# Patient Record
Sex: FEMALE | Race: WHITE | NOT HISPANIC OR LATINO | Employment: PART TIME | URBAN - METROPOLITAN AREA
[De-identification: names, ages, dates, MRNs, and addresses within clinical notes are randomized per-mention and may not be internally consistent; named-entity substitution may affect disease eponyms.]

---

## 2017-03-09 ENCOUNTER — OFFICE VISIT (OUTPATIENT)
Dept: FAMILY MEDICINE | Facility: CLINIC | Age: 57
End: 2017-03-09
Payer: COMMERCIAL

## 2017-03-09 VITALS
HEIGHT: 64 IN | WEIGHT: 179 LBS | DIASTOLIC BLOOD PRESSURE: 76 MMHG | SYSTOLIC BLOOD PRESSURE: 134 MMHG | HEART RATE: 89 BPM | BODY MASS INDEX: 30.56 KG/M2 | TEMPERATURE: 99.2 F

## 2017-03-09 DIAGNOSIS — J02.9 ACUTE PHARYNGITIS, UNSPECIFIED ETIOLOGY: ICD-10-CM

## 2017-03-09 DIAGNOSIS — Z12.11 SPECIAL SCREENING FOR MALIGNANT NEOPLASMS, COLON: Primary | ICD-10-CM

## 2017-03-09 DIAGNOSIS — E11.9 TYPE 2 DIABETES MELLITUS WITHOUT COMPLICATION, WITHOUT LONG-TERM CURRENT USE OF INSULIN (H): ICD-10-CM

## 2017-03-09 DIAGNOSIS — L29.9 PRURITIC DISORDER: ICD-10-CM

## 2017-03-09 LAB
ALBUMIN SERPL-MCNC: 3.1 G/DL (ref 3.4–5)
ALP SERPL-CCNC: 1678 U/L (ref 40–150)
ALT SERPL W P-5'-P-CCNC: 138 U/L (ref 0–50)
ANION GAP SERPL CALCULATED.3IONS-SCNC: 8 MMOL/L (ref 3–14)
AST SERPL W P-5'-P-CCNC: 115 U/L (ref 0–45)
BASOPHILS # BLD AUTO: 0 10E9/L (ref 0–0.2)
BASOPHILS NFR BLD AUTO: 0.7 %
BILIRUB SERPL-MCNC: 0.8 MG/DL (ref 0.2–1.3)
BUN SERPL-MCNC: 21 MG/DL (ref 7–30)
CALCIUM SERPL-MCNC: 8.9 MG/DL (ref 8.5–10.1)
CHLORIDE SERPL-SCNC: 100 MMOL/L (ref 94–109)
CHOLEST SERPL-MCNC: 204 MG/DL
CO2 SERPL-SCNC: 28 MMOL/L (ref 20–32)
CREAT SERPL-MCNC: 0.8 MG/DL (ref 0.52–1.04)
CREAT UR-MCNC: 247 MG/DL
DIFFERENTIAL METHOD BLD: NORMAL
EOSINOPHIL # BLD AUTO: 0.2 10E9/L (ref 0–0.7)
EOSINOPHIL NFR BLD AUTO: 2.5 %
ERYTHROCYTE [DISTWIDTH] IN BLOOD BY AUTOMATED COUNT: 13.2 % (ref 10–15)
GFR SERPL CREATININE-BSD FRML MDRD: 73 ML/MIN/1.7M2
GLUCOSE SERPL-MCNC: 161 MG/DL (ref 70–99)
HBA1C MFR BLD: 8.2 % (ref 4.3–6)
HCT VFR BLD AUTO: 37.8 % (ref 35–47)
HDLC SERPL-MCNC: 72 MG/DL
HGB BLD-MCNC: 12.1 G/DL (ref 11.7–15.7)
LDLC SERPL CALC-MCNC: 121 MG/DL
LYMPHOCYTES # BLD AUTO: 1.1 10E9/L (ref 0.8–5.3)
LYMPHOCYTES NFR BLD AUTO: 18.2 %
MCH RBC QN AUTO: 29.4 PG (ref 26.5–33)
MCHC RBC AUTO-ENTMCNC: 32 G/DL (ref 31.5–36.5)
MCV RBC AUTO: 92 FL (ref 78–100)
MICROALBUMIN UR-MCNC: 20 MG/L
MICROALBUMIN/CREAT UR: 8.02 MG/G CR (ref 0–25)
MONOCYTES # BLD AUTO: 0.6 10E9/L (ref 0–1.3)
MONOCYTES NFR BLD AUTO: 9.7 %
NEUTROPHILS # BLD AUTO: 4.2 10E9/L (ref 1.6–8.3)
NEUTROPHILS NFR BLD AUTO: 68.9 %
NONHDLC SERPL-MCNC: 132 MG/DL
PLATELET # BLD AUTO: 271 10E9/L (ref 150–450)
POTASSIUM SERPL-SCNC: 3.8 MMOL/L (ref 3.4–5.3)
PROT SERPL-MCNC: 8.6 G/DL (ref 6.8–8.8)
RBC # BLD AUTO: 4.11 10E12/L (ref 3.8–5.2)
SODIUM SERPL-SCNC: 136 MMOL/L (ref 133–144)
T4 FREE SERPL-MCNC: 1.07 NG/DL (ref 0.76–1.46)
TRIGL SERPL-MCNC: 56 MG/DL
TSH SERPL DL<=0.05 MIU/L-ACNC: 1.85 MU/L (ref 0.4–4)
WBC # BLD AUTO: 6.1 10E9/L (ref 4–11)

## 2017-03-09 PROCEDURE — 85025 COMPLETE CBC W/AUTO DIFF WBC: CPT | Performed by: FAMILY MEDICINE

## 2017-03-09 PROCEDURE — 36415 COLL VENOUS BLD VENIPUNCTURE: CPT | Performed by: FAMILY MEDICINE

## 2017-03-09 PROCEDURE — 83036 HEMOGLOBIN GLYCOSYLATED A1C: CPT | Performed by: FAMILY MEDICINE

## 2017-03-09 PROCEDURE — 99214 OFFICE O/P EST MOD 30 MIN: CPT | Performed by: FAMILY MEDICINE

## 2017-03-09 PROCEDURE — 84439 ASSAY OF FREE THYROXINE: CPT | Performed by: FAMILY MEDICINE

## 2017-03-09 PROCEDURE — 80053 COMPREHEN METABOLIC PANEL: CPT | Performed by: FAMILY MEDICINE

## 2017-03-09 PROCEDURE — 82043 UR ALBUMIN QUANTITATIVE: CPT | Performed by: FAMILY MEDICINE

## 2017-03-09 PROCEDURE — 80061 LIPID PANEL: CPT | Performed by: FAMILY MEDICINE

## 2017-03-09 PROCEDURE — 84443 ASSAY THYROID STIM HORMONE: CPT | Performed by: FAMILY MEDICINE

## 2017-03-09 RX ORDER — AZITHROMYCIN 250 MG/1
250 TABLET, FILM COATED ORAL DAILY
Qty: 6 TABLET | Refills: 1 | Status: SHIPPED | OUTPATIENT
Start: 2017-03-09 | End: 2017-03-30

## 2017-03-09 RX ORDER — LANCETS
1 EACH MISCELLANEOUS DAILY
Qty: 102 BOX | Refills: 12 | Status: SHIPPED | OUTPATIENT
Start: 2017-03-09 | End: 2018-10-16

## 2017-03-09 NOTE — PROGRESS NOTES
a  SUBJECTIVE:                                                    Saniya Couch is 57 year old female   Chief Complaint   Patient presents with     Cough     dry cough for 3 days     Derm Problem     pruritis throughout her entire body - couple weeks     Concern - dry cough     Onset: couple days     Description:   Dry, hacking cough the past 3 days. Denies any other URI symptoms.    Intensity: mild    Progression of Symptoms:  same    Accompanying Signs & Symptoms:  Headache due to coughing       Previous history of similar problem:   n/a    Precipitating factors:   Worsened by: n/a    Alleviating factors:  Improved by: Mucinex DM       Therapies Tried and outcome: Mucinex DM    Concern - Pruritis     Onset: couple weeks    Description:   Entire body itching started about 2-3 weeks ago. Denies any visual rash or bumps.    Intensity: moderate    Progression of Symptoms:  same    Accompanying Signs & Symptoms:  N/a       Previous history of similar problem:   N/a    Precipitating factors:   Worsened by: N/a    Alleviating factors:  Improved by: OTC anti-itch relief       Therapies Tried and outcome:OTC topical anit-itch relief.      Problem list and histories reviewed & adjusted, as indicated.  Additional history: as documented    Patient Active Problem List   Diagnosis     GERD (gastroesophageal reflux disease)     Hyperlipidemia LDL goal <130     Family history of diabetes mellitus     Essential hypertension, benign     Non morbid obesity due to excess calories     Type 2 diabetes mellitus without complication (H)     Past Surgical History   Procedure Laterality Date     Gallbladder surgery        section          Social History   Substance Use Topics     Smoking status: Never Smoker     Smokeless tobacco: Never Used     Alcohol use No     Family History   Problem Relation Age of Onset     Hypertension Mother      Dementia Mother      HEART DISEASE Father      heart disease     Thyroid Disease  Daughter      thyroid disease     DIABETES Brother      DIABETES Brother      DIABETES Brother      DIABETES Brother          Current Outpatient Prescriptions   Medication Sig Dispense Refill     Emollient (CERAVE) CREA Externally apply topically 2 times daily 1 Bottle 1     azithromycin (ZITHROMAX Z-MELITA) 250 MG tablet Take 1 tablet (250 mg) by mouth daily Two tablets first day, then one tablet daily for four days 6 tablet 1     metFORMIN (GLUCOPHAGE) 500 MG tablet Take 1 tablet (500 mg) by mouth daily (with dinner) 90 tablet 3     blood glucose monitoring (ACCU-CHEK FASTCLIX) lancets 1 each daily Use to test blood sugar 1 times daily or as directed. 102 Box 12     blood glucose monitoring (ACCU-CHEK SMARTVIEW) test strip Use to test blood sugar 2 times daily or as directed. 100 each 12     lisinopril (PRINIVIL,ZESTRIL) 5 MG tablet Take 1 tablet (5 mg) by mouth daily 90 tablet 3     atorvastatin (LIPITOR) 40 MG tablet Take 1 tablet (40 mg) by mouth daily 90 tablet 3     Multiple Vitamins-Minerals (WOMENS 50+ MULTI VITAMIN/MIN) TABS Take  by mouth.       [DISCONTINUED] metFORMIN (GLUCOPHAGE) 500 MG tablet Take 1 tablet (500 mg) by mouth daily (with dinner) 30 tablet 11     No Known Allergies  Recent Labs   Lab Test  07/07/16   1554  07/07/16   0839  02/18/16   0754  01/16/15   0835   11/09/10   0920   A1C   --   6.5*  7.4*  6.3*   < >   --    LDL   --   93  95  96   < >  123   HDL   --   50  42*  46*   < >  46*   TRIG   --   111  282*  166*   < >  166*   ALT   --    --    --    --    --   21   CR  0.67   --   0.71   --    < >   --    GFRESTIMATED  >90  Non  GFR Calc     --   85   --    < >   --    GFRESTBLACK  >90   GFR Calc     --   >90   GFR Calc     --    < >   --    POTASSIUM  4.0   --   4.3   --    < >   --    TSH   --   3.12   --    --    --    --     < > = values in this interval not displayed.      BP Readings from Last 3 Encounters:   03/09/17 134/76   10/10/16  "139/79   07/07/16 138/82    Wt Readings from Last 3 Encounters:   03/09/17 179 lb (81.2 kg)   10/10/16 190 lb (86.2 kg)   07/12/16 186 lb (84.4 kg)         ROS:  Constitutional, HEENT, cardiovascular, pulmonary, gi and gu systems are negative, except as otherwise noted.    OBJECTIVE:                                                    /76  Pulse 89  Temp 99.2  F (37.3  C) (Tympanic)  Ht 5' 4\" (1.626 m)  Wt 179 lb (81.2 kg)  BMI 30.73 kg/m2  GENERAL APPEARANCE ADULT: Alert, no acute distress, appears ill  HENT:  cobblestoning and thick mucous posterior pharynx., right TM abnormal, dull, left TM abnormal, dull, throat/mouth:moderate erythema, mucous membranes moist  RESP: lungs clear to auscultation   CV: normal rate, regular rhythm, no murmur or gallop  Diagnostic Test Results:  pending     ASSESSMENT/PLAN:                                                    1. Special screening for malignant neoplasms, colon  - Fecal colorectal cancer screen (FIT); Future    2. Pruritic disorder  Xeroderma, allergic, getting better  - Comprehensive metabolic panel  - TSH  - T4 FREE  - CBC with platelets differential  - Emollient (CERAVE) CREA; Externally apply topically 2 times daily  Dispense: 1 Bottle; Refill: 1    3. Acute pharyngitis, unspecified etiology  Viral vs bacterial.  Trial of antibiotics but if not effective viral infection and self limiting.  If effective and recurs will repeat, see patient instructions.  - azithromycin (ZITHROMAX Z-MELITA) 250 MG tablet; Take 1 tablet (250 mg) by mouth daily Two tablets first day, then one tablet daily for four days  Dispense: 6 tablet; Refill: 1    4. Type 2 diabetes mellitus without complication, without long-term current use of insulin (H)  due for labs and refill, taking medication without difficulty  - metFORMIN (GLUCOPHAGE) 500 MG tablet; Take 1 tablet (500 mg) by mouth daily (with dinner)  Dispense: 90 tablet; Refill: 3  - blood glucose monitoring (ACCU-CHEK FASTCLIX) " lancets; 1 each daily Use to test blood sugar 1 times daily or as directed.  Dispense: 102 Box; Refill: 12  - blood glucose monitoring (ACCU-CHEK SMARTVIEW) test strip; Use to test blood sugar 2 times daily or as directed.  Dispense: 100 each; Refill: 12  - Hemoglobin A1c  - Lipid panel reflex to direct LDL  - Albumin Random Urine Quantitative    Marbella Landaverde MD  [facility]

## 2017-03-09 NOTE — MR AVS SNAPSHOT
After Visit Summary   3/9/2017    Saniya Couch    MRN: [id number]           Patient Information     Date Of Birth          1960        Visit Information        Provider Department      3/9/2017 7:20 AM Marbella Landaverde MD [facility]        Today's Diagnoses     Special screening for malignant neoplasms, colon    -  1    Pruritic disorder        Acute pharyngitis, unspecified etiology        Type 2 diabetes mellitus without complication, without long-term current use of insulin (H)          Care Instructions          Thank you for choosing [facility].  You may be receiving a survey in the mail [facility] your [facility] today.  Please take a few minutes to complete and return the survey to let us know how we are doing.      If you have questions or concerns, please contact us via EndPlay or you can contact your care team at [phone].    Our Clinic hours are:  Monday 6:40 am  to 7:00 pm  Tuesday -Friday 6:40 am to 5:00 pm    The [place] outpatient lab hours are:  Monday - Friday 6:10 am to 4:45 pm  Saturdays 7:00 am to 11:00 am  Appointments are required, call [phone]    If you have clinical questions after hours or would like to schedule an appointment,  call the clinic at [phone].          Follow-ups after your visit        Future tests that were ordered for you today     Open Future Orders        Priority Expected Expires Ordered    Fecal colorectal cancer screen (FIT) Routine 3/30/2017 6/1/2017 3/9/2017            Who to contact     If you have questions or need follow up information about today's clinic visit or your schedule please contact [facility] directly at [phone].  Normal or non-critical lab and imaging results will be communicated to you by MyChart, letter or phone within 4 business days after the clinic has received the results. If you do not hear from us within 7 days, please contact the clinic through EndPlay or  "phone. If you have a critical or abnormal lab result, we will notify you by phone as soon as possible.  Submit refill requests through Noblivity or call your pharmacy and they will forward the refill request to us. Please allow 3 business days for your refill to be completed.          Additional Information About Your Visit        coRankhart Information     Noblivity lets you send messages to your doctor, view your test results, renew your prescriptions, schedule appointments and more. To sign up, go to [email or web address] . Click on \"Log in\" on the left side of the screen, which will take you to the Welcome page. Then click on \"Sign up Now\" on the right side of the page.     You will be asked to enter the access code listed below, as well as some personal information. Please follow the directions to create your username and password.     Your access code is: [email or web address]  Expires: 2017  7:50 AM     Your access code will  in 90 days. If you need help or a new code, please call your [place] clinic or [phone].        Care EveryWhere ID     This is your Care EveryWhere ID. This could be used by other organizations to access your [place] medical records  [id number]        Your Vitals Were     Pulse Temperature Height BMI (Body Mass Index)          89 99.2  F (37.3  C) (Tympanic) 5' 4\" (1.626 m) 30.73 kg/m2         Blood Pressure from Last 3 Encounters:   17 134/76   10/10/16 139/79   16 138/82    Weight from Last 3 Encounters:   17 179 lb (81.2 kg)   10/10/16 190 lb (86.2 kg)   16 186 lb (84.4 kg)              We Performed the Following     Albumin Random Urine Quantitative     CBC with platelets differential     Comprehensive metabolic panel     Hemoglobin A1c     Lipid panel reflex to direct LDL     T4 FREE     TSH          Today's Medication Changes          These changes are accurate as of: 3/9/17  7:50 AM.  If you have any questions, ask your nurse or doctor.             "   Start taking these medicines.        Dose/Directions    azithromycin 250 MG tablet   Commonly known as:  ZITHROMAX Z-MELITA   Used for:  Acute pharyngitis, unspecified etiology   Started by:  Marbella Landaverde MD        Dose:  250 mg   Take 1 tablet (250 mg) by mouth daily Two tablets first day, then one tablet daily for four days   Quantity:  6 tablet   Refills:  1       CERAVE Crea   Used for:  Pruritic disorder   Started by:  Marbella Landaverde MD        Externally apply topically 2 times daily   Quantity:  1 Bottle   Refills:  1         Stop taking these medicines if you haven't already. Please contact your care team if you have questions.     simvastatin 40 MG tablet   Commonly known as:  ZOCOR   Stopped by:  Marbella Landaverde MD                Where to get your medicines      These medications [facility] to [facility] [address] [place] [address] AT [address]  [address] [facility] [address]     Phone:  [phone]     azithromycin 250 MG tablet    blood glucose monitoring lancets    blood glucose monitoring test strip    CERAVE Crea    metFORMIN 500 MG tablet                Primary Care Provider Office Phone # Fax #    Marbella Landaverde [phone]       [facility] [address] [facility] [address]        Thank you!     Thank you for choosing [facility]  for your care. Our goal is always to provide you with excellent care. Hearing back from our patients is one way we can continue to improve our services. Please take a few minutes to complete the written survey that you may receive in the mail after your visit with us. Thank you!             Your Updated Medication List - Protect others around you: Learn how to safely use, store and throw away your medicines at www.disposemymeds.org.          This list is accurate as of: 3/9/17  7:50 AM.  Always use your most recent med list.                   Brand  Name Dispense Instructions for use    atorvastatin 40 MG tablet    LIPITOR    90 tablet    Take 1 tablet (40 mg) by mouth daily       azithromycin 250 MG tablet    ZITHROMAX Z-MELITA    6 tablet    Take 1 tablet (250 mg) by mouth daily Two tablets first day, then one tablet daily for four days       blood glucose monitoring lancets     102 Box    1 each daily Use to test blood sugar 1 times daily or as directed.       blood glucose monitoring test strip    ACCU-CHEK SMARTVIEW    100 each    Use to test blood sugar 2 times daily or as directed.       CERAVE Crea     1 Bottle    Externally apply topically 2 times daily       lisinopril 5 MG tablet    PRINIVIL/ZESTRIL    90 tablet    Take 1 tablet (5 mg) by mouth daily       metFORMIN 500 MG tablet    GLUCOPHAGE    90 tablet    Take 1 tablet (500 mg) by mouth daily (with dinner)       WOMENS 50+ MULTI VITAMIN/MIN Tabs      Take  by mouth.

## 2017-03-09 NOTE — LETTER
[facility]  [address] [facility] [place] [address]  Phone: [phone]    March 13, 2017    Saniya Couch  [address]  [place] [address]          Dear Ms. Couch,    The results of your recent lab tests were:  Liver function tests are high, rest of tests essentially normal.  A1C bit over goal, Plan to see provider for review and treatment. To schedule that appointment please call [phone].  Component      Latest Ref Rng & Units 3/9/2017   WBC      4.0 - 11.0 10e9/L 6.1   RBC Count      3.8 - 5.2 10e12/L 4.11   Hemoglobin      11.7 - 15.7 g/dL 12.1   Hematocrit      35.0 - 47.0 % 37.8   MCV      78 - 100 fl 92   MCH      26.5 - 33.0 pg 29.4   MCHC      31.5 - 36.5 g/dL 32.0   RDW      10.0 - 15.0 % 13.2   Platelet Count      150 - 450 10e9/L 271   Diff Method       Automated Method   % Neutrophils      % 68.9   % Lymphocytes      % 18.2   % Monocytes      % 9.7   % Eosinophils      % 2.5   % Basophils      % 0.7   Absolute Neutrophil      1.6 - 8.3 10e9/L 4.2   Absolute Lymphocytes      0.8 - 5.3 10e9/L 1.1   Absolute Monocytes      0.0 - 1.3 10e9/L 0.6   Absolute Eosinophils      0.0 - 0.7 10e9/L 0.2   Absolute Basophils      0.0 - 0.2 10e9/L 0.0   Sodium      133 - 144 mmol/L 136   Potassium      3.4 - 5.3 mmol/L 3.8   Chloride      94 - 109 mmol/L 100   Carbon Dioxide      20 - 32 mmol/L 28   Anion Gap      3 - 14 mmol/L 8   Glucose      70 - 99 mg/dL 161 (H)   Urea Nitrogen      7 - 30 mg/dL 21   Creatinine      0.52 - 1.04 mg/dL 0.80   GFR Estimate      >60 mL/min/1.7m2 73   GFR Estimate If Black      >60 mL/min/1.7m2 89   Calcium      8.5 - 10.1 mg/dL 8.9   Bilirubin Total      0.2 - 1.3 mg/dL 0.8   Albumin      3.4 - 5.0 g/dL 3.1 (L)   Protein Total      6.8 - 8.8 g/dL 8.6   Alkaline Phosphatase      40 - 150 U/L 1678 (H)   ALT      0 - 50 U/L 138 (H)   AST      0 - 45 U/L 115 (H)   Cholesterol      <200 mg/dL 204 (H)   Triglycerides      <150 mg/dL 56   HDL  Cholesterol      >49 mg/dL 72   LDL Cholesterol Calculated      <100 mg/dL 121 (H)   Non HDL Cholesterol      <130 mg/dL 132 (H)   Creatinine Urine      mg/dL 247   Albumin Urine mg/L      mg/L 20   Albumin Urine mg/g Cr      0 - 25 mg/g Cr 8.02   TSH      0.40 - 4.00 mU/L 1.85   T4 Free      0.76 - 1.46 ng/dL 1.07   Hemoglobin A1C      4.3 - 6.0 % 8.2 (H)   If you have any further questions or problems, please contact our office.    Sincerely,      Marbella Landaverde MD / mona

## 2017-03-09 NOTE — NURSING NOTE
"Chief Complaint   Patient presents with     Cough     dry cough for 3 days     Derm Problem     pruritis throughout her entire body - couple weeks       Initial /76  Pulse 89  Temp 99.2  F (37.3  C) (Tympanic)  Ht 5' 4\" (1.626 m)  Wt 179 lb (81.2 kg)  BMI 30.73 kg/m2 Estimated body mass index is 30.73 kg/(m^2) as calculated from the following:    Height as of this encounter: 5' 4\" (1.626 m).    Weight as of this encounter: 179 lb (81.2 kg).  Medication Reconciliation: complete  "

## 2017-03-09 NOTE — PATIENT INSTRUCTIONS
Thank you for choosing [facility].  You may be receiving a survey in the mail from [employer] regarding your visit today.  Please take a few minutes to complete and return the survey to let us know how we are doing.      If you have questions or concerns, please contact us via Soko or you can contact your care team at [phone].    Our Clinic hours are:  Monday 6:40 am  to 7:00 pm  Tuesday -Friday 6:40 am to 5:00 pm    The [place] outpatient lab hours are:  Monday - Friday 6:10 am to 4:45 pm  Saturdays 7:00 am to 11:00 am  Appointments are required, call [phone]    If you have clinical questions after hours or would like to schedule an appointment,  call the clinic at [phone].

## 2017-03-10 NOTE — PROGRESS NOTES
Liver function tests are high, rest of tests essentially normal.  A1C bit over goal, Plan see provider for review and treatment.  Please notify.        Thank you. PHILIPPE TRAN MD

## 2017-03-19 PROCEDURE — 82274 ASSAY TEST FOR BLOOD FECAL: CPT | Performed by: FAMILY MEDICINE

## 2017-03-21 DIAGNOSIS — Z12.11 SPECIAL SCREENING FOR MALIGNANT NEOPLASMS, COLON: ICD-10-CM

## 2017-03-21 LAB — HEMOCCULT STL QL IA: NEGATIVE

## 2017-03-21 NOTE — LETTER
[facility]  [address] [facility] [address]  Phone: [phone]  Fax: [phone]    March 22, 2017    Saniya Couch  [address]  [facility] [address]          Dear Ms. Couch,    The results of your recent lab tests were FIT test neg for blood. Repeat yearly or get colonoscopy every 10 years to screen for colon cancer.  Component      Latest Ref Rng & Units 3/19/2017   Occult Blood Scn FIT      NEG Negative    . If you have any further questions or problems, please contact our office.    Sincerely,      Marbella Landaverde MD / cb

## 2017-03-22 NOTE — PROGRESS NOTES
FIT test neg for blood. Repeat yearly or get colonoscopy every 10 years to screen for colon cancer. If further questions or problems notify me. PHILIPPE TRAN MD

## 2017-03-30 ENCOUNTER — APPOINTMENT (OUTPATIENT)
Dept: GENERAL RADIOLOGY | Facility: CLINIC | Age: 57
End: 2017-03-30
Attending: EMERGENCY MEDICINE
Payer: COMMERCIAL

## 2017-03-30 ENCOUNTER — HOSPITAL ENCOUNTER (EMERGENCY)
Facility: CLINIC | Age: 57
Discharge: HOME | End: 2017-03-30
Attending: EMERGENCY MEDICINE | Admitting: EMERGENCY MEDICINE
Payer: COMMERCIAL

## 2017-03-30 VITALS
RESPIRATION RATE: 18 BRPM | OXYGEN SATURATION: 99 % | HEART RATE: 89 BPM | DIASTOLIC BLOOD PRESSURE: 79 MMHG | TEMPERATURE: 98.1 F | SYSTOLIC BLOOD PRESSURE: 135 MMHG

## 2017-03-30 DIAGNOSIS — S86.911A STRAIN OF RIGHT KNEE, INITIAL ENCOUNTER: ICD-10-CM

## 2017-03-30 PROCEDURE — 99213 OFFICE O/P EST LOW 20 MIN: CPT

## 2017-03-30 PROCEDURE — 73562 X-RAY EXAM OF KNEE 3: CPT | Mod: RT

## 2017-03-30 PROCEDURE — 99213 OFFICE O/P EST LOW 20 MIN: CPT | Performed by: EMERGENCY MEDICINE

## 2017-03-30 NOTE — ED AVS SNAPSHOT
[facility]    [address] [facility] [address]    Phone:  [phone]    Fax:  [phone]                                       Saniya Couch   MRN: [id number]    Department:  [facility]   Date of Visit:  3/30/2017           After Visit Summary Signature Page     I have received my discharge instructions, and my questions have been answered. I have discussed any challenges I see with this plan with the nurse or doctor.    ..........................................................................................................................................  Patient/Patient Representative Signature      ..........................................................................................................................................  Patient Representative Print Name and Relationship to Patient    ..................................................               ................................................  Date                                            Time    ..........................................................................................................................................  Reviewed by Signature/Title    ...................................................              ..............................................  Date                                                            Time

## 2017-03-30 NOTE — ED AVS SNAPSHOT
[facility] Emergency Department    [address] [facility] [address]    Phone:  [phone]    Fax:  [phone]                                       Saniya Couch   MRN: [id number]    Department:  [facility] Emergency Department   Date of Visit:  3/30/2017           Patient Information     Date Of Birth          1960        Your diagnoses for this visit were:     Strain of right knee, initial encounter        You were seen by Tato Valentin MD.      Follow-up Information     Follow up with Marbella Landaverde MD.    Specialty:  Family Practice    Why:  As needed    Contact information:    [facility]  [address] [place] [address]  [phone]          Follow up with [facility].    Specialty:  EMERGENCY MEDICINE    Why:  If symptoms worsen    Contact information:    [address] [place] [id number] [phone]    Additional information:    The medical center is located at   [address] [place] [address] (between [address] and   [facility] [address] [place] [address], four miles north   of [place]).        Follow up with [facility].    Specialty:  Orthopedics and Sports Medicine    Why:  As needed    Contact information:    [address] [place] [address]  [phone]    Additional information:    The medical center is located at   [address] [place] [address] (between [address] and   [address] [facility], four miles north   of [place]).        Discharge Instructions        return if symptoms worsen or new symptoms develop.  Follow-up with primary care physician next available.  Take ibuprofen or Tylenol for pain.  Use knee immobilizer as needed for stabilization of the knee.  If symptoms do not improve over the next week follow up with orthopedics.  Ice your knee and elevate this evening.  Muscle Strain in the Extremities  A muscle strain is a stretching and tearing of muscle fibers. This causes  pain, especially when you move that muscle. There may also be some swelling and bruising.  Home care    Keep the hurt area raised to reduce pain and swelling. This is especially important during the first 48 hours.    Apply an ice pack over the injured area for 15 to 20 minutes every 3 to 6 hours. You should do this for the first 24 to 48 hours. You can make an ice pack by filling a plastic bag that seals at the top with ice cubes and then wrapping it with a thin towel. Be careful not to injure your skin with the ice treatments. Ice should never be applied directly to skin. Continue the use of ice packs for relief of pain and swelling as needed. After 48 hours, apply heat (warm shower or warm bath) for 15 to 20 minutes several times a day, or alternate ice and heat.    You may use over-the-counter pain medicine to control pain, unless another medicine was prescribed. If you have chronic liver or kidney disease or ever had a stomach ulcer or GI bleeding, talk with your healthcare provider before using these medicines.    For leg strains: If crutches have been recommended, don t put full weight on the hurt leg until you can do so without pain. You can return to sports when you are able to hop and run on the injured leg without pain.  Follow-up care  Follow up with your healthcare provider, or as advised.  When to seek medical advice  Call your healthcare provider right away if any of these occur:    The toes of the injured leg become swollen, cold, blue, numb, or tingly    Pain or swelling increases    9752-1407 [employer]. [address] [place] [address], [place] [address]. All rights reserved. This information is not intended as a substitute for professional medical care. Always follow your healthcare professional's instructions.          Treating Strains and Sprains  Strains and sprains happen when muscles or other soft tissues near your bones stretch or tear. These injuries can cause bruising, swelling, and  pain. To ease your discomfort and speed the healing of your strain or sprain, follow the tips below. Remember, a strain or sprain can take 6 to 8 weeks to heal.     Important Note: Do not give aspirin to children or teens without discussing it with your healthcare provider first.        Ice first, heat later    Use ice for the first 24 to 48 hours after injury. Ice helps prevent swelling and reduce pain. Ice the injury for no more than 20 minutes at a time and allow at least  20 minutes between icing sessions.    Apply heat after the first 72 hours, once the swelling has gone down. Heat relaxes muscles and increases blood flow. Soak the injured area in warm water or use a heating pad set on low for no more than 15 minutes at a time.  Wrap and elevate    Wrap an injured limb firmly with an elastic bandage. This provides support and helps prevent swelling. Don t wear an elastic bandage overnight. Watch for tingling, numbness, or increased pain, and remove the bandage immediately if any of these occurs.    Elevate the injured area to help reduce swelling and throbbing. It s best to raise an injured limb above the level of your heart.     Medicines    Over-the-counter medicines such as acetaminophen or ibuprofen can help reduce pain. Some also help reduce swelling.    Take medicine only as directed.    Rest the area even if medicines are controlling the pain.  Rest    Rest the injured area by not using it for 24 hours.    When you re ready, return slowly to your normal activities. Rest the injured area often.    Don t use or walk on an injured limb if it hurts.    4735-9703 [employer]. [address] [place] [address], [place] [address]. All rights reserved. This information is not intended as a substitute for professional medical care. Always follow your healthcare professional's instructions.          24 Hour Appointment Hotline       To make an appointment at any [facility], call [phone]  ([phone]). If you don't have a family doctor or clinic, we will help you find one. [facility] are conveniently located to serve the needs of you and your family.          ED Discharge Orders     Knee Immobilizer                    Review of your medicines      Our records show that you are taking the medicines listed below. If these are incorrect, please call your family doctor or clinic.        Dose / Directions Last dose taken    atorvastatin 40 MG tablet   Commonly known as:  LIPITOR   Dose:  40 mg   Quantity:  90 tablet        Take 1 tablet (40 mg) by mouth daily   Refills:  3        blood glucose monitoring lancets   Dose:  1 each   Quantity:  102 Box        1 each daily Use to test blood sugar 1 times daily or as directed.   Refills:  12        blood glucose monitoring test strip   Commonly known as:  ACCU-CHEK SMARTVIEW   Quantity:  100 each        Use to test blood sugar 2 times daily or as directed.   Refills:  12        CERAVE Crea   Quantity:  1 Bottle        Externally apply topically 2 times daily   Refills:  1        lisinopril 5 MG tablet   Commonly known as:  PRINIVIL/ZESTRIL   Dose:  5 mg   Quantity:  90 tablet        Take 1 tablet (5 mg) by mouth daily   Refills:  3        metFORMIN 500 MG tablet   Commonly known as:  GLUCOPHAGE   Dose:  500 mg   Quantity:  90 tablet        Take 1 tablet (500 mg) by mouth daily (with dinner)   Refills:  3        WOMENS 50+ MULTI VITAMIN/MIN Tabs        Take  by mouth.   Refills:  0                Procedures and tests performed during your visit     Knee XR, 3 views, right      Orders Needing Specimen Collection     None      Pending Results     Date and Time Order Name Status Description    3/30/2017 1712 Knee XR, 3 views, right Preliminary             Pending Culture Results     No orders found from 3/28/2017 to 3/31/2017.             Test Results from your hospital stay     3/30/2017  5:30 PM - Interface, Radiant Ib      Narrative     RIGHT KNEE  "THREE VIEWS   3/30/2017 5:26 PM     HISTORY: Pain.    COMPARISON: None.    FINDINGS: Negative right knee. No fracture. No significant  degenerative change.        Impression     IMPRESSION: Negative.                Thank you for choosing [facility]       Thank you for choosing [facility] for your care. Our goal is always to provide you with excellent care. Hearing back from our patients is one way we can continue to improve our services. Please take a few minutes to complete the written survey that you may receive in the mail after you visit with us. Thank you!        [employer] Information     [employer] lets you send messages to your doctor, view your test results, renew your prescriptions, schedule appointments and more. To sign up, go to [email or web address] . Click on \"Log in\" on the left side of the screen, which will take you to the Welcome page. Then click on \"Sign up Now\" on the right side of the page.     You will be asked to enter the access code listed below, as well as some personal information. Please follow the directions to create your username and password.     Your access code is: [email or web address]  Expires: 2017  8:50 AM     Your access code will  in 90 days. If you need help or a new code, please call your [facility] clinic or [phone].        Care EveryWhere ID     This is your Care EveryWhere ID. This could be used by other organizations to access your [facility] medical records  [id number]        After Visit Summary       This is your record. Keep this with you and show to your community pharmacist(s) and doctor(s) at your next visit.                  "

## 2017-03-30 NOTE — ED PROVIDER NOTES
History     Chief Complaint   Patient presents with     Knee Pain     twisted knee wrong     HPI  Saniya Couch is a 57 year old female who presents to the emergency department complaining of right knee pain status post trauma.  Patient states she injured her right knee several weeks ago by twisting it.  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  Patient states she has had some swelling and worsening pain today.  She states the pain is mostly in the lateral aspects of the knee both medial and laterally she states it feels like something is been strained.  She did not fall she did not hit her head.  She denies any neck pain back pain chest pain abdominal pain or focal numbness weakness in the extremity.  She currently rates her pain a 3 out of 10 worse and with any movement.  Past Medical History:   Diagnosis Date     Acid reflux      ASCUS favor benign 9/2007    negative HPV     Helicobacter pylori      Hyperglycemia      Hyperlipidemia      Obesity, unspecified      Ulcer (H) 2005    h-pylori       No current facility-administered medications on file prior to encounter.   Current Outpatient Prescriptions on File Prior to Encounter:  Emollient (CERAVE) CREA Externally apply topically 2 times daily   metFORMIN (GLUCOPHAGE) 500 MG tablet Take 1 tablet (500 mg) by mouth daily (with dinner)   blood glucose monitoring (ACCU-CHEK FASTCLIX) lancets 1 each daily Use to test blood sugar 1 times daily or as directed.   blood glucose monitoring (ACCU-CHEK SMARTVIEW) test strip Use to test blood sugar 2 times daily or as directed.   lisinopril (PRINIVIL,ZESTRIL) 5 MG tablet Take 1 tablet (5 mg) by mouth daily   atorvastatin (LIPITOR) 40 MG tablet Take 1 tablet (40 mg) by mouth daily   Multiple Vitamins-Minerals (WOMENS 50+ MULTI VITAMIN/MIN) TABS Take  by mouth.     Social History     Social History     Marital status:       Spouse name: N/A     Number of children: N/A     Years of education: N/A     Occupational History     Not on file.     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    Review of Systems  As per HPI  Physical Exam   BP: 135/79  Pulse: 89  Temp: 98.1  F (36.7  C)  Resp: 18  SpO2: 99 %  Physical Exam   Constitutional: She appears well-developed and well-nourished. No distress.   HENT:   Head: Normocephalic.   Pulmonary/Chest: Effort normal.   Musculoskeletal:   Right knee with mild swelling in the superior and inferior aspects of the knee.  Tender to palpation along the joint line.  No instability.  Patient has pain with lateral and medial pressure no drawer sign is noted.  No calf tenderness.  Pulses sensation symmetrical she is able to flex and extend the knee.   Neurological: She is alert. She exhibits normal muscle tone.   Skin: Skin is warm and dry. No rash noted.   Psychiatric: She has a normal mood and affect.   Nursing note and vitals reviewed.      ED Course     ED Course     Procedures             Critical Care time:  none               Results for orders placed or performed during the hospital encounter of 03/30/17   Knee XR, 3 views, right    Narrative    RIGHT KNEE THREE VIEWS   3/30/2017 5:26 PM      HISTORY: Pain.    COMPARISON: None.    FINDINGS: Negative right knee. No fracture. No significant  degenerative change.      Impression    IMPRESSION: Negative.    ANDRESSA DEWEY MD         Assessments & Plan (with Medical Decision Making) x-ray was obtained and was unremarkable.  Findings were discussed with patient.  Plan  toPlace her in a knee immobilizer and follow-up orthopedics.  She should elevate his knee this evening and take ibuprofen as needed.  She does not have any calf pain and I do not think she has a DVT.  She'll be given a work excuse for tomorrow.  She should weight-bear as tolerated and if pain worsens stay off the knee as much as possible.  If increased pain swelling or other symptoms she should return for further evaluation and care.       I have reviewed the nursing notes.    I have reviewed the findings, diagnosis, plan and need for follow up with the patient.    Discharge Medication List as of 3/30/2017  5:43 PM          Final diagnoses:   Strain of right knee, initial encounter       3/30/2017   [facility] EMERGENCY DEPARTMENT     Tato Valentin MD  03/30/17 1934

## 2017-03-30 NOTE — LETTER
[facility] EMERGENCY DEPARTMENT  [address] [facility] [address]  [phone]    2017    Saniya Couch  [address]  [facility] [place] [address]  [phone] (home)     : 1960      To Whom it may concern:    Saniya Couch was seen in our Emergency Department today, 2017.  She should be able to return to work on 2017.    Sincerely,        Tato Valentin

## 2017-03-30 NOTE — DISCHARGE INSTRUCTIONS
return if symptoms worsen or new symptoms develop.  Follow-up with primary care physician next available.  Take ibuprofen or Tylenol for pain.  Use knee immobilizer as needed for stabilization of the knee.  If symptoms do not improve over the next week follow up with orthopedics.  Ice your knee and elevate this evening.  Muscle Strain in the Extremities  A muscle strain is a stretching and tearing of muscle fibers. This causes pain, especially when you move that muscle. There may also be some swelling and bruising.  Home care    Keep the hurt area raised to reduce pain and swelling. This is especially important during the first 48 hours.    Apply an ice pack over the injured area for 15 to 20 minutes every 3 to 6 hours. You should do this for the first 24 to 48 hours. You can make an ice pack by filling a plastic bag that seals at the top with ice cubes and then wrapping it with a thin towel. Be careful not to injure your skin with the ice treatments. Ice should never be applied directly to skin. Continue the use of ice packs for relief of pain and swelling as needed. After 48 hours, apply heat (warm shower or warm bath) for 15 to 20 minutes several times a day, or alternate ice and heat.    You may use over-the-counter pain medicine to control pain, unless another medicine was prescribed. If you have chronic liver or kidney disease or ever had a stomach ulcer or GI bleeding, talk with your healthcare provider before using these medicines.    For leg strains: If crutches have been recommended, don t put full weight on the hurt leg until you can do so without pain. You can return to sports when you are able to hop and run on the injured leg without pain.  Follow-up care  Follow up with your healthcare provider, or as advised.  When to seek medical advice  Call your healthcare provider right away if any of these occur:    The toes of the injured leg become swollen, cold, blue, numb, or tingly    Pain or swelling  increases    7332-0931 [employer]. [address] [place] [address], [place] [address]. All rights reserved. This information is not intended as a substitute for professional medical care. Always follow your healthcare professional's instructions.          Treating Strains and Sprains  Strains and sprains happen when muscles or other soft tissues near your bones stretch or tear. These injuries can cause bruising, swelling, and pain. To ease your discomfort and speed the healing of your strain or sprain, follow the tips below. Remember, a strain or sprain can take 6 to 8 weeks to heal.     Important Note: Do not give aspirin to children or teens without discussing it with your healthcare provider first.        Ice first, heat later    Use ice for the first 24 to 48 hours after injury. Ice helps prevent swelling and reduce pain. Ice the injury for no more than 20 minutes at a time and allow at least  20 minutes between icing sessions.    Apply heat after the first 72 hours, once the swelling has gone down. Heat relaxes muscles and increases blood flow. Soak the injured area in warm water or use a heating pad set on low for no more than 15 minutes at a time.  Wrap and elevate    Wrap an injured limb firmly with an elastic bandage. This provides support and helps prevent swelling. Don t wear an elastic bandage overnight. Watch for tingling, numbness, or increased pain, and remove the bandage immediately if any of these occurs.    Elevate the injured area to help reduce swelling and throbbing. It s best to raise an injured limb above the level of your heart.     Medicines    Over-the-counter medicines such as acetaminophen or ibuprofen can help reduce pain. Some also help reduce swelling.    Take medicine only as directed.    Rest the area even if medicines are controlling the pain.  Rest    Rest the injured area by not using it for 24 hours.    When you re ready, return slowly to your normal activities. Rest the  injured area often.    Don t use or walk on an injured limb if it hurts.    4565-5536 [employer]. [address] [place] [address], [place] [address]. All rights reserved. This information is not intended as a substitute for professional medical care. Always follow your healthcare professional's instructions.

## 2017-05-09 ENCOUNTER — TELEPHONE (OUTPATIENT)
Dept: FAMILY MEDICINE | Facility: CLINIC | Age: 57
End: 2017-05-09

## 2017-05-09 NOTE — LETTER
[facility]  [address]  [facility] [address]  Phone: [phone]    May 23, 2017    Saniya Couch  [address]  [facility] [place] [address]              Dear Ms. Couch,  We have been unable to reach you by phone.  In order to ensure we are providing the best quality care, we have reviewed your chart and see that you are due for:  An A1c lab test in June.    Please call the clinic at your earliest convenience to schedule an appointment.  [phone]    Thank you for trusting us with your health care.  Sincerely,    Your [facility] Team

## 2017-05-16 ENCOUNTER — TELEPHONE (OUTPATIENT)
Dept: NURSING | Facility: CLINIC | Age: 57
End: 2017-05-16

## 2017-05-16 NOTE — TELEPHONE ENCOUNTER
"Call Type: Triage Call    Presenting Problem: \"I had a message to call the clinic back.\" Note  found in Epic from Dr Landaverde asking staff to remind Saniya to  schedule an A1C in June.\"  Triage Note:  Guideline Title: Information Only Call; No Symptom Triage (Adult)  Recommended Disposition: Provide Information or Advice Only  Original Inclination: Did not know what to do  Override Disposition:  Intended Action: Call PCP/HCP  Physician Contacted: No  Follow-up call to recent contact; no triage required. Information provided from  past call documentation, approved references or experience. ?  YES  Requesting regular office appointment ? NO  Sign(s) or symptom(s) associated with a diagnosed condition or with a new illness  ? NO  Requesting information about provider, services or community resources ? NO  Call back to complete assessment/clarification of information from prior caller to  complete triage ? NO  Requesting information and provider is best resource; no triage required. ? NO  Caller not with patient and is unable to provide clinical information about  patient to facilitate triage. ? NO  Requesting provider information for recently scheduled test, procedure; no triage  required. Needed information not available per approved resources or clinical  experience. ? NO  Requesting information not available per approved reference or clinical  experience; no triage required. ? NO  Requesting information regarding scheduled exam, test or procedure; no triage  required. Information provided from approved resources or clinical experience. ?  NO  General information question; no triage required. Information provided from  approved references or knowledge of organization. ? NO  Health information question; person denies any symptoms, no triage required.  Information provided from approved references or clinical experience. ? NO  Physician Instructions:  Care Advice:  "

## 2017-05-23 NOTE — TELEPHONE ENCOUNTER
Panel Management Review      Patient has the following on her problem list: diabetes      Composite cancer screening  Chart review shows that this patient is due/due soon for the following None  Summary:    Patient is due/failing the following:   A1C    Action needed:   Patient needs non-fasting lab only appointment    Type of outreach:    No response to phone calls, letter mailed with recommendations.    Questions for provider review:    None                                                                                                                                    MITCH Strickland, CMA

## 2017-06-01 DIAGNOSIS — E11.9 TYPE 2 DIABETES MELLITUS WITHOUT COMPLICATION, WITHOUT LONG-TERM CURRENT USE OF INSULIN (H): ICD-10-CM

## 2017-06-01 DIAGNOSIS — Z11.59 NEED FOR HEPATITIS C SCREENING TEST: ICD-10-CM

## 2017-06-01 LAB
HBA1C MFR BLD: 6.7 % (ref 4.3–6)
HCV AB SERPL QL IA: NORMAL

## 2017-06-01 PROCEDURE — 36415 COLL VENOUS BLD VENIPUNCTURE: CPT | Performed by: FAMILY MEDICINE

## 2017-06-01 PROCEDURE — 83036 HEMOGLOBIN GLYCOSYLATED A1C: CPT | Performed by: FAMILY MEDICINE

## 2017-06-01 PROCEDURE — 86803 HEPATITIS C AB TEST: CPT | Performed by: FAMILY MEDICINE

## 2017-06-01 NOTE — LETTER
[facility]  [address] [facility] [place] [address]  Phone: [phone]  Fax: [phone]    June 7, 2017    Saniya Couch  [address]  [place] [address]          Dear Ms. Couch,    The results of your recent lab tests were within normal limits. Enclosed is a copy of these results.  If you have any further questions or problems, please contact our office.    Hep C normal. A1C at goal.  Please notify.         Thank you. MARBELLA LANDAVERDE MD   Component      Latest Ref Rng & Units 6/1/2017   Hemoglobin A1C      4.3 - 6.0 % 6.7 (H)   Hepatitis C Antibody      NR Nonreactive . . .         Sincerely,      Marbella Landaverde MD / BS

## 2017-07-10 ENCOUNTER — TELEPHONE (OUTPATIENT)
Dept: FAMILY MEDICINE | Facility: CLINIC | Age: 57
End: 2017-07-10

## 2017-07-10 DIAGNOSIS — E11.9 TYPE 2 DIABETES MELLITUS WITHOUT COMPLICATION, WITHOUT LONG-TERM CURRENT USE OF INSULIN (H): Primary | ICD-10-CM

## 2017-07-10 NOTE — LETTER
[facility]  [address] [address]  [phone]        November 6, 2017    Saniya Couch  [address]  [facility] [address]              Dear Saniya Couch    This is to remind you that your fasting Lipids, Basic Metabolic Panel, Hemoglobin A1C, Urine Micro Albumin, and Thyroid test lab is due.    You may call our office at [phone] to schedule an appointment.    Please disregard this notice if you have already had your labs drawn or made an appointment.        Sincerely,        Marbella Landaverde MD

## 2017-07-10 NOTE — LETTER
[facility]  [address]  [facility] [address] [phone]        September 20, 2017  Saniya Couch  [address]  [facility] [address]    Dear Saniya,    I care about your health and have reviewed your health plan. I have reviewed your medical conditions, medication list, and lab results and am making recommendations based on this review, to better manage your health.    You are in particular need of attention regarding:  -Diabetes    I am recommending that you:  -schedule a FOLLOWUP OFFICE APPOINTMENT with me.  I will recheck your: A1c, Lipids (fasting cholesterol - nothing to eat except water and/or meds for 8+ hours), BMP (basic metabolic panel), Microablumin and TSH (thyroid test) tests.    Here is a list of Health Maintenance topics that are due now or due soon:  Health Maintenance Due   Topic Date Due     INFLUENZA VACCINE (SYSTEM ASSIGNED)  09/01/2017     FOOT EXAM Q1 YEAR  10/10/2017     EYE EXAM Q1 YEAR  10/19/2017       Please call us at [phone] (or use [employer]) to address the above recommendations.     Thank you for trusting [facility] and we appreciate the opportunity to serve you.  We look forward to supporting your healthcare needs in the future.    Healthy Regards,        Marbella Landaverde MD/bmc

## 2017-07-11 ENCOUNTER — OFFICE VISIT (OUTPATIENT)
Dept: FAMILY MEDICINE | Facility: CLINIC | Age: 57
End: 2017-07-11
Payer: COMMERCIAL

## 2017-07-11 VITALS
SYSTOLIC BLOOD PRESSURE: 148 MMHG | HEART RATE: 73 BPM | TEMPERATURE: 97.7 F | WEIGHT: 173.2 LBS | BODY MASS INDEX: 29.73 KG/M2 | DIASTOLIC BLOOD PRESSURE: 79 MMHG

## 2017-07-11 DIAGNOSIS — R35.0 URINARY FREQUENCY: Primary | ICD-10-CM

## 2017-07-11 LAB
ALBUMIN UR-MCNC: NEGATIVE MG/DL
APPEARANCE UR: CLEAR
BILIRUB UR QL STRIP: NEGATIVE
COLOR UR AUTO: YELLOW
GLUCOSE UR STRIP-MCNC: NEGATIVE MG/DL
HGB UR QL STRIP: NEGATIVE
KETONES UR STRIP-MCNC: NEGATIVE MG/DL
LEUKOCYTE ESTERASE UR QL STRIP: ABNORMAL
NITRATE UR QL: NEGATIVE
PH UR STRIP: 5 PH (ref 5–7)
RBC #/AREA URNS AUTO: ABNORMAL /HPF (ref 0–2)
SP GR UR STRIP: 1.02 (ref 1–1.03)
URATE CRY #/AREA URNS HPF: ABNORMAL /HPF
URN SPEC COLLECT METH UR: ABNORMAL
UROBILINOGEN UR STRIP-ACNC: 0.2 EU/DL (ref 0.2–1)
WBC #/AREA URNS AUTO: ABNORMAL /HPF (ref 0–2)

## 2017-07-11 PROCEDURE — 99213 OFFICE O/P EST LOW 20 MIN: CPT | Performed by: FAMILY MEDICINE

## 2017-07-11 PROCEDURE — 81001 URINALYSIS AUTO W/SCOPE: CPT | Performed by: FAMILY MEDICINE

## 2017-07-11 RX ORDER — SULFAMETHOXAZOLE/TRIMETHOPRIM 800-160 MG
1 TABLET ORAL 2 TIMES DAILY
Qty: 10 TABLET | Refills: 0 | Status: SHIPPED | OUTPATIENT
Start: 2017-07-11 | End: 2017-07-16

## 2017-07-11 NOTE — NURSING NOTE
"Initial /74  Pulse 69  Temp 97.7  F (36.5  C) (Tympanic)  Wt 173 lb 3.2 oz (78.6 kg)  BMI 29.73 kg/m2 Estimated body mass index is 29.73 kg/(m^2) as calculated from the following:    Height as of 3/9/17: 5' 4\" (1.626 m).    Weight as of this encounter: 173 lb 3.2 oz (78.6 kg). .      "

## 2017-07-11 NOTE — MR AVS SNAPSHOT
"              After Visit Summary   7/11/2017    Saniya Couch    MRN: [id number]           Patient Information     Date Of Birth          1960        Visit Information        Provider Department      7/11/2017 7:40 AM Marbella Landaverde MD [facility]        Today's Diagnoses     Urinary frequency    -  1       Follow-ups after your visit        Future tests that were ordered for you today     Open Future Orders        Priority Expected Expires Ordered    Hemoglobin A1c Routine 9/10/2017 10/10/2017 7/10/2017    Basic metabolic panel Routine 9/10/2017 10/10/2017 7/10/2017    Lipid panel reflex to direct LDL Routine 9/10/2017 10/10/2017 7/10/2017    TSH Routine 9/10/2017 10/10/2017 7/10/2017    T4 FREE Routine 9/10/2017 10/10/2017 7/10/2017    Albumin Random Urine Quantitative Routine 9/10/2017 10/10/2017 7/10/2017            Who to contact     If you have questions or need follow up information about today's clinic visit or your schedule please contact [facility] directly at [phone].  Normal or non-critical lab and imaging results will be communicated to you by eSolarhart, letter or phone within 4 business days after the clinic has received the results. If you do not hear from us within 7 days, please contact the clinic through eSolarhart or phone. If you have a critical or abnormal lab result, we will notify you by phone as soon as possible.  Submit refill requests through Virt[employer]ize or call your pharmacy and they will forward the refill request to us. Please allow 3 business days for your refill to be completed.          Additional Information About Your Visit        eSolarhart Information     Virtusize lets you send messages to your doctor, view your test results, renew your prescriptions, schedule appointments and more. To sign up, go to [email or web address] . Click on \"Log in\" on the left side of the screen, which will take you to the Welcome page. Then click on \"Sign up Now\" on " the right side of the page.     You will be asked to enter the access code listed below, as well as some personal information. Please follow the directions to create your username and password.     Your access code is: [email or web address]  Expires: 10/9/2017  8:15 AM     Your access code will  in 90 days. If you need help or a new code, please call your [place] clinic or [phone].        Care EveryWhere ID     This is your Care EveryWhere ID. This could be used by other organizations to access your [place] medical records  [id number]        Your Vitals Were     Pulse Temperature BMI (Body Mass Index)             69 97.7  F (36.5  C) (Tympanic) 29.73 kg/m2          Blood Pressure from Last 3 Encounters:   17 148/74   17 135/79   17 134/76    Weight from Last 3 Encounters:   17 173 lb 3.2 oz (78.6 kg)   17 179 lb (81.2 kg)   10/10/16 190 lb (86.2 kg)              We Performed the Following     UA reflex to Microscopic and Culture     Urine Microscopic          Today's Medication Changes          These changes are accurate as of: 17  8:15 AM.  If you have any questions, ask your nurse or doctor.               Start taking these medicines.        Dose/Directions    sulfamethoxazole-trimethoprim 800-160 MG per tablet   Commonly known as:  BACTRIM DS   Used for:  Urinary frequency   Started by:  Marbella Landaverde MD        Dose:  1 tablet   Take 1 tablet by mouth 2 times daily for 5 days   Quantity:  10 tablet   Refills:  0            Where to get your medicines      These medications were sent to [id number] - [facility] [address] [facility] OF [address] [place] [address]  [address] [facility] [address]     Phone:  [phone]     sulfamethoxazole-trimethoprim 800-160 MG per tablet                Primary Care Provider Office Phone # Fax #    Marbella Landaverde [phone]       [address]  [place] [address]        Equal Access to Services     [facility] BE : Hadii joshua yeboah regina Soqi, waaxda luqadaha, qaybta kaalmada janinajavierrehan, waxdamien nova gantjonathanjose bradley. So [facility] [phone].    ATENCIÓN: Si habla español, tiene a juarez disposición servicios gratuitos de asistencia lingüística. Llame al [phone].    We comply with applicable federal civil rights laws and [place] laws. We do not discriminate on the basis of race, color, national origin, age, disability sex, sexual orientation or gender identity.            Thank you!     Thank you for choosing [facility]  for your care. Our goal is always to provide you with excellent care. Hearing back from our patients is one way we can continue to improve our services. Please take a few minutes to complete the written survey that you may receive in the mail after your visit with us. Thank you!             Your Updated Medication List - Protect others around you: Learn how to safely use, store and throw away your medicines at www.disposemymeds.org.          This list is accurate as of: 7/11/17  8:15 AM.  Always use your most recent med list.                   Brand Name Dispense Instructions for use Diagnosis    atorvastatin 40 MG tablet    LIPITOR    90 tablet    Take 1 tablet (40 mg) by mouth daily    Hyperlipidemia LDL goal <130       blood glucose monitoring lancets     102 Box    1 each daily Use to test blood sugar 1 times daily or as directed.    Type 2 diabetes mellitus without complication, without long-term current use of insulin (H)       blood glucose monitoring test strip    ACCU-CHEK SMARTVIEW    100 each    Use to test blood sugar 2 times daily or as directed.    Type 2 diabetes mellitus without complication, without long-term current use of insulin (H)       CERAVE Crea     1 Bottle    Externally apply topically 2 times daily    Pruritic disorder       lisinopril 5 MG tablet    PRINIVIL/ZESTRIL    90 tablet    Take 1 tablet  (5 mg) by mouth daily    Essential hypertension with goal blood pressure less than 130/80       metFORMIN 500 MG tablet    GLUCOPHAGE    90 tablet    Take 1 tablet (500 mg) by mouth daily (with dinner)    Type 2 diabetes mellitus without complication, without long-term current use of insulin (H)       sulfamethoxazole-trimethoprim 800-160 MG per tablet    BACTRIM DS    10 tablet    Take 1 tablet by mouth 2 times daily for 5 days    Urinary frequency       WOMENS 50+ MULTI VITAMIN/MIN Tabs      Take  by mouth.

## 2017-07-11 NOTE — PROGRESS NOTES
SUBJECTIVE:                                                    Saniya Couch is 57 year old female   Chief Complaint   Patient presents with     UTI     URINARY TRACT SYMPTOMS      Duration: 1 day    Description  frequency, urgency, hesitancy, retention and back pain with sitting    Intensity:  moderate    Accompanying signs and symptoms:  Fever/chills: no   Flank pain YES  Nausea and vomiting: no   Vaginal symptoms: none  Abdominal/Pelvic Pain: no     History  History of frequent UTI's: YES  History of kidney stones: no   Sexually Active: YES  Possibility of pregnancy: No    Precipitating or alleviating factors: None    Therapies tried and outcome: increase fluid intake         Problem list and histories reviewed & adjusted, as indicated.  Additional history: blood sugar bit high today    Patient Active Problem List   Diagnosis     GERD (gastroesophageal reflux disease)     Hyperlipidemia LDL goal <130     Family history of diabetes mellitus     Essential hypertension, benign     Non morbid obesity due to excess calories     Type 2 diabetes mellitus without complication (H)     Past Surgical History:   Procedure Laterality Date      SECTION        GALLBLADDER SURGERY         Social History   Substance Use Topics     Smoking status: Never Smoker     Smokeless tobacco: Never Used     Alcohol use No     Family History   Problem Relation Age of Onset     Hypertension Mother      Dementia Mother      HEART DISEASE Father      heart disease     Thyroid Disease Daughter      thyroid disease     DIABETES Brother      DIABETES Brother      DIABETES Brother      DIABETES Brother          Current Outpatient Prescriptions   Medication Sig Dispense Refill     sulfamethoxazole-trimethoprim (BACTRIM DS) 800-160 MG per tablet Take 1 tablet by mouth 2 times daily for 5 days 10 tablet 0     Emollient (CERAVE) CREA Externally apply topically 2 times daily 1 Bottle 1     metFORMIN (GLUCOPHAGE) 500 MG tablet Take 1  tablet (500 mg) by mouth daily (with dinner) 90 tablet 3     blood glucose monitoring (ACCU-CHEK FASTCLIX) lancets 1 each daily Use to test blood sugar 1 times daily or as directed. 102 Box 12     blood glucose monitoring (ACCU-CHEK SMARTVIEW) test strip Use to test blood sugar 2 times daily or as directed. 100 each 12     lisinopril (PRINIVIL,ZESTRIL) 5 MG tablet Take 1 tablet (5 mg) by mouth daily 90 tablet 3     atorvastatin (LIPITOR) 40 MG tablet Take 1 tablet (40 mg) by mouth daily 90 tablet 3     Multiple Vitamins-Minerals (WOMENS 50+ MULTI VITAMIN/MIN) TABS Take  by mouth.       No Known Allergies  Recent Labs   Lab Test  06/01/17   0809  03/09/17   0758  07/07/16   1554  07/07/16   0839  02/18/16   0754   11/09/10   0920   A1C  6.7*  8.2*   --   6.5*  7.4*   < >   --    LDL   --   121*   --   93  95   < >  123   HDL   --   72   --   50  42*   < >  46*   TRIG   --   56   --   111  282*   < >  166*   ALT   --   138*   --    --    --    --   21   CR   --   0.80  0.67   --   0.71   < >   --    GFRESTIMATED   --   73  >90  Non  GFR Calc     --   85   < >   --    GFRESTBLACK   --   89  >90   GFR Calc     --   >90   GFR Calc     < >   --    POTASSIUM   --   3.8  4.0   --   4.3   < >   --    TSH   --   1.85   --   3.12   --    --    --     < > = values in this interval not displayed.      BP Readings from Last 3 Encounters:   07/11/17 148/79   03/30/17 135/79   03/09/17 134/76    Wt Readings from Last 3 Encounters:   07/11/17 173 lb 3.2 oz (78.6 kg)   03/09/17 179 lb (81.2 kg)   10/10/16 190 lb (86.2 kg)         ROS:  Constitutional, HEENT, cardiovascular, pulmonary, gi and gu systems are negative, except as otherwise noted.    OBJECTIVE:                                                    /79  Pulse 73  Temp 97.7  F (36.5  C) (Tympanic)  Wt 173 lb 3.2 oz (78.6 kg)  BMI 29.73 kg/m2  GENERAL APPEARANCE ADULT: Alert, no acute distress  PSYCH: mentation appears  normal., affect and mood normal  Diagnostic Test Results:  Results for orders placed or performed in visit on 07/11/17   UA reflex to Microscopic and Culture   Result Value Ref Range    Color Urine Yellow     Appearance Urine Clear     Glucose Urine Negative NEG mg/dL    Bilirubin Urine Negative NEG    Ketones Urine Negative NEG mg/dL    Specific Gravity Urine 1.020 1.003 - 1.035    Blood Urine Negative NEG    pH Urine 5.0 5.0 - 7.0 pH    Protein Albumin Urine Negative NEG mg/dL    Urobilinogen Urine 0.2 0.2 - 1.0 EU/dL    Nitrite Urine Negative NEG    Leukocyte Esterase Urine Trace (A) NEG    Source Midstream Urine    Urine Microscopic   Result Value Ref Range    WBC Urine O - 2 0 - 2 /HPF    RBC Urine O - 2 0 - 2 /HPF    Uric Acid Crystals Few (A) NEG /HPF          ASSESSMENT/PLAN:                                                    1. Urinary frequency  Probable UTI, treat and culture, change treatment if proves resiant to bactrim.  - UA reflex to Microscopic and Culture  - Urine Microscopic  - sulfamethoxazole-trimethoprim (BACTRIM DS) 800-160 MG per tablet; Take 1 tablet by mouth 2 times daily for 5 days  Dispense: 10 tablet; Refill: 0    Marbella Landaverde MD  [facility]

## 2017-08-15 ENCOUNTER — TELEPHONE (OUTPATIENT)
Dept: FAMILY MEDICINE | Facility: CLINIC | Age: 57
End: 2017-08-15

## 2017-08-15 NOTE — LETTER
[facility]  [address]  [facility] [address]  Phone: [phone]    08/15/17    Saniya Couch  [address]  [facility] [address]      Saniya      During a recent visit to our clinic, your blood pressure was elevated above the target range for your health.   BP Readings from Last 3 Encounters:   07/11/17 148/79   03/30/17 135/79   03/09/17 134/76       Please schedule a free appointment for a blood pressure check with nurse in the near future.  You can call [phone] to do this.      Why is high blood pressure a big deal?    If blood pressure is elevated above target levels you have an increased risk of experiencing a heart attack or stroke, developing heart failure, kidney disease or other chronic diseases.    With appropriate early recognition and treatment, these health problems can be avoided in most cases.  Your physician can help you determine the need for treatment and discuss the non-medication and medication routes to treating high blood pressure as well as evaluate you for possible causes and effects of high blood pressure.    The target range for ideal blood pressure control is less than 140/90 for most individuals.  For those who have been diagnosed with heart disease, diabetes, stroke or peripheral vascular disease this target range is less than 130/80.      Sincerely,  Marbella Landaverde MD

## 2017-08-15 NOTE — TELEPHONE ENCOUNTER
Panel Management Review      Patient has the following on her problem list:     Hypertension   Last three blood pressure readings:  BP Readings from Last 3 Encounters:   07/11/17 148/79   03/30/17 135/79   03/09/17 134/76     Blood pressure: FAILED    HTN Guidelines:  Age 18-59 BP range:  Less than 140/90  Age 60-85 with Diabetes:  Less than 140/90  Age 60-85 without Diabetes:  less than 150/90        Composite cancer screening  Chart review shows that this patient is due/due soon for the following None  Summary:    Patient is due/failing the following:   BP CHECK    Action needed:   Patient needs nurse only appointment.    Type of outreach:    Sent letter.    Questions for provider review:    None                                                                                                                                    Estrella CLAYTON CMA       Chart routed to Estrella CLAYTON CMA   .

## 2017-09-07 ENCOUNTER — TELEPHONE (OUTPATIENT)
Dept: FAMILY MEDICINE | Facility: CLINIC | Age: 57
End: 2017-09-07

## 2017-09-07 ENCOUNTER — OFFICE VISIT (OUTPATIENT)
Dept: FAMILY MEDICINE | Facility: CLINIC | Age: 57
End: 2017-09-07
Payer: COMMERCIAL

## 2017-09-07 VITALS — DIASTOLIC BLOOD PRESSURE: 80 MMHG | SYSTOLIC BLOOD PRESSURE: 163 MMHG | HEART RATE: 73 BPM

## 2017-09-07 DIAGNOSIS — Z01.30 BLOOD PRESSURE CHECK: Primary | ICD-10-CM

## 2017-09-07 DIAGNOSIS — I10 ESSENTIAL HYPERTENSION WITH GOAL BLOOD PRESSURE LESS THAN 130/80: ICD-10-CM

## 2017-09-07 PROCEDURE — 99207 ZZC NO CHARGE NURSE ONLY: CPT

## 2017-09-07 RX ORDER — LISINOPRIL 5 MG/1
10 TABLET ORAL DAILY
Qty: 90 TABLET | Refills: 3 | Status: SHIPPED | OUTPATIENT
Start: 2017-09-07 | End: 2017-12-14

## 2017-09-07 NOTE — MR AVS SNAPSHOT
"              After Visit Summary   2017    Saniya Couch    MRN: [id number]           Patient Information     Date Of Birth          1960        Visit Information        Provider Department      2017 8:30 AM TREVIN DOYLE/SADE ZUNIGA [facility]        Today's Diagnoses     Blood pressure check    -  1       Follow-ups after your visit        Who to contact     If you have questions or need follow up information about today's clinic visit or your schedule please contact [facility] directly at [phone].  Normal or non-critical lab and imaging results will be communicated to you by MyChart, letter or phone within 4 business days after the clinic has received the results. If you do not hear from us within 7 days, please contact the clinic through Advanced Chip Expresshart or phone. If you have a critical or abnormal lab result, we will notify you by phone as soon as possible.  Submit refill requests through Think Passenger or call your pharmacy and they will forward the refill request to us. Please allow 3 business days for your refill to be completed.          Additional Information About Your Visit        MyChart Information     Think Passenger lets you send messages to your doctor, view your test results, renew your prescriptions, schedule appointments and more. To sign up, go to [email or web address] . Click on \"Log in\" on the left side of the screen, which will take you to the Welcome page. Then click on \"Sign up Now\" on the right side of the page.     You will be asked to enter the access code listed below, as well as some personal information. Please follow the directions to create your username and password.     Your access code is: [email or web address]  Expires: 10/9/2017  8:15 AM     Your access code will  in 90 days. If you need help or a new code, please call your [facility] or [phone].        Care EveryWhere ID     This is your Care EveryWhere ID. This could be used by other organizations to " access your [place] medical records  [id number]        Your Vitals Were     Pulse                   73            Blood Pressure from Last 3 Encounters:   09/07/17 163/80   07/11/17 148/79   03/30/17 135/79    Weight from Last 3 Encounters:   07/11/17 173 lb 3.2 oz (78.6 kg)   03/09/17 179 lb (81.2 kg)   10/10/16 190 lb (86.2 kg)              Today, you had the following     No orders found for display       Primary Care Provider Office Phone # Fax #    Marbella Marely Landaverde [phone] [address] [address]        Equal Access to Services     [facility] : Hadii joshua tapia Soqi, waaxda domingo, qaybta kaalmada adesheryl, prudence bradley. So [facility] [phone].    ATENCIÓN: Si habla español, tiene a juarez disposición servicios gratuitos de asistencia lingüística. Llame al [phone].    We comply with applicable federal civil rights laws and [place] laws. We do not discriminate on the basis of race, color, national origin, age, disability sex, sexual orientation or gender identity.            Thank you!     Thank you for choosing [facility]  for your care. Our goal is always to provide you with excellent care. Hearing back from our patients is one way we can continue to improve our services. Please take a few minutes to complete the written survey that you may receive in the mail after your visit with us. Thank you!             Your Updated Medication List - Protect others around you: Learn how to safely use, store and throw away your medicines at www.disposemymeds.org.          This list is accurate as of: 9/7/17  8:51 AM.  Always use your most recent med list.                   Brand Name Dispense Instructions for use Diagnosis    atorvastatin 40 MG tablet    LIPITOR    90 tablet    Take 1 tablet (40 mg) by mouth daily    Hyperlipidemia LDL goal <130       blood glucose monitoring lancets     102 Box    1 each daily Use to  test blood sugar 1 times daily or as directed.    Type 2 diabetes mellitus without complication, without long-term current use of insulin (H)       blood glucose monitoring test strip    ACCU-CHEK SMARTVIEW    100 each    Use to test blood sugar 2 times daily or as directed.    Type 2 diabetes mellitus without complication, without long-term current use of insulin (H)       CERAVE Crea     1 Bottle    Externally apply topically 2 times daily    Pruritic disorder       lisinopril 5 MG tablet    PRINIVIL/ZESTRIL    90 tablet    Take 1 tablet (5 mg) by mouth daily    Essential hypertension with goal blood pressure less than 130/80       metFORMIN 500 MG tablet    GLUCOPHAGE    90 tablet    Take 1 tablet (500 mg) by mouth daily (with dinner)    Type 2 diabetes mellitus without complication, without long-term current use of insulin (H)       WOMENS 50+ MULTI VITAMIN/MIN Tabs      Take  by mouth.

## 2017-09-07 NOTE — TELEPHONE ENCOUNTER
S-(situation): Patient was here today for a blood pressure check after being called for panel management.    B-(background): Patient had been off of her lisinopril x2 weeks due to insurance reasons and now has restarted and been on the lisinopril for 6 days.  Denies symptoms of high or low blood pressure.  Last blood pressures in clinic  BP Readings from Last 5 Encounters:   09/07/17 163/80   07/11/17 148/79   03/30/17 135/79   03/09/17 134/76   10/10/16 139/79     First blood pressure reading today was 166/79.    A-(assessment): Blood pressure taken x2 today and both readings are elevated.    R-(recommendations): Increase lisinopril? Return to clinic for another RN BP check due to break in medication?  Please advise.         Malorie Juarez RN

## 2017-09-07 NOTE — NURSING NOTE
Patient was here today for a blood pressure check after being called for panel management.  Patient had been off of her lisinopril x2 weeks due to insurance reasons and now has restarted and been on the lisinopril for 6 days.  Denies symptoms of high or low blood pressure.  Blood pressure taken x2 today and both readings are elevated.    Malorie Juarez RN

## 2017-09-20 NOTE — TELEPHONE ENCOUNTER
Sent letter to patient reminding her of her diabetes recheck and labs that are due.  Gave scheduling information for patient to call and schedule labs and office visit.      Panel Management Review      Patient has the following on her problem list:     Diabetes    ASA: Failed    Last A1C  Lab Results   Component Value Date    A1C 6.7 06/01/2017    A1C 8.2 03/09/2017    A1C 6.5 07/07/2016    A1C 7.4 02/18/2016    A1C 6.3 01/16/2015     A1C tested: Passed    Last LDL:    Lab Results   Component Value Date    CHOL 204 03/09/2017     Lab Results   Component Value Date    HDL 72 03/09/2017     Lab Results   Component Value Date     03/09/2017     Lab Results   Component Value Date    TRIG 56 03/09/2017     Lab Results   Component Value Date    CHOLHDLRATIO 3.8 01/16/2015     Lab Results   Component Value Date    NHDL 132 03/09/2017       Is the patient on a Statin? YES             Is the patient on Aspirin? NO    Medications     HMG CoA Reductase Inhibitors    atorvastatin (LIPITOR) 40 MG tablet          Last three blood pressure readings:  BP Readings from Last 3 Encounters:   09/07/17 163/80   07/11/17 148/79   03/30/17 135/79       Date of last diabetes office visit: 10/10/2016     Tobacco History:     History   Smoking Status     Never Smoker   Smokeless Tobacco     Never Used             Composite cancer screening  Chart review shows that this patient is due/due soon for the following None  Summary:    Patient is due/failing the following:   A1C and FOLLOW UP    Action needed:   Patient needs office visit for diabetes and Patient needs fasting lab only appointment.    Type of outreach:    Phone, left message for patient to call back.  and Sent letter.    Questions for provider review:    None                                                                                                                                    Adrienne Sinha CMA (AAMA) 10:44 AM 9/20/2017         Chart routed to [facility] .

## 2017-10-16 ENCOUNTER — TELEPHONE (OUTPATIENT)
Dept: FAMILY MEDICINE | Facility: CLINIC | Age: 57
End: 2017-10-16

## 2017-10-16 NOTE — TELEPHONE ENCOUNTER
Panel Management Review      Summary:    Patient is due/failing the following:   BP CHECK    Action needed:   Patient needs nurse only appointment.    Type of outreach:    Sent letter.    Questions for provider review:    None                                                                                                                                    Estrella CLAYTON CMA       Chart routed to None .

## 2017-10-16 NOTE — LETTER
October 16, 2017      Saniya Couch  [address]  [facility] [place] [address]        Dear Saniya,       During a recent visit to our clinic, your blood pressure was elevated above the target range for your health.   BP Readings from Last 3 Encounters:   09/07/17 163/80   07/11/17 148/79   03/30/17 135/79       Dr. Landaverde had requested that you follow up for a blood pressure check three weeks after changing your medication. Please schedule a free appointment for a blood pressure check with nurse in the near future.  You can call [phone] to do this.      Why is high blood pressure a big deal?    If blood pressure is elevated above target levels you have an increased risk of experiencing a heart attack or stroke, developing heart failure, kidney disease or other chronic diseases.    With appropriate early recognition and treatment, these health problems can be avoided in most cases.  Your physician can help you determine the need for treatment and discuss the non-medication and medication routes to treating high blood pressure as well as evaluate you for possible causes and effects of high blood pressure.    The target range for ideal blood pressure control is less than 140/90 for most individuals.  For those who have been diagnosed with heart disease, diabetes, stroke or peripheral vascular disease this target range is less than 130/80.        Thank you,    Estrella CLAYTON,  SAMMY

## 2017-10-20 ENCOUNTER — TELEPHONE (OUTPATIENT)
Dept: FAMILY MEDICINE | Facility: CLINIC | Age: 57
End: 2017-10-20

## 2017-10-20 ENCOUNTER — OFFICE VISIT (OUTPATIENT)
Dept: FAMILY MEDICINE | Facility: CLINIC | Age: 57
End: 2017-10-20
Payer: COMMERCIAL

## 2017-10-20 VITALS
DIASTOLIC BLOOD PRESSURE: 72 MMHG | BODY MASS INDEX: 29.94 KG/M2 | HEIGHT: 64 IN | WEIGHT: 175.4 LBS | SYSTOLIC BLOOD PRESSURE: 139 MMHG | TEMPERATURE: 97.8 F | HEART RATE: 74 BPM

## 2017-10-20 DIAGNOSIS — E78.5 HYPERLIPIDEMIA LDL GOAL <130: ICD-10-CM

## 2017-10-20 DIAGNOSIS — Z12.11 SPECIAL SCREENING FOR MALIGNANT NEOPLASMS, COLON: ICD-10-CM

## 2017-10-20 DIAGNOSIS — E11.9 TYPE 2 DIABETES MELLITUS WITHOUT COMPLICATION, WITHOUT LONG-TERM CURRENT USE OF INSULIN (H): Primary | ICD-10-CM

## 2017-10-20 DIAGNOSIS — Z23 NEED FOR PROPHYLACTIC VACCINATION AND INOCULATION AGAINST INFLUENZA: ICD-10-CM

## 2017-10-20 DIAGNOSIS — I10 ESSENTIAL HYPERTENSION WITH GOAL BLOOD PRESSURE LESS THAN 130/80: ICD-10-CM

## 2017-10-20 LAB — HBA1C MFR BLD: 6.3 % (ref 4.3–6)

## 2017-10-20 PROCEDURE — 90732 PPSV23 VACC 2 YRS+ SUBQ/IM: CPT | Performed by: FAMILY MEDICINE

## 2017-10-20 PROCEDURE — 90471 IMMUNIZATION ADMIN: CPT | Performed by: FAMILY MEDICINE

## 2017-10-20 PROCEDURE — 36415 COLL VENOUS BLD VENIPUNCTURE: CPT | Performed by: FAMILY MEDICINE

## 2017-10-20 PROCEDURE — 99214 OFFICE O/P EST MOD 30 MIN: CPT | Mod: 25 | Performed by: FAMILY MEDICINE

## 2017-10-20 PROCEDURE — 90686 IIV4 VACC NO PRSV 0.5 ML IM: CPT | Performed by: FAMILY MEDICINE

## 2017-10-20 PROCEDURE — 83036 HEMOGLOBIN GLYCOSYLATED A1C: CPT | Performed by: FAMILY MEDICINE

## 2017-10-20 PROCEDURE — 90472 IMMUNIZATION ADMIN EACH ADD: CPT | Performed by: FAMILY MEDICINE

## 2017-10-20 RX ORDER — LISINOPRIL 10 MG/1
10 TABLET ORAL DAILY
Qty: 90 TABLET | Refills: 3 | Status: SHIPPED | OUTPATIENT
Start: 2017-10-20 | End: 2018-10-16

## 2017-10-20 RX ORDER — ATORVASTATIN CALCIUM 40 MG/1
40 TABLET, FILM COATED ORAL DAILY
Qty: 90 TABLET | Refills: 3 | Status: SHIPPED | OUTPATIENT
Start: 2017-10-20 | End: 2018-10-16

## 2017-10-20 RX ORDER — LISINOPRIL 5 MG/1
10 TABLET ORAL DAILY
Qty: 90 TABLET | Refills: 3 | Status: CANCELLED | OUTPATIENT
Start: 2017-10-20

## 2017-10-20 NOTE — NURSING NOTE
Screening Questionnaire for Adult Immunization    Are you sick today?   No   Do you have allergies to medications, food, a vaccine component or latex?   No   Have you ever had a serious reaction after receiving a vaccination?   No   Do you have a long-term health problem with heart disease, lung disease, asthma, kidney disease, metabolic disease (e.g. diabetes), anemia, or other blood disorder?   No   Do you have cancer, leukemia, HIV/AIDS, or any other immune system problem?   No   In the past 3 months, have you taken medications that affect  your immune system, such as prednisone, other steroids, or anticancer drugs; drugs for the treatment of rheumatoid arthritis, Crohn s disease, or psoriasis; or have you had radiation treatments?   No   Have you had a seizure, or a brain or other nervous system problem?   No   During the past year, have you received a transfusion of blood or blood     products, or been given immune (gamma) globulin or antiviral drug?   No   For women: Are you pregnant or is there a chance you could become        pregnant during the next month?   No   Have you received any vaccinations in the past 4 weeks?   No     Immunization questionnaire answers were all negative.        Per orders of Dr. Landaverde, injection of Pneumovax 23 and flu shot given by Mee Ybarra. Patient instructed to remain in clinic for 15 minutes afterwards, and to report any adverse reaction to me immediately.       Screening performed by Mee Ybarra on 10/20/2017 at 8:15 AM.

## 2017-10-20 NOTE — NURSING NOTE
"Initial /72  Pulse 74  Temp 97.8  F (36.6  C) (Tympanic)  Ht 5' 3.75\" (1.619 m)  Wt 175 lb 6.4 oz (79.6 kg)  BMI 30.34 kg/m2 Estimated body mass index is 30.34 kg/(m^2) as calculated from the following:    Height as of this encounter: 5' 3.75\" (1.619 m).    Weight as of this encounter: 175 lb 6.4 oz (79.6 kg). .      "

## 2017-10-20 NOTE — TELEPHONE ENCOUNTER
HCC score is saying Saniya has colon cancer but she does not.  Please remove this from EPIC.  I do not see it on her problem list, suspect was done with a FIT screen.  Thanks.

## 2017-10-20 NOTE — PATIENT INSTRUCTIONS
Want another great reason to eat healthy? The food choices you make daily might lower your odds of getting Alzheimer s disease, some scientists say.  Researchers have found that people who stuck to a diet that included foods like berries, leafy greens, and fish had a major drop in their risk for the memory-sapping disorder, which affects more than 5 million Americans over age 65.  The eating plan is called the MIND diet. Here s how it works.  Brain-Friendly Foods  MIND stands for Mediterranean-DASH Intervention for Neurodegenerative Delay. It s similar to two other healthy meal plans: the DASH diet and the Mediterranean diet.   But the MIND approach  specifically includes foods and nutrients that medical literature and data show to be good for the brain, such as berries,  says Xuan Muñiz, ScD, director of nutrition and nutritional epidemiology at [facility].  You eat things from these 10 food groups:  Continue Reading Below   you might like  Green leafy vegetables (like spinach and salad greens): At least six servings a week   Other vegetables: At least one a day   Nuts: Five servings a week   Berries: Two or more servings a week   Beans: At least three servings a week   Whole grains: Three or more servings a day   Fish: Once a week   Poultry (like chicken or turkey): Two times a week   Olive oil: Use it as your main cooking oil.     You avoid:  Red meat: Less than four servings a week   Butter and margarine: Less than a tablespoon daily   Cheese: Less than one serving a week   Pastries and sweets: Less than five servings a week   Fried or fast food: Less than one serving a week  The Benefits  One study showed that people who stuck to the MIND diet lowered their risk of Alzheimer s disease by 54%. That s big. But maybe even more importantly, researchers found that adults who followed the diet only part of the time still cut their risk of the disease by about 35%.   On the other hand,  people who followed the DASH and Mediterranean diets  moderately  had almost no drop in their Alzheimer s risk, Antonino says.  Scientists need to do more research on the MIND approach,  but it s a very promising start. It shows that what you eat can make an impact on whether you develop late-onset Alzheimer s,  which is the most common form of the disease, says Paula Quiroz, a registered dietitian at [facility].      Brown rice    Oats    Whole-wheat flour    Rye flour    Barley    Buckwheat    Bulgur (steamed and dried cracked wheat)    Millet    Quinoa

## 2017-10-20 NOTE — MR AVS SNAPSHOT
After Visit Summary   10/20/2017    Saniya Couch    MRN: [id number]           Patient Information     Date Of Birth          1960        Visit Information        Provider Department      10/20/2017 7:20 AM Marbella Landaverde MD [facility]        Today's Diagnoses     Essential hypertension with goal blood pressure less than 130/80        Type 2 diabetes mellitus without complication, without long-term current use of insulin (H)        Hyperlipidemia LDL goal <130          Care Instructions    Want another great reason to eat healthy? The food choices you make daily might lower your odds of getting Alzheimer s disease, some scientists say.  Researchers have found that people who stuck to a diet that included foods like berries, leafy greens, and fish had a major drop in their risk for the memory-sapping disorder, which affects more than 5 million Americans over age 65.  The eating plan is called the MIND diet. Here s how it works.  Brain-Friendly Foods  MIND stands for Mediterranean-DASH Intervention for Neurodegenerative Delay. It s similar to two other healthy meal plans: the DASH diet and the Mediterranean diet.   But the MIND approach  specifically includes foods and nutrients that medical literature and data show to be good for the brain, such as berries,  says Xuan Muñiz, ScD, director of nutrition and nutritional epidemiology at [facility].  You eat things from these 10 food groups:  Continue Reading Below   you might like  Green leafy vegetables (like spinach and salad greens): At least six servings a week   Other vegetables: At least one a day   Nuts: Five servings a week   Berries: Two or more servings a week   Beans: At least three servings a week   Whole grains: Three or more servings a day   Fish: Once a week   Poultry (like chicken or turkey): Two times a week   Olive oil: Use it as your main cooking oil.     You avoid:  Red meat: Less  than four servings a week   Butter and margarine: Less than a tablespoon daily   Cheese: Less than one serving a week   Pastries and sweets: Less than five servings a week   Fried or fast food: Less than one serving a week  The Benefits  One study showed that people who stuck to the MIND diet lowered their risk of Alzheimer s disease by 54%. That s big. But maybe even more importantly, researchers found that adults who followed the diet only part of the time still cut their risk of the disease by about 35%.   On the other hand, people who followed the DASH and Mediterranean diets  moderately  had almost no drop in their Alzheimer s risk, Antonino says.  Scientists need to do more research on the MIND approach,  but it s a very promising start. It shows that what you eat can make an impact on whether you develop late-onset Alzheimer s,  which is the most common form of the disease, says Paula Quiroz, a registered dietitian at [facility].      Brown rice    Oats    Whole-wheat flour    Rye flour    Barley    Buckwheat    Bulgur (steamed and dried cracked wheat)    Millet    Quinoa            Follow-ups after your visit        Who to contact     If you have questions or need follow up information about today's clinic visit or your schedule please contact [facility] directly at [phone].  Normal or non-critical lab and imaging results will be communicated to you by MyChart, letter or phone within 4 business days after the clinic has received the results. If you do not hear from us within 7 days, please contact the clinic through MyChart or phone. If you have a critical or abnormal lab result, we will notify you by phone as soon as possible.  Submit refill requests through [employer] or call your pharmacy and they will forward the refill request to us. Please allow 3 business days for your refill to be completed.          Additional Information About Your Visit        [employer] Information  "    [employer] lets you send messages to your doctor, view your test results, renew your prescriptions, schedule appointments and more. To sign up, go to [email or web address] . Click on \"Log in\" on the left side of the screen, which will take you to the Welcome page. Then click on \"Sign up Now\" on the right side of the page.     You will be asked to enter the access code listed below, as well as some personal information. Please follow the directions to create your username and password.     Your access code is: [email or web address]  Expires: 2018  7:55 AM     Your access code will  in 90 days. If you need help or a new code, please call your [facility] clinic or [phone].        Care EveryWhere ID     This is your Care EveryWhere ID. This could be used by other organizations to access your [facility] medical records  [id number]        Your Vitals Were     Pulse Temperature Height BMI (Body Mass Index)          74 97.8  F (36.6  C) (Tympanic) 5' 3.75\" (1.619 m) 30.34 kg/m2         Blood Pressure from Last 3 Encounters:   10/20/17 139/72   17 163/80   17 148/79    Weight from Last 3 Encounters:   10/20/17 175 lb 6.4 oz (79.6 kg)   17 173 lb 3.2 oz (78.6 kg)   17 179 lb (81.2 kg)              We Performed the Following     ADMIN MEDICARE: Pneumococcal Vaccine ()     Hemoglobin A1c     Pneumococcal vaccine 23 valent PPSV23  (Pneumovax) [21107]          Today's Medication Changes          These changes are accurate as of: 10/20/17  7:59 AM.  If you have any questions, ask your nurse or doctor.               These medicines have changed or have updated prescriptions.        Dose/Directions    * lisinopril 5 MG tablet   Commonly known as:  PRINIVIL/ZESTRIL   This may have changed:  Another medication with the same name was added. Make sure you understand how and when to take each.   Used for:  Essential hypertension with goal blood pressure less than 130/80   Changed by:  Marbella Landaverde " MD Marely        Dose:  10 mg   Take 2 tablets (10 mg) by mouth daily   Quantity:  90 tablet   Refills:  3       * lisinopril 10 MG tablet   Commonly known as:  PRINIVIL/ZESTRIL   This may have changed:  You were already taking a medication with the same name, and this prescription was added. Make sure you understand how and when to take each.   Used for:  Hyperlipidemia LDL goal <130   Changed by:  Marbella Landaverde MD        Dose:  10 mg   Take 1 tablet (10 mg) by mouth daily   Quantity:  90 tablet   Refills:  3       * Notice:  This list has 2 medication(s) that are the same as other medications prescribed for you. Read the directions carefully, and ask your doctor or other care provider to review them with you.         Where to get your medicines      These medications were sent to [facility] [address] [place] [address] AT [address]  [address] [facility] [address]     Phone:  [phone]     atorvastatin 40 MG tablet    lisinopril 10 MG tablet    metFORMIN 500 MG tablet                Primary Care Provider Office Phone # Fax #    Marbella Landaverde [phone] [address] [facility] [address]        Equal Access to Services     JAYDEN LR AH: Hadii joshua yeboah hadasho Soomaali, waaxda luqadaha, qaybta kaalmada adeegyada, prudence bhatt haykarlyn janina bradley. So [facility] [phone].    ATENCIÓN: Si habla español, tiene a juarez disposición servicios gratuitos de asistencia lingüística. Llame al [phone].    We comply with applicable federal civil rights laws and [place] laws. We do not discriminate on the basis of race, color, national origin, age, disability, sex, sexual orientation, or gender identity.            Thank you!     Thank you for choosing [facility]  for your care. Our goal is always to provide you with excellent care. Hearing back from our patients is one way we can continue to improve our services.  Please take a few minutes to complete the written survey that you may receive in the mail after your visit with us. Thank you!             Your Updated Medication List - Protect others around you: Learn how to safely use, store and throw away your medicines at www.disposemymeds.org.          This list is accurate as of: 10/20/17  7:59 AM.  Always use your most recent med list.                   Brand Name Dispense Instructions for use Diagnosis    atorvastatin 40 MG tablet    LIPITOR    90 tablet    Take 1 tablet (40 mg) by mouth daily    Hyperlipidemia LDL goal <130       blood glucose monitoring lancets     102 Box    1 each daily Use to test blood sugar 1 times daily or as directed.    Type 2 diabetes mellitus without complication, without long-term current use of insulin (H)       blood glucose monitoring test strip    ACCU-CHEK SMARTVIEW    100 each    Use to test blood sugar 2 times daily or as directed.    Type 2 diabetes mellitus without complication, without long-term current use of insulin (H)       CERAVE Crea     1 Bottle    Externally apply topically 2 times daily    Pruritic disorder       * lisinopril 5 MG tablet    PRINIVIL/ZESTRIL    90 tablet    Take 2 tablets (10 mg) by mouth daily    Essential hypertension with goal blood pressure less than 130/80       * lisinopril 10 MG tablet    PRINIVIL/ZESTRIL    90 tablet    Take 1 tablet (10 mg) by mouth daily    Hyperlipidemia LDL goal <130       metFORMIN 500 MG tablet    GLUCOPHAGE    90 tablet    Take 1 tablet (500 mg) by mouth daily (with dinner)    Type 2 diabetes mellitus without complication, without long-term current use of insulin (H)       WOMENS 50+ MULTI VITAMIN/MIN Tabs      Take  by mouth.        * Notice:  This list has 2 medication(s) that are the same as other medications prescribed for you. Read the directions carefully, and ask your doctor or other care provider to review them with you.

## 2017-10-20 NOTE — PROGRESS NOTES
SUBJECTIVE:                                                    Saniya Couch is 57 year old female   Chief Complaint   Patient presents with     Diabetes     Diabetes Follow-up      Patient is checking blood sugars: rarely.  Results range from 120 to 130    Diabetic concerns: None     Symptoms of hypoglycemia (low blood sugar): none     Paresthesias (numbness or burning in feet) or sores: No     Date of last diabetic eye exam: She is due for one this year    Hyperlipidemia Follow-Up      Rate your low fat/cholesterol diet?: fair    Taking statin?  Yes, no muscle aches from statin    Other lipid medications/supplements?:  none    Hypertension Follow-up      Outpatient blood pressures are not being checked.    Low Salt Diet: low salt        Amount of exercise or physical activity: None, but active at work    Problems taking medications regularly: No    Medication side effects: none    Diet: low salt and low fat/cholesterol        Problem list and histories reviewed & adjusted, as indicated.  Additional history: as documented  Patient Active Problem List   Diagnosis     GERD (gastroesophageal reflux disease)     Hyperlipidemia LDL goal <130     Family history of diabetes mellitus     Essential hypertension, benign     Non morbid obesity due to excess calories     Type 2 diabetes mellitus without complication (H)     Special screening for malignant neoplasms, colon     Past Surgical History:   Procedure Laterality Date      SECTION        GALLBLADDER SURGERY         Social History   Substance Use Topics     Smoking status: Never Smoker     Smokeless tobacco: Never Used     Alcohol use No     Family History   Problem Relation Age of Onset     Hypertension Mother      Dementia Mother      HEART DISEASE Father      heart disease     Thyroid Disease Daughter      thyroid disease     DIABETES Brother      DIABETES Brother      DIABETES Brother      DIABETES Brother          Current Outpatient  Prescriptions   Medication Sig Dispense Refill     metFORMIN (GLUCOPHAGE) 500 MG tablet Take 1 tablet (500 mg) by mouth daily (with dinner) 90 tablet 3     atorvastatin (LIPITOR) 40 MG tablet Take 1 tablet (40 mg) by mouth daily 90 tablet 3     lisinopril (PRINIVIL/ZESTRIL) 10 MG tablet Take 1 tablet (10 mg) by mouth daily 90 tablet 3     lisinopril (PRINIVIL/ZESTRIL) 5 MG tablet Take 2 tablets (10 mg) by mouth daily 90 tablet 3     Multiple Vitamins-Minerals (WOMENS 50+ MULTI VITAMIN/MIN) TABS Take  by mouth.       Emollient (CERAVE) CREA Externally apply topically 2 times daily 1 Bottle 1     blood glucose monitoring (ACCU-CHEK FASTCLIX) lancets 1 each daily Use to test blood sugar 1 times daily or as directed. 102 [address] 12     blood glucose monitoring (ACCU-CHEK SMARTVIEW) test strip Use to test blood sugar 2 times daily or as directed. 100 each 12     [DISCONTINUED] metFORMIN (GLUCOPHAGE) 500 MG tablet Take 1 tablet (500 mg) by mouth daily (with dinner) 90 tablet 3     [DISCONTINUED] atorvastatin (LIPITOR) 40 MG tablet Take 1 tablet (40 mg) by mouth daily 90 tablet 3     No Known Allergies  Recent Labs   Lab Test  10/20/17   0806  06/01/17   0809  03/09/17   0758  07/07/16   1554  07/07/16   0839  02/18/16   0754   11/09/10   0920   A1C  6.3*  6.7*  8.2*   --   6.5*  7.4*   < >   --    LDL   --    --   121*   --   93  95   < >  123   HDL   --    --   72   --   50  42*   < >  46*   TRIG   --    --   56   --   111  282*   < >  166*   ALT   --    --   138*   --    --    --    --   21   CR   --    --   0.80  0.67   --   0.71   < >   --    GFRESTIMATED   --    --   73  >90  Non  GFR Calc     --   85   < >   --    GFRESTBLACK   --    --   89  >90   GFR Calc     --   >90   GFR Calc     < >   --    POTASSIUM   --    --   3.8  4.0   --   4.3   < >   --    TSH   --    --   1.85   --   3.12   --    --    --     < > = values in this interval not displayed.      BP Readings from  "Last 3 Encounters:   10/20/17 139/72   09/07/17 163/80   07/11/17 148/79    Wt Readings from Last 3 Encounters:   10/20/17 175 lb 6.4 oz (79.6 kg)   07/11/17 173 lb 3.2 oz (78.6 kg)   03/09/17 179 lb (81.2 kg)         ROS:  Constitutional, HEENT, cardiovascular, pulmonary, gi and gu systems are negative, except as otherwise noted.    OBJECTIVE:                                                    /72  Pulse 74  Temp 97.8  F (36.6  C) (Tympanic)  Ht 5' 3.75\" (1.619 m)  Wt 175 lb 6.4 oz (79.6 kg)  BMI 30.34 kg/m2  GENERAL APPEARANCE ADULT: Alert, no acute distress  HENT: Ears and TMs normal, oral mucosa and posterior oropharynx normal  RESP: lungs clear to auscultation   CV: normal rate, regular rhythm, no murmur or gallop  MS: extremities normal, no peripheral edema  foot exam normal DP and PT pulses, no trophic changes or ulcerative lesions and normal monofilament exam  Diagnostic Test Results:  Results for orders placed or performed in visit on 10/20/17   Hemoglobin A1c   Result Value Ref Range    Hemoglobin A1C 6.3 (H) 4.3 - 6.0 %        ASSESSMENT/PLAN:                                                    1. Type 2 diabetes mellitus without complication, without long-term current use of insulin (H)  At goal, due for immunizations and A1C  - metFORMIN (GLUCOPHAGE) 500 MG tablet; Take 1 tablet (500 mg) by mouth daily (with dinner)  Dispense: 90 tablet; Refill: 3  - Pneumococcal vaccine 23 valent PPSV23  (Pneumovax) [22344]  - ADMIN MEDICARE: Pneumococcal Vaccine ()  - Hemoglobin A1c    2. Essential hypertension with goal blood pressure less than 130/80  At goal, due for review and refill, taking medication without difficulty    3. Hyperlipidemia LDL goal <130  due for labs and refill, taking medication without difficulty  - atorvastatin (LIPITOR) 40 MG tablet; Take 1 tablet (40 mg) by mouth daily  Dispense: 90 tablet; Refill: 3  - lisinopril (PRINIVIL/ZESTRIL) 10 MG tablet; Take 1 tablet (10 mg) by " mouth daily  Dispense: 90 tablet; Refill: 3    4. Need for prophylactic vaccination and inoculation against influenza  - FLU VAC, SPLIT VIRUS IM > 3 YO (QUADRIVALENT) [25377]  - Vaccine Administration, Initial [81074]    5. Special screening for malignant neoplasms, colon  Has HCC score due to colon cancer but does not have colon cancer, not sure were the score came from this is not on problem list.  Unable to find way to delete, will take up with IT.      Marbella Landaverde MD  [facility]

## 2017-10-30 DIAGNOSIS — E78.5 HYPERLIPIDEMIA LDL GOAL <130: ICD-10-CM

## 2017-10-31 RX ORDER — ATORVASTATIN CALCIUM 40 MG/1
TABLET, FILM COATED ORAL
Qty: 90 TABLET | Refills: 0 | OUTPATIENT
Start: 2017-10-31

## 2017-12-04 NOTE — TELEPHONE ENCOUNTER
Writer has tried to jose m [place]el[place] to resolve requests from provider below  Will rout to figure out how to resolve    Routing to provider.    Mckenna BALTAZAR Rn

## 2017-12-06 NOTE — TELEPHONE ENCOUNTER
Dr. Landaverde,    [facility] has been contacted regarding the note below  This is what they have emailed me in regard to the information you are requesting to be removed:  I think she may have gotten the HCC warning because there a diagnosis in her chart for  Special screening for malignant neoplasms, colon.  I ve seen it on a couple encounters. I thought from your last message you wanted a telephone visit removed, is that it? Or is it the diagnosis? If it s the diagnosis, I think she could create an addendum and change the diagnosis.   Thanks, sorry, but I don t want to send the wrong information to IT.   Aisha  Routing to provider.    Mckenna BALTAZAR Rn

## 2017-12-07 NOTE — TELEPHONE ENCOUNTER
The diagnosis is off her problem list, I think we just needed to take it off the Health Maintenance reminder list and it is off there too.  Is there anything else that needs to be done?.

## 2017-12-11 ENCOUNTER — TELEPHONE (OUTPATIENT)
Dept: FAMILY MEDICINE | Facility: CLINIC | Age: 57
End: 2017-12-11

## 2017-12-11 NOTE — TELEPHONE ENCOUNTER
We sent this patient an overdue lab letter on 11/6/17. If they need these results please contact the patient.  Thank you  [facility]

## 2017-12-11 NOTE — TELEPHONE ENCOUNTER
I do not believe so according to [employer].    Routing to provider.    Mckenna BALTAZAR Rn

## 2017-12-14 ENCOUNTER — OFFICE VISIT (OUTPATIENT)
Dept: FAMILY MEDICINE | Facility: CLINIC | Age: 57
End: 2017-12-14
Payer: COMMERCIAL

## 2017-12-14 ENCOUNTER — RADIANT APPOINTMENT (OUTPATIENT)
Dept: GENERAL RADIOLOGY | Facility: CLINIC | Age: 57
End: 2017-12-14
Attending: FAMILY MEDICINE
Payer: COMMERCIAL

## 2017-12-14 VITALS
BODY MASS INDEX: 31.28 KG/M2 | WEIGHT: 180.8 LBS | HEART RATE: 83 BPM | TEMPERATURE: 99.6 F | SYSTOLIC BLOOD PRESSURE: 130 MMHG | DIASTOLIC BLOOD PRESSURE: 76 MMHG

## 2017-12-14 DIAGNOSIS — Z12.11 SPECIAL SCREENING FOR MALIGNANT NEOPLASMS, COLON: ICD-10-CM

## 2017-12-14 DIAGNOSIS — S93.401A SPRAIN OF RIGHT ANKLE, UNSPECIFIED LIGAMENT, INITIAL ENCOUNTER: Primary | ICD-10-CM

## 2017-12-14 DIAGNOSIS — M25.571 PAIN IN JOINT, ANKLE AND FOOT, RIGHT: ICD-10-CM

## 2017-12-14 DIAGNOSIS — E11.9 TYPE 2 DIABETES MELLITUS WITHOUT COMPLICATION, WITHOUT LONG-TERM CURRENT USE OF INSULIN (H): ICD-10-CM

## 2017-12-14 PROCEDURE — 73610 X-RAY EXAM OF ANKLE: CPT | Mod: RT

## 2017-12-14 PROCEDURE — 99214 OFFICE O/P EST MOD 30 MIN: CPT | Performed by: FAMILY MEDICINE

## 2017-12-14 ASSESSMENT — PAIN SCALES - GENERAL: PAINLEVEL: SEVERE PAIN (7)

## 2017-12-14 NOTE — NURSING NOTE
"Initial /76  Pulse 83  Temp 99.6  F (37.6  C) (Tympanic)  Wt 180 lb 12.8 oz (82 kg)  BMI 31.28 kg/m2 Estimated body mass index is 31.28 kg/(m^2) as calculated from the following:    Height as of 10/20/17: 5' 3.75\" (1.619 m).    Weight as of this encounter: 180 lb 12.8 oz (82 kg). .      "

## 2017-12-14 NOTE — MR AVS SNAPSHOT
"              After Visit Summary   12/14/2017    Saniya oCuch    MRN: [id number]           Patient Information     Date Of Birth          1960        Visit Information        Provider Department      12/14/2017 8:20 AM Marbella Landaverde MD [facility]        Today's Diagnoses     Sprain of right ankle, unspecified ligament, initial encounter    -  1    Pain in joint, ankle and foot, right        Type 2 diabetes mellitus without complication, without long-term current use of insulin (H)        Special screening for malignant neoplasms, colon           Follow-ups after your visit        Who to contact     If you have questions or need follow up information about today's clinic visit or your schedule please contact [facility] directly at [phone].  Normal or non-critical lab and imaging results will be communicated to you by S[employer], letter or phone within 4 business days after the clinic has received the results. If you do not hear from us within 7 days, please contact the clinic through Sensinode[employer] or phone. If you have a critical or abnormal lab result, we will notify you by phone as soon as possible.  Submit refill requests through Get[employer]ar or call your pharmacy and they will forward the refill request to us. Please allow 3 business days for your refill to be completed.          Additional Information About Your Visit        [employer] Information     GetJar lets you send messages to your doctor, view your test results, renew your prescriptions, schedule appointments and more. To sign up, go to [email or web address] . Click on \"Log in\" on the left side of the screen, which will take you to the Welcome page. Then click on \"Sign up Now\" on the right side of the page.     You will be asked to enter the access code listed below, as well as some personal information. Please follow the directions to create your username and password.     Your access code is: [email or web address]  Expires: " 2018  6:55 AM     Your access code will  in 90 days. If you need help or a new code, please call your [place] clinic or [phone].        Care EveryWhere ID     This is your Care EveryWhere ID. This could be used by other organizations to access your [place] medical records  [id number]        Your Vitals Were     Pulse Temperature BMI (Body Mass Index)             83 99.6  F (37.6  C) (Tympanic) 31.28 kg/m2          Blood Pressure from Last 3 Encounters:   17 130/76   10/20/17 139/72   17 163/80    Weight from Last 3 Encounters:   17 180 lb 12.8 oz (82 kg)   10/20/17 175 lb 6.4 oz (79.6 kg)   17 173 lb 3.2 oz (78.6 kg)                 Today's Medication Changes          These changes are accurate as of: 17  9:12 AM.  If you have any questions, ask your nurse or doctor.               These medicines have changed or have updated prescriptions.        Dose/Directions    lisinopril 10 MG tablet   Commonly known as:  PRINIVIL/ZESTRIL   This may have changed:  Another medication with the same name was removed. Continue taking this medication, and follow the directions you see here.   Used for:  Hyperlipidemia LDL goal <130   Changed by:  Marbella Landaverde MD        Dose:  10 mg   Take 1 tablet (10 mg) by mouth daily   Quantity:  90 tablet   Refills:  3                Primary Care Provider Office Phone # Fax #    Marbella Landaverde [phone] [address] [facility] [address]        Equal Access to Services     [facility] AH: Hadii joshua tapia Soqi, waaxda luqadaha, qaybta kaalprudence lay . So [facility] [phone].    ATENCIÓN: Si habla español, tiene a juarez disposición servicios gratuitos de asistencia lingüística. Llame al [phone].    We comply with applicable federal civil rights laws and [place] laws. We do not discriminate on the basis of race, color, national origin, age, disability,  sex, sexual orientation, or gender identity.            Thank you!     Thank you for choosing [facility]  for your care. Our goal is always to provide you with excellent care. Hearing back from our patients is one way we can continue to improve our services. Please take a few minutes to complete the written survey that you may receive in the mail after your visit with us. Thank you!             Your Updated Medication List - Protect others around you: Learn how to safely use, store and throw away your medicines at www.disposemymeds.org.          This list is accurate as of: 12/14/17  9:12 AM.  Always use your most recent med list.                   Brand Name Dispense Instructions for use Diagnosis    atorvastatin 40 MG tablet    LIPITOR    90 tablet    Take 1 tablet (40 mg) by mouth daily    Hyperlipidemia LDL goal <130       blood glucose monitoring lancets     102 Box    1 each daily Use to test blood sugar 1 times daily or as directed.    Type 2 diabetes mellitus without complication, without long-term current use of insulin (H)       blood glucose monitoring test strip    ACCU-CHEK SMARTVIEW    100 each    Use to test blood sugar 2 times daily or as directed.    Type 2 diabetes mellitus without complication, without long-term current use of insulin (H)       CERAVE Crea     1 Bottle    Externally apply topically 2 times daily    Pruritic disorder       lisinopril 10 MG tablet    PRINIVIL/ZESTRIL    90 tablet    Take 1 tablet (10 mg) by mouth daily    Hyperlipidemia LDL goal <130       metFORMIN 500 MG tablet    GLUCOPHAGE    90 tablet    Take 1 tablet (500 mg) by mouth daily (with dinner)    Type 2 diabetes mellitus without complication, without long-term current use of insulin (H)       WOMENS 50+ MULTI VITAMIN/MIN Tabs      Take  by mouth.

## 2017-12-14 NOTE — PROGRESS NOTES
SUBJECTIVE:                                                    Saniya Couch is 57 year old female   Chief Complaint   Patient presents with     Ankle Pain     Slipped on wet floor at work 17. Right ankle twisted medially as she fell. Pain from knee to ankle, swelling in ankle and foot. Ice and copmression used to date.         Problem list and histories reviewed & adjusted, as indicated.  Additional history: continues to get computer error of having colon cancer, she has not.  Diabetes at goal.    Patient Active Problem List   Diagnosis     GERD (gastroesophageal reflux disease)     Hyperlipidemia LDL goal <130     Family history of diabetes mellitus     Essential hypertension, benign     Non morbid obesity due to excess calories     Type 2 diabetes mellitus without complication (H)     Special screening for malignant neoplasms, colon     Past Surgical History:   Procedure Laterality Date      SECTION        GALLBLADDER SURGERY         Social History   Substance Use Topics     Smoking status: Never Smoker     Smokeless tobacco: Never Used     Alcohol use No     Family History   Problem Relation Age of Onset     Hypertension Mother      Dementia Mother      HEART DISEASE Father      heart disease     Thyroid Disease Daughter      thyroid disease     DIABETES Brother      DIABETES Brother      DIABETES Brother      DIABETES Brother          Current Outpatient Prescriptions   Medication Sig Dispense Refill     order for DME Equipment being ordered: Trilock ankle brace 1 Units 0     metFORMIN (GLUCOPHAGE) 500 MG tablet Take 1 tablet (500 mg) by mouth daily (with dinner) 90 tablet 3     atorvastatin (LIPITOR) 40 MG tablet Take 1 tablet (40 mg) by mouth daily 90 tablet 3     Multiple Vitamins-Minerals (WOMENS 50+ MULTI VITAMIN/MIN) TABS Take  by mouth.       lisinopril (PRINIVIL/ZESTRIL) 5 MG tablet   3     lisinopril (PRINIVIL/ZESTRIL) 10 MG tablet Take 1 tablet (10 mg) by mouth daily (Patient  not taking: Reported on 12/14/2017) 90 tablet 3     Emollient (CERAVE) CREA Externally apply topically 2 times daily 1 Bottle 1     blood glucose monitoring (ACCU-CHEK FASTCLIX) lancets 1 each daily Use to test blood sugar 1 times daily or as directed. 102 Box 12     blood glucose monitoring (ACCU-CHEK SMARTVIEW) test strip Use to test blood sugar 2 times daily or as directed. 100 each 12     No Known Allergies  Recent Labs   Lab Test  10/20/17   0806  06/01/17   0809  03/09/17   0758  07/07/16   1554  07/07/16   0839  02/18/16   0754   11/09/10   0920   A1C  6.3*  6.7*  8.2*   --   6.5*  7.4*   < >   --    LDL   --    --   121*   --   93  95   < >  123   HDL   --    --   72   --   50  42*   < >  46*   TRIG   --    --   56   --   111  282*   < >  166*   ALT   --    --   138*   --    --    --    --   21   CR   --    --   0.80  0.67   --   0.71   < >   --    GFRESTIMATED   --    --   73  >90  Non  GFR Calc     --   85   < >   --    GFRESTBLACK   --    --   89  >90   GFR Calc     --   >90   GFR Calc     < >   --    POTASSIUM   --    --   3.8  4.0   --   4.3   < >   --    TSH   --    --   1.85   --   3.12   --    --    --     < > = values in this interval not displayed.      BP Readings from Last 3 Encounters:   12/14/17 130/76   10/20/17 139/72   09/07/17 163/80    Wt Readings from Last 3 Encounters:   12/14/17 180 lb 12.8 oz (82 kg)   10/20/17 175 lb 6.4 oz (79.6 kg)   07/11/17 173 lb 3.2 oz (78.6 kg)         ROS:  Constitutional, HEENT, cardiovascular, pulmonary, gi and gu systems are negative, except as otherwise noted.    OBJECTIVE:                                                    /76  Pulse 83  Temp 99.6  F (37.6  C) (Tympanic)  Wt 180 lb 12.8 oz (82 kg)  BMI 31.28 kg/m2  GENERAL APPEARANCE ADULT: Alert, no acute distress  MS: extremities normal, no peripheral edema  Right ankle exam: normal appearance, swelling around the lateral malleolus, bruising  around the dorsal foot, tenderness at the lateral malleolus and dorsal foot, range of motion: guarded but intact, no instability with drawer test or rocking the mortice  SKIN: no suspicious lesions or rashes  NEURO: Alert, oriented, speech and mentation normal  PSYCH: mentation appears normal., affect and mood normal  Diagnostic Test Results:  Xray ankle:   IMPRESSION: There is mild soft tissue swelling overlying the lateral  malleolus. No evidence for acute fracture or dislocation in the right  ankle. The mortise appears intact. Small plantar and Achilles  calcaneal spurs.  Reviewed with Saniya and her .     ASSESSMENT/PLAN:                                                    1. Sprain of right ankle, unspecified ligament, initial encounter  Slow to heal, will set up with splint, is active at work, cleans houses  - order for DME; Equipment being ordered: Trilock ankle brace  Dispense: 1 Units; Refill: 0    2. Pain in joint, ankle and foot, right  - XR Ankle Right G/E 3 Views; Future    3. Type 2 diabetes mellitus without complication, without long-term current use of insulin (H)    4. Special screening for malignant neoplasms, colon  Has not had colon cancer, this is in the system as an error.  Not sure how to get it out, is not on problem list or history.      Marbella Landaverde MD  [facility]

## 2017-12-18 RX ORDER — LISINOPRIL 5 MG/1
TABLET ORAL
Refills: 3 | COMMUNITY
Start: 2017-10-16 | End: 2018-03-28 | Stop reason: DRUGHIGH

## 2018-03-28 ENCOUNTER — HOSPITAL ENCOUNTER (OUTPATIENT)
Dept: ULTRASOUND IMAGING | Facility: CLINIC | Age: 58
Discharge: HOME | End: 2018-03-28
Attending: NURSE PRACTITIONER | Admitting: NURSE PRACTITIONER
Payer: COMMERCIAL

## 2018-03-28 ENCOUNTER — OFFICE VISIT (OUTPATIENT)
Dept: FAMILY MEDICINE | Facility: CLINIC | Age: 58
End: 2018-03-28
Payer: COMMERCIAL

## 2018-03-28 ENCOUNTER — RADIANT APPOINTMENT (OUTPATIENT)
Dept: GENERAL RADIOLOGY | Facility: CLINIC | Age: 58
End: 2018-03-28
Attending: NURSE PRACTITIONER
Payer: COMMERCIAL

## 2018-03-28 VITALS
WEIGHT: 181.6 LBS | BODY MASS INDEX: 31.42 KG/M2 | HEART RATE: 80 BPM | SYSTOLIC BLOOD PRESSURE: 137 MMHG | DIASTOLIC BLOOD PRESSURE: 79 MMHG | OXYGEN SATURATION: 98 % | TEMPERATURE: 97 F

## 2018-03-28 DIAGNOSIS — M25.562 ACUTE PAIN OF LEFT KNEE: ICD-10-CM

## 2018-03-28 DIAGNOSIS — M25.562 ACUTE PAIN OF LEFT KNEE: Primary | ICD-10-CM

## 2018-03-28 PROCEDURE — 76882 US LMTD JT/FCL EVL NVASC XTR: CPT | Mod: LT

## 2018-03-28 PROCEDURE — 99213 OFFICE O/P EST LOW 20 MIN: CPT | Performed by: NURSE PRACTITIONER

## 2018-03-28 PROCEDURE — 73560 X-RAY EXAM OF KNEE 1 OR 2: CPT | Mod: LT

## 2018-03-28 NOTE — LETTER
[facility]  [address] [address]  Phone: [phone]    March 28, 2018        Saniya Couch  [address]  [place] [address]          To whom it may concern:    RE: Saniya Couch    Patient was seen and treated today at our clinic and missed work. Please excuse Saniya from work due to injury.     Please contact me for questions or concerns.      Sincerely,        KATE Marin CNP

## 2018-03-28 NOTE — NURSING NOTE
"Initial /79 (BP Location: Left arm, Patient Position: Chair, Cuff Size: Adult Large)  Pulse 80  Temp 97  F (36.1  C) (Tympanic)  Wt 181 lb 9.6 oz (82.4 kg)  SpO2 98%  BMI 31.42 kg/m2 Estimated body mass index is 31.42 kg/(m^2) as calculated from the following:    Height as of 10/20/17: 5' 3.75\" (1.619 m).    Weight as of this encounter: 181 lb 9.6 oz (82.4 kg). .    Tracee Ennis    "

## 2018-03-28 NOTE — PATIENT INSTRUCTIONS
1. Schedule appointment with Sports Medicine clinic if needed  2. X-ray today of your knee  3. Use Ice for 15-20 minutes at at time several times during the day-  4. Take Tylenol 1000 mg every 6 hrs if needed  5. Wear knee brace for support  6. Schedule ultrasound of your knee

## 2018-03-28 NOTE — MR AVS SNAPSHOT
After Visit Summary   3/28/2018    Saniya Couch    MRN: [id number]           Patient Information     Date Of Birth          1960        Visit Information        Provider Department      3/28/2018 9:20 AM Luna Tirado APRN [facility]        Today's Diagnoses     Acute pain of left knee    -  1      Care Instructions    1. Schedule appointment with Sports Medicine clinic if needed  2. X-ray today of your knee  3. Use Ice for 15-20 minutes at at time several times during the day-  4. Take Tylenol 1000 mg every 6 hrs if needed  5. Wear knee brace for support  6. Schedule ultrasound of your knee              Follow-ups after your visit        Additional Services     ORTHO  REFERRAL       [facility] is referring you to the Orthopedic  Services at [facility] [place].       The  Representative will assist you in the coordination of your Orthopedic and Musculoskeletal Care as prescribed by your physician.    The  Representative will call you within 1 business day to help schedule your appointment, or you may contact the  Representative at:    All areas ~ [phone]     Type of Referral : Non Surgical       Timeframe requested: Routine    Coverage of these services is subject to the terms and limitations of your health insurance plan.  Please call member services at your health plan with any benefit or coverage questions.      If X-rays, CT or MRI's have been performed, please contact the facility where they were done to arrange for , prior to your scheduled appointment.  Please bring this referral request to your appointment and present it to your specialist.                  Future tests that were ordered for you today     Open Future Orders        Priority Expected Expires Ordered    XR Knee Left 1/2 Views Routine 3/28/2018 3/28/2019 3/28/2018    US Lower Extremity Venous Duplex Left Routine   "3/28/2019 3/28/2018            Who to contact     If you have questions or need follow up information about today's clinic visit or your schedule please contact [facility] directly at [phone].  Normal or non-critical lab and imaging results will be communicated to you by MyChart, letter or phone within 4 business days after the clinic has received the results. If you do not hear from us within 7 days, please contact the clinic through MyChart or phone. If you have a critical or abnormal lab result, we will notify you by phone as soon as possible.  Submit refill requests through Victor or call your pharmacy and they will forward the refill request to us. Please allow 3 business days for your refill to be completed.          Additional Information About Your Visit        ison furnitureharHipLogic Information     Victor lets you send messages to your doctor, view your test results, renew your prescriptions, schedule appointments and more. To sign up, go to [email or web address] . Click on \"Log in\" on the left side of the screen, which will take you to the Welcome page. Then click on \"Sign up Now\" on the right side of the page.     You will be asked to enter the access code listed below, as well as some personal information. Please follow the directions to create your username and password.     Your access code is: [email or web address]  Expires: 2018  9:50 AM     Your access code will  in 90 days. If you need help or a new code, please call your [facility] or [phone].        Care EveryWhere ID     This is your Care EveryWhere ID. This could be used by other organizations to access your [place] medical records  [id number]        Your Vitals Were     Pulse Temperature Pulse Oximetry BMI (Body Mass Index)          80 97  F (36.1  C) (Tympanic) 98% 31.42 kg/m2         Blood Pressure from Last 3 Encounters:   18 137/79   17 130/76   10/20/17 139/72    Weight from Last 3 Encounters:   18 " 181 lb 9.6 oz (82.4 kg)   12/14/17 180 lb 12.8 oz (82 kg)   10/20/17 175 lb 6.4 oz (79.6 kg)              We Performed the Following     ORTHO  REFERRAL          Today's Medication Changes          These changes are accurate as of 3/28/18  9:50 AM.  If you have any questions, ask your nurse or doctor.               These medicines have changed or have updated prescriptions.        Dose/Directions    lisinopril 10 MG tablet   Commonly known as:  PRINIVIL/ZESTRIL   This may have changed:  Another medication with the same name was removed. Continue taking this medication, and follow the directions you see here.   Used for:  Hyperlipidemia LDL goal <130        Dose:  10 mg   Take 1 tablet (10 mg) by mouth daily   Quantity:  90 tablet   Refills:  3                Primary Care Provider Office Phone # Fax #    Marbella Marely Landaverde [phone] [address] [facility] [address]        Equal Access to Services     JAYDEN LR AH: Hadii joshua yeboah hadasho Soomaali, waaxda luqadaha, qaybta kaalmada adeegyada, prudence mccoy . So [facility] [phone].    ATENCIÓN: Si habla español, tiene a juarez disposición servicios gratuitos de asistencia lingüística. Llame al [phone].    We comply with applicable federal civil rights laws and [place] laws. We do not discriminate on the basis of race, color, national origin, age, disability, sex, sexual orientation, or gender identity.            Thank you!     Thank you for choosing [facility]  for your care. Our goal is always to provide you with excellent care. Hearing back from our patients is one way we can continue to improve our services. Please take a few minutes to complete the written survey that you may receive in the mail after your visit with us. Thank you!             Your Updated Medication List - Protect others around you: Learn how to safely use, store and throw away your medicines at www.disposemymeds.org.           This list is accurate as of 3/28/18  9:50 AM.  Always use your most recent med list.                   Brand Name Dispense Instructions for use Diagnosis    atorvastatin 40 MG tablet    LIPITOR    90 tablet    Take 1 tablet (40 mg) by mouth daily    Hyperlipidemia LDL goal <130       blood glucose monitoring lancets     102 Box    1 each daily Use to test blood sugar 1 times daily or as directed.    Type 2 diabetes mellitus without complication, without long-term current use of insulin (H)       blood glucose monitoring test strip    ACCU-CHEK SMARTVIEW    100 each    Use to test blood sugar 2 times daily or as directed.    Type 2 diabetes mellitus without complication, without long-term current use of insulin (H)       CERAVE Crea     1 Bottle    Externally apply topically 2 times daily    Pruritic disorder       lisinopril 10 MG tablet    PRINIVIL/ZESTRIL    90 tablet    Take 1 tablet (10 mg) by mouth daily    Hyperlipidemia LDL goal <130       metFORMIN 500 MG tablet    GLUCOPHAGE    90 tablet    Take 1 tablet (500 mg) by mouth daily (with dinner)    Type 2 diabetes mellitus without complication, without long-term current use of insulin (H)       order for DME     1 Units    Equipment being ordered: Trilock ankle brace    Sprain of right ankle, unspecified ligament, initial encounter       WOMENS 50+ MULTI VITAMIN/MIN Tabs      Take  by mouth.

## 2018-03-28 NOTE — PROGRESS NOTES
SUBJECTIVE:   Saniya Couch is a 58 year old female who presents to clinic today for the following health issues:      Joint Pain    Onset: one day    Description:   Location: left leg and knee  Character: Sharp, Dull ache and Gnawing    Intensity: moderate to severe    Progression of Symptoms: worse    Accompanying Signs & Symptoms:  Other symptoms: none    History:   Previous similar pain: YES- hx of knee injury      Precipitating factors:   Trauma or overuse: YES- felt a pull when getting in to car    Alleviating factors:  Improved by: Biofreeze, Tylenol    Therapies Tried and outcome: Tylenol and Biofreeze, helped some    Patient states that pain started behind her knee- went to get into car and felt a pull along the lateral side of her knee. Patient did similar episode 1 year ago and went into ER.       -------------------------------------    Problem list and histories reviewed & adjusted, as indicated.  Additional history: as documented    Patient Active Problem List   Diagnosis     GERD (gastroesophageal reflux disease)     Hyperlipidemia LDL goal <130     Family history of diabetes mellitus     Essential hypertension, benign     Non morbid obesity due to excess calories     Type 2 diabetes mellitus without complication (H)     Special screening for malignant neoplasms, colon     Past Surgical History:   Procedure Laterality Date      SECTION        GALLBLADDER SURGERY         Social History   Substance Use Topics     Smoking status: Never Smoker     Smokeless tobacco: Never Used     Alcohol use No     Family History   Problem Relation Age of Onset     Hypertension Mother      Dementia Mother      HEART DISEASE Father      heart disease     Thyroid Disease Daughter      thyroid disease     DIABETES Brother      DIABETES Brother      DIABETES Brother      DIABETES Brother            Reviewed and updated as needed this visit by clinical staff       Reviewed and updated as needed this visit  by Provider         ROS:  Constitutional, HEENT, cardiovascular, pulmonary, gi and gu systems are negative, except as otherwise noted.    OBJECTIVE:     /79 (BP Location: Left arm, Patient Position: Chair, Cuff Size: Adult Large)  Pulse 80  Temp 97  F (36.1  C) (Tympanic)  Wt 181 lb 9.6 oz (82.4 kg)  SpO2 98%  BMI 31.42 kg/m2  Body mass index is 31.42 kg/(m^2).  GENERAL APPEARANCE: healthy, alert and no distress  ORTHO: Knee Exam: Inspection: No effusion  Tender: LCL, lateral joint line, popliteal region  Active Range of Motion: pain with flexion, full extension, Patellofemoral crepitus with extension  Ambulates with limp    Diagnostic Test Results:  none     ASSESSMENT/PLAN:       1. Acute pain of left knee  ? Sprain vs baker cyst   - ORTHO  REFERRAL  - XR Knee Left 1/2 Views; Future  - US Lower Extremity Venous Duplex Left; Future  - tylenol or NSAIDS prn/ICE prn/ Biofreeze   - wear knee brace for support- patient has at home    Patient Instructions   1. Schedule appointment with Sports Medicine clinic if needed  2. X-ray today of your knee  3. Use Ice for 15-20 minutes at at time several times during the day-  4. Take Tylenol 1000 mg every 6 hrs if needed  5. Wear knee brace for support  6. Schedule ultrasound of your knee          KATE Petersen [facility]

## 2018-04-11 ENCOUNTER — OFFICE VISIT (OUTPATIENT)
Dept: ORTHOPEDICS | Facility: CLINIC | Age: 58
End: 2018-04-11
Payer: COMMERCIAL

## 2018-04-11 VITALS
SYSTOLIC BLOOD PRESSURE: 134 MMHG | BODY MASS INDEX: 30.9 KG/M2 | WEIGHT: 181 LBS | HEIGHT: 64 IN | DIASTOLIC BLOOD PRESSURE: 70 MMHG

## 2018-04-11 DIAGNOSIS — M71.22 SYNOVIAL CYST OF LEFT POPLITEAL SPACE: ICD-10-CM

## 2018-04-11 DIAGNOSIS — M17.12 ARTHRITIS OF LEFT KNEE: Primary | ICD-10-CM

## 2018-04-11 PROCEDURE — 99203 OFFICE O/P NEW LOW 30 MIN: CPT | Performed by: PEDIATRICS

## 2018-04-11 NOTE — LETTER
"    4/11/2018         RE: Saniya Couch  [address]  [facility] [address]        Dear Colleague,    Thank you for referring your patient, Saniya Couch, to the [facility]. Please see a copy of my visit note below.    Sports Medicine Clinic Visit    PCP: Marbella Landaverde    Saniya Couch is a 58 year old female who is seen  in consultation at the request of  Luna Tirado C.N.P. presenting with left knee pain.    Injury: Patient reports an injury ~ 2 weeks ago.  She was getting into the car and \"twisted\" her left knee and leg and felt a \"stretching/tearing\" on the lateral aspect.  No swelling.    Location of Pain: left knee  Duration of Pain: 3/27/18  Rating of Pain at worst: 8/10  Rating of Pain Currently: 6/10  Symptoms are better with: Rest and Biofreeze  Symptoms are worse with: standing, stairs and walking, getting up from kneeling  Additional Features:   Positive: constant aching pain   Negative: swelling, bruising, popping, grinding, catching, locking, instability, paresthesias, numbness, weakness, pain in other joints and systemic symptoms  Other evaluation and/or treatments so far consists of: rest, biofreeze and PCP ordered images  Prior History of related problems: Similar episode ~1 year ago that resolved with rest    Social History: House cleaning    Review of Systems  Skin: no bruising, no swelling  Musculoskeletal: as above  Neurologic: no numbness, paresthesias  Remainder of review of systems is negative including constitutional, CV, pulmonary, GI, except as noted in HPI or medical history.    Patient's current problem list, past medical and surgical history, and family history were reviewed.    Patient Active Problem List   Diagnosis     GERD (gastroesophageal reflux disease)     Hyperlipidemia LDL goal <130     Family history of diabetes mellitus     Essential hypertension, benign     Non morbid obesity due to excess calories     Type 2 " "diabetes mellitus without complication (H)     Special screening for malignant neoplasms, colon     Past Medical History:   Diagnosis Date     Acid reflux      ASCUS favor benign 2007    negative HPV     Helicobacter pylori      Hyperglycemia      Hyperlipidemia      Obesity, unspecified      Ulcer (H) 2005    h-pylori     Past Surgical History:   Procedure Laterality Date      SECTION        GALLBLADDER SURGERY  2005     Family History   Problem Relation Age of Onset     Hypertension Mother      Dementia Mother      HEART DISEASE Father      heart disease     Thyroid Disease Daughter      thyroid disease     DIABETES Brother      DIABETES Brother      DIABETES Brother      DIABETES Brother          Objective  /70 (BP Location: Right arm, Patient Position: Sitting, Cuff Size: Adult Large)  Ht 5' 3.75\" (1.619 m)  Wt 181 lb (82.1 kg)  BMI 31.31 kg/m2    GENERAL APPEARANCE: healthy, alert and no distress   GAIT: NORMAL  SKIN: no suspicious lesions or rashes  HEENT: Sclera clear, anicteric  CV: good peripheral pulses  RESP: Breathing not labored  NEURO: Normal strength and tone, mentation intact and speech normal  PSYCH:  mentation appears normal and affect normal/bright    Bilateral Knee exam    Inspection:      mild posterior swelling left    Patella:      Normal patellar tracking noted through range of motion bilateral    Tender:      medial joint line left - mild    Non Tender:      remainder of knee area bilateral    Knee ROM:      Full active and passive ROM with flexion and extension bilateral    Hip ROM:     Full active and passive ROM bilateral    Strength:      5/5 with knee extension bilateral    Special Tests:     neg (-) Raul left       neg (-) Lachmans left       neg (-) anterior drawer left       neg (-) posterior drawer left       neg (-) varus at 0 deg and 30 deg left       neg (-) valgus at 0 deg and 30 deg left    Gait:      normal    Neurovascular:      2+ peripheral pulses " bilaterally and brisk capillary refill       sensation grossly intact    Radiology  I ordered, visualized and reviewed these images with the patient    Recent Results (from the past 744 hour(s))   XR Knee Standing AP Bilat & Lateral Left    Narrative    KNEE STANDING AP BILATERAL AND LATERAL LEFT   3/28/2018 10:09 AM     HISTORY: Acute pain of left knee.    COMPARISON: 3/30/2017      Impression    IMPRESSION: Mild medial compartment joint space narrowing is seen in  the right knee. The right knee lateral compartment appears preserved.  The left knee appears normal.    SRI CID MD   US Extremity Non Vascular Left    Narrative    NONVASCULAR ULTRASOUND LEFT LOWER EXTREMITY   3/28/2018 11:22 AM    HISTORY: Left knee pain. Evaluate for Baker's cyst.    COMPARISON: None.      Impression    IMPRESSION: There is a well-defined cyst within the popliteal fossa  measuring 4.0 x 1.3 x 1.1 cm consistent with a popliteal cyst. No  abnormality is seen at the indicated site of the patient's pain which  is situated more lateral to the knee.     SHAN FARFAN MD         Assessment:  1. Arthritis of left knee    2. Synovial cyst of left popliteal space      Symptoms likely from a Baker's cyst. I reviewed images and discussed the diagnosis - A baker's cyst is a collection of fluid in the posterior knee, usually from irritation of knee joint pathology, mild arthritis in this case.  I discussed the natural course - Most cysts will resolve on their own with supportive care including rest, ice, compression.  A cyst may rupture causing calf pain.  I discussed possible treatment including supportive care and physical therapy for knee strengthening, trial of intra-articular steroid injection and lastly, US guided evaluation and possible aspiration/injection of the cyst itself.  Imaging is also an option, though not typically necessary.  I discussed the possibility of recurrence.    Plan:  - Today's Plan of Care:  Rehab: Home  Exercise Program  Rest, ice, compression, elevation    -We also discussed other future treatment options:  PT or injection    Follow Up: 4-6 weeks    Concerning signs and symptoms were reviewed.  The patient expressed understanding of this management plan and all questions were answered at this time.    Thanks for the opportunity to participate in the care of this patient, I will keep you updated on their progress.    CC: Luna Crump MD [facility]  Primary Care Sports Medicine  [facility]    Again, thank you for allowing me to participate in the care of your patient.        Sincerely,        Samreen Crump MD

## 2018-04-11 NOTE — PROGRESS NOTES
"Sports Medicine Clinic Visit    PCP: Marbella Landaverde    Saniya Couch is a 58 year old female who is seen  in consultation at the request of  Luna Tirado C.N.P. presenting with left knee pain.    Injury: Patient reports an injury ~ 2 weeks ago.  She was getting into the car and \"twisted\" her left knee and leg and felt a \"stretching/tearing\" on the lateral aspect.  No swelling.    Location of Pain: left knee  Duration of Pain: 3/27/18  Rating of Pain at worst: 8/10  Rating of Pain Currently: 6/10  Symptoms are better with: Rest and Biofreeze  Symptoms are worse with: standing, stairs and walking, getting up from kneeling  Additional Features:   Positive: constant aching pain   Negative: swelling, bruising, popping, grinding, catching, locking, instability, paresthesias, numbness, weakness, pain in other joints and systemic symptoms  Other evaluation and/or treatments so far consists of: rest, biofreeze and PCP ordered images  Prior History of related problems: Similar episode ~1 year ago that resolved with rest    Social History: House cleaning    Review of Systems  Skin: no bruising, no swelling  Musculoskeletal: as above  Neurologic: no numbness, paresthesias  Remainder of review of systems is negative including constitutional, CV, pulmonary, GI, except as noted in HPI or medical history.    Patient's current problem list, past medical and surgical history, and family history were reviewed.    Patient Active Problem List   Diagnosis     GERD (gastroesophageal reflux disease)     Hyperlipidemia LDL goal <130     Family history of diabetes mellitus     Essential hypertension, benign     Non morbid obesity due to excess calories     Type 2 diabetes mellitus without complication (H)     Special screening for malignant neoplasms, colon     Past Medical History:   Diagnosis Date     Acid reflux      ASCUS favor benign 9/2007    negative HPV     Helicobacter pylori      Hyperglycemia      Hyperlipidemia      " "Obesity, unspecified      Ulcer (H)     h-pylori     Past Surgical History:   Procedure Laterality Date      SECTION        GALLBLADDER SURGERY       Family History   Problem Relation Age of Onset     Hypertension Mother      Dementia Mother      HEART DISEASE Father      heart disease     Thyroid Disease Daughter      thyroid disease     DIABETES Brother      DIABETES Brother      DIABETES Brother      DIABETES Brother          Objective  /70 (BP Location: Right arm, Patient Position: Sitting, Cuff Size: Adult Large)  Ht 5' 3.75\" (1.619 m)  Wt 181 lb (82.1 kg)  BMI 31.31 kg/m2    GENERAL APPEARANCE: healthy, alert and no distress   GAIT: NORMAL  SKIN: no suspicious lesions or rashes  HEENT: Sclera clear, anicteric  CV: good peripheral pulses  RESP: Breathing not labored  NEURO: Normal strength and tone, mentation intact and speech normal  PSYCH:  mentation appears normal and affect normal/bright    Bilateral Knee exam    Inspection:      mild posterior swelling left    Patella:      Normal patellar tracking noted through range of motion bilateral    Tender:      medial joint line left - mild    Non Tender:      remainder of knee area bilateral    Knee ROM:      Full active and passive ROM with flexion and extension bilateral    Hip ROM:     Full active and passive ROM bilateral    Strength:      5/5 with knee extension bilateral    Special Tests:     neg (-) Raul left       neg (-) Lachmans left       neg (-) anterior drawer left       neg (-) posterior drawer left       neg (-) varus at 0 deg and 30 deg left       neg (-) valgus at 0 deg and 30 deg left    Gait:      normal    Neurovascular:      2+ peripheral pulses bilaterally and brisk capillary refill       sensation grossly intact    Radiology  I ordered, visualized and reviewed these images with the patient    Recent Results (from the past 744 hour(s))   XR Knee Standing AP Bilat & Lateral Left    Narrative    KNEE STANDING " AP BILATERAL AND LATERAL LEFT   3/28/2018 10:09 AM     HISTORY: Acute pain of left knee.    COMPARISON: 3/30/2017      Impression    IMPRESSION: Mild medial compartment joint space narrowing is seen in  the right knee. The right knee lateral compartment appears preserved.  The left knee appears normal.    SRI CID MD   US Extremity Non Vascular Left    Narrative    NONVASCULAR ULTRASOUND LEFT LOWER EXTREMITY   3/28/2018 11:22 AM    HISTORY: Left knee pain. Evaluate for Baker's cyst.    COMPARISON: None.      Impression    IMPRESSION: There is a well-defined cyst within the popliteal fossa  measuring 4.0 x 1.3 x 1.1 cm consistent with a popliteal cyst. No  abnormality is seen at the indicated site of the patient's pain which  is situated more lateral to the knee.     SHAN FARFAN MD         Assessment:  1. Arthritis of left knee    2. Synovial cyst of left popliteal space      Symptoms likely from a Baker's cyst. I reviewed images and discussed the diagnosis - A baker's cyst is a collection of fluid in the posterior knee, usually from irritation of knee joint pathology, mild arthritis in this case.  I discussed the natural course - Most cysts will resolve on their own with supportive care including rest, ice, compression.  A cyst may rupture causing calf pain.  I discussed possible treatment including supportive care and physical therapy for knee strengthening, trial of intra-articular steroid injection and lastly, US guided evaluation and possible aspiration/injection of the cyst itself.  Imaging is also an option, though not typically necessary.  I discussed the possibility of recurrence.    Plan:  - Today's Plan of Care:  Rehab: Home Exercise Program  Rest, ice, compression, elevation    -We also discussed other future treatment options:  PT or injection    Follow Up: 4-6 weeks    Concerning signs and symptoms were reviewed.  The patient expressed understanding of this management plan and all questions  were answered at this time.    Thanks for the opportunity to participate in the care of this patient, I will keep you updated on their progress.    CC: Luna Crump MD CA[facility]  Primary Care Sports Medicine  [facility]

## 2018-04-11 NOTE — PATIENT INSTRUCTIONS
Plan:  - Today's Plan of Care:  Rehab: Home Exercise Program  Rest, ice, compression, elevation    -We also discussed other future treatment options:  PT or injection    Follow Up: 4-6 weeks    If you have any further questions for your physician or physician s care team you can call [phone] and use option 3 to leave a voice message. Calls received during business hours will be returned same day.    Heel Slides    This exercise is for an injured right knee. Switch sides if the injury is to your left knee.  1. Sit on the floor with your legs straight in front of you.  2. Slide your right heel along the floor toward you, bending your knee and keeping your foot flexed.  3. Move it as close to you as you comfortably can. Hold for 5 to 10 seconds. Then slide your heel back.  4. Repeat 5 times, or as instructed.     Tip: If you re sitting on carpet, put a plastic bag under your heel to make it slide more easily. If you re sitting on a hard floor, put a small towel under your heel.   Date Last Reviewed: 3/10/2016    9224-8577 [employer]. [address] [place] [address]. All rights reserved. This information is not intended as a substitute for professional medical care. Always follow your healthcare professional's instructions.        Quadriceps, Isometric (Strength)    This exercise is for an injured right knee. Switch sides if the injury is to your left knee.  5. Sit on the floor with your right leg straight in front of you. Bend your left knee up and put your left foot flat on the floor.  6. Flex your right foot and tighten the thigh muscles of your right leg. Press the back of your right knee toward the floor. Don t arch your back or hunch your shoulders.  7. Hold for 5 to 10 seconds. Then relax.  8. Repeat 10 times, or as instructed.  9. Do this exercise 3 times a day, or as instructed.  Date Last Reviewed: 3/10/2016    4168-0641 [employer]. [address] [place] [address]  [place] [address]. All rights reserved. This information is not intended as a substitute for professional medical care. Always follow your healthcare professional's instructions.        Knee Rehabilitation: Straight Leg Raises    Begin your rehabilitation with exercises that develop muscle control. These help you meet basic goals, like driving a car or going back to work. Exercise as often as you re advised. But stop right away if any exercise causes sharp or increasing pain. Icing your knee for 15 to 20 minutes after exercise can help prevent swelling and soreness.    Sit or lie on the floor with your injured leg straight and the other leg bent. Point the toes on your injured leg straight up.    Raise your injured leg a few inches off the floor.    Hold for 10 seconds. Repeat 5 times. Rest for a minute, and then do another set. Do 2 to 3 sessions per day.  Note: Do this exercise with toes turned out to strengthen inner thigh muscles.  Date Last Reviewed: 8/16/2015 2000-2017 [employer]. [address] [place] [address], [place] [address]. All rights reserved. This information is not intended as a substitute for professional medical care. Always follow your healthcare professional's instructions.

## 2018-04-11 NOTE — MR AVS SNAPSHOT
After Visit Summary   4/11/2018    Saniya Couch    MRN: [id number]           Patient Information     Date Of Birth          1960        Visit Information        Provider Department      4/11/2018 3:20 PM Samreen Crump MD [facility]        Today's Diagnoses     Arthritis of left knee    -  1    Synovial cyst of left popliteal space          Care Instructions    Plan:  - Today's Plan of Care:  Rehab: Home Exercise Program  Rest, ice, compression, elevation    -We also discussed other future treatment options:  PT or injection    Follow Up: 4-6 weeks    If you have any further questions for your physician or physician s care team you can call [phone] and use option 3 to leave a voice message. Calls received during business hours will be returned same day.    Heel Slides    This exercise is for an injured right knee. Switch sides if the injury is to your left knee.  1. Sit on the floor with your legs straight in front of you.  2. Slide your right heel along the floor toward you, bending your knee and keeping your foot flexed.  3. Move it as close to you as you comfortably can. Hold for 5 to 10 seconds. Then slide your heel back.  4. Repeat 5 times, or as instructed.     Tip: If you re sitting on carpet, put a plastic bag under your heel to make it slide more easily. If you re sitting on a hard floor, put a small towel under your heel.   Date Last Reviewed: 3/10/2016    2024-0097 [employer]. [address] [place] [address]. All rights reserved. This information is not intended as a substitute for professional medical care. Always follow your healthcare professional's instructions.        Quadriceps, Isometric (Strength)    This exercise is for an injured right knee. Switch sides if the injury is to your left knee.  5. Sit on the floor with your right leg straight in front of you. Bend your left knee up and put your left foot flat on  the floor.  6. Flex your right foot and tighten the thigh muscles of your right leg. Press the back of your right knee toward the floor. Don t arch your back or hunch your shoulders.  7. Hold for 5 to 10 seconds. Then relax.  8. Repeat 10 times, or as instructed.  9. Do this exercise 3 times a day, or as instructed.  Date Last Reviewed: 3/10/2016    2029-7521 [employer]. [address] [place] [address]. All rights reserved. This information is not intended as a substitute for professional medical care. Always follow your healthcare professional's instructions.        Knee Rehabilitation: Straight Leg Raises    Begin your rehabilitation with exercises that develop muscle control. These help you meet basic goals, like driving a car or going back to work. Exercise as often as you re advised. But stop right away if any exercise causes sharp or increasing pain. Icing your knee for 15 to 20 minutes after exercise can help prevent swelling and soreness.    Sit or lie on the floor with your injured leg straight and the other leg bent. Point the toes on your injured leg straight up.    Raise your injured leg a few inches off the floor.    Hold for 10 seconds. Repeat 5 times. Rest for a minute, and then do another set. Do 2 to 3 sessions per day.  Note: Do this exercise with toes turned out to strengthen inner thigh muscles.  Date Last Reviewed: 8/16/2015 2000-2017 [employer]. [address] [place] [address]. All rights reserved. This information is not intended as a substitute for professional medical care. Always follow your healthcare professional's instructions.                Follow-ups after your visit        Who to contact     If you have questions or need follow up information about today's clinic visit or your schedule please contact [facility] directly at [phone].  Normal or non-critical lab and imaging results will be  "communicated to you by MyChart, letter or phone within 4 business days after the clinic has received the results. If you do not hear from us within 7 days, please contact the clinic through UniPay[employer] or phone. If you have a critical or abnormal lab result, we will notify you by phone as soon as possible.  Submit refill requests through MyGeekDay or call your pharmacy and they will forward the refill request to us. Please allow 3 business days for your refill to be completed.          Additional Information About Your Visit        MyGeekDay Information     MyGe[employer]Day lets you send messages to your doctor, view your test results, renew your prescriptions, schedule appointments and more. To sign up, go to [email or web address] . Click on \"Log in\" on the left side of the screen, which will take you to the Welcome page. Then click on \"Sign up Now\" on the right side of the page.     You will be asked to enter the access code listed below, as well as some personal information. Please follow the directions to create your username and password.     Your access code is: [email or web address]  Expires: 2018  9:50 AM     Your access code will  in 90 days. If you need help or a new code, please call your [facility] clinic or [phone].        Care EveryWhere ID     This is your Care EveryWhere ID. This could be used by other organizations to access your [facility] medical records  [id number]        Your Vitals Were     Height BMI (Body Mass Index)                5' 3.75\" (1.619 m) 31.31 kg/m2           Blood Pressure from Last 3 Encounters:   18 134/70   18 137/79   17 130/76    Weight from Last 3 Encounters:   18 181 lb (82.1 kg)   18 181 lb 9.6 oz (82.4 kg)   17 180 lb 12.8 oz (82 kg)              Today, you had the following     No orders found for display       Primary Care Provider Office Phone # Fax #    Marbella Marely Landaverde [phone] [address] [facility] " [phone]        Equal Access to Services     [facility] : Hadii joshua yeboah regina Ortiz, waaxda luqadaha, qaybta kaalmada robin, prudence bradley. So [facility] [phone].    ATENCIÓN: Si habla español, tiene a juarez disposición servicios gratuitos de asistencia lingüística. Juaname al [phone].    We comply with applicable federal civil rights laws and [place] laws. We do not discriminate on the basis of race, color, national origin, age, disability, sex, sexual orientation, or gender identity.            Thank you!     Thank you for choosing [facility]  for your care. Our goal is always to provide you with excellent care. Hearing back from our patients is one way we can continue to improve our services. Please take a few minutes to complete the written survey that you may receive in the mail after your visit with us. Thank you!             Your Updated Medication List - Protect others around you: Learn how to safely use, store and throw away your medicines at www.disposemymeds.org.          This list is accurate as of 4/11/18  3:52 PM.  Always use your most recent med list.                   Brand Name Dispense Instructions for use Diagnosis    atorvastatin 40 MG tablet    LIPITOR    90 tablet    Take 1 tablet (40 mg) by mouth daily    Hyperlipidemia LDL goal <130       blood glucose monitoring lancets     102 Box    1 each daily Use to test blood sugar 1 times daily or as directed.    Type 2 diabetes mellitus without complication, without long-term current use of insulin (H)       blood glucose monitoring test strip    ACCU-CHEK SMARTVIEW    100 each    Use to test blood sugar 2 times daily or as directed.    Type 2 diabetes mellitus without complication, without long-term current use of insulin (H)       CERAVE Crea     1 Bottle    Externally apply topically 2 times daily    Pruritic disorder       lisinopril 10 MG tablet    PRINIVIL/ZESTRIL    90 tablet     Take 1 tablet (10 mg) by mouth daily    Hyperlipidemia LDL goal <130       metFORMIN 500 MG tablet    GLUCOPHAGE    90 tablet    Take 1 tablet (500 mg) by mouth daily (with dinner)    Type 2 diabetes mellitus without complication, without long-term current use of insulin (H)       order for DME     1 Units    Equipment being ordered: Trilock ankle brace    Sprain of right ankle, unspecified ligament, initial encounter       WOMENS 50+ MULTI VITAMIN/MIN Tabs      Take  by mouth.

## 2018-05-16 ENCOUNTER — OFFICE VISIT (OUTPATIENT)
Dept: ORTHOPEDICS | Facility: CLINIC | Age: 58
End: 2018-05-16
Payer: COMMERCIAL

## 2018-05-16 VITALS
HEIGHT: 64 IN | BODY MASS INDEX: 30.93 KG/M2 | SYSTOLIC BLOOD PRESSURE: 128 MMHG | DIASTOLIC BLOOD PRESSURE: 64 MMHG | WEIGHT: 181.2 LBS

## 2018-05-16 DIAGNOSIS — M17.12 ARTHRITIS OF LEFT KNEE: Primary | ICD-10-CM

## 2018-05-16 DIAGNOSIS — M71.22 SYNOVIAL CYST OF LEFT POPLITEAL SPACE: ICD-10-CM

## 2018-05-16 PROCEDURE — 20610 DRAIN/INJ JOINT/BURSA W/O US: CPT | Mod: LT | Performed by: PEDIATRICS

## 2018-05-16 PROCEDURE — 99213 OFFICE O/P EST LOW 20 MIN: CPT | Mod: 25 | Performed by: PEDIATRICS

## 2018-05-16 RX ORDER — METHYLPREDNISOLONE ACETATE 40 MG/ML
40 INJECTION, SUSPENSION INTRA-ARTICULAR; INTRALESIONAL; INTRAMUSCULAR; SOFT TISSUE ONCE
Qty: 1 ML | Refills: 0 | OUTPATIENT
Start: 2018-05-16 | End: 2018-05-16

## 2018-05-16 NOTE — PATIENT INSTRUCTIONS
Plan:  - Today's Plan of Care:  Rehab: Physical Therapy: [facility] - [phone]  Steroid injection of the left knee was performed today in clinic  Continue Rest, Ice, Compression, Elevation    -We also discussed other future treatment options:  MRI or US guided procedure with Dr. Gabriel    Follow Up: as needed    If you have any further questions for your physician or physician s care team you can call [phone] and use option 3 to leave a voice message. Calls received during business hours will be returned same day.    After the Injection     After the injection, strenuous and repetitive activity should be minimized for approximately 48 hours.   Ice should be applied to the injected area at least for the next 48 hours.   Apply ice to the injected area at least 3 - 4 times a day for 20 minutes each time for the next 48 hours. This can reduce the painful  flare  reaction that can follow an injection the next day. This reaction can cause the area that was injected to hurt more the next day just from the injection. This will resolve within a day if it does occur.     Use over-the-counter pain medications such as Tylenol to help with the pain if necessary.     After 48 hours, icing the area may be continued if you find it beneficial.     The lidocaine or marcaine (commonly called Novocain) is an anesthetic agent that is injected with the steroid will typically relieve your pain for a few hours following the injection. If the  Novocain  and steroid are injected near a nerve, you may experience local numbness or weakness from the nerve block until it wears off. After this wears off your pain may return until the steroid takes effect.   The steroid may be effective immediately after the injection. Do not be concerned if the injection is not effective in relieving your symptoms immediately. In some cases, it may take up to two weeks for the steroid to work.   If you are diabetic, the corticosteroid may cause  your blood sugar to become elevated for several days following the injection. This response usually lasts about 2-4 days before it returns to your normal level.   You should report any adverse reaction to you doctor. Call if there are any questions.

## 2018-05-16 NOTE — PROGRESS NOTES
"Sports Medicine Clinic Visit - Interim History May 16, 2018    PCP: Marbella Landaverde    Saniya Couch is a 58 year old female who is seen in f/u up for    Arthritis of left knee  Synovial cyst of left popliteal space.  Since last visit on 18 patient has been doing home exercises and reports minimal improvement.    Initial History 2018: Injury: Patient reports an injury ~ 2 weeks ago.  She was getting into the car and \"twisted\" her left knee and leg and felt a \"stretching/tearing\" on the lateral aspect.  No swelling.    Symptoms are better with: Rest and home exercises  Symptoms are worse with: Standing, stairs, walking, kneeling/squatting, getting up  Additional Features:   Positive: Swelling, constant aching pain, catching   Negative: swelling, bruising, popping, grinding, locking, instability, paresthesias, numbness and weakness    Social History: House cleaning    Review of Systems  Skin: no bruising, yes swelling  Musculoskeletal: as above  Neurologic: no numbness, paresthesias  Remainder of review of systems is negative including constitutional, CV, pulmonary, GI, except as noted in HPI or medical history.    Patient's current problem list, past medical and surgical history, and family history were reviewed.    Patient Active Problem List   Diagnosis     GERD (gastroesophageal reflux disease)     Hyperlipidemia LDL goal <130     Family history of diabetes mellitus     Essential hypertension, benign     Non morbid obesity due to excess calories     Type 2 diabetes mellitus without complication (H)     Special screening for malignant neoplasms, colon     Past Medical History:   Diagnosis Date     Acid reflux      ASCUS favor benign 2007    negative HPV     Helicobacter pylori      Hyperglycemia      Hyperlipidemia      Obesity, unspecified      Ulcer (H)     h-pylori     Past Surgical History:   Procedure Laterality Date      SECTION        GALLBLADDER SURGERY  2005     Family " "History   Problem Relation Age of Onset     Hypertension Mother      Dementia Mother      HEART DISEASE Father      heart disease     Thyroid Disease Daughter      thyroid disease     DIABETES Brother      DIABETES Brother      DIABETES Brother      DIABETES Brother        Objective  /64 (BP Location: Left arm, Patient Position: Sitting, Cuff Size: Adult Large)  Ht 5' 3.75\" (1.619 m)  Wt 181 lb 3.2 oz (82.2 kg)  BMI 31.35 kg/m2    GENERAL APPEARANCE: healthy, alert and no distress   GAIT: NORMAL  SKIN: no suspicious lesions or rashes  HEENT: Sclera clear, anicteric  CV: good peripheral pulses  RESP: Breathing not labored  NEURO: Normal strength and tone, mentation intact and speech normal  PSYCH:  mentation appears normal and affect normal/bright    Bilateral Knee exam     Inspection:      mild posterior swelling left     Patella:      Normal patellar tracking noted through range of motion bilateral     Tender:      medial joint line left - mild     Non Tender:      remainder of knee area bilateral     Knee ROM:      Full active and passive ROM with flexion and extension bilateral     Hip ROM:     Full active and passive ROM bilateral     Strength:      5/5 with knee extension bilateral     Special Tests:     neg (-) Raul left       neg (-) Lachmans left       neg (-) anterior drawer left       neg (-) posterior drawer left       neg (-) varus at 0 deg and 30 deg left       neg (-) valgus at 0 deg and 30 deg left     Gait:      normal     Neurovascular:      2+ peripheral pulses bilaterally and brisk capillary refill       sensation grossly intact       Radiology  I ordered, visualized and reviewed these images with the patient  Recent Results (from the past 744 hour(s))   XR Knee Standing AP Bilat & Lateral Left     Narrative     KNEE STANDING AP BILATERAL AND LATERAL LEFT   3/28/2018 10:09 AM      HISTORY: Acute pain of left knee.     COMPARISON: 3/30/2017     Impression     IMPRESSION: Mild medial " compartment joint space narrowing is seen in  the right knee. The right knee lateral compartment appears preserved.  The left knee appears normal.     SRI CID MD   US Extremity Non Vascular Left     Narrative     NONVASCULAR ULTRASOUND LEFT LOWER EXTREMITY   3/28/2018 11:22 AM     HISTORY: Left knee pain. Evaluate for Baker's cyst.     COMPARISON: None.     Impression     IMPRESSION: There is a well-defined cyst within the popliteal fossa  measuring 4.0 x 1.3 x 1.1 cm consistent with a popliteal cyst. No  abnormality is seen at the indicated site of the patient's pain which  is situated more lateral to the knee.        Assessment:  1. Arthritis of left knee    2. Synovial cyst of left popliteal space      Symptoms likely from a Baker's cyst. I reviewed images and discussed the diagnosis - A baker's cyst is a collection of fluid in the posterior knee, usually from irritation of knee joint pathology, mild arthritis in this case.  I discussed the natural course - Most cysts will resolve on their own with supportive care including rest, ice, compression.  A cyst may rupture causing calf pain.  I discussed possible treatment including supportive care and physical therapy for knee strengthening, trial of intra-articular steroid injection and lastly, US guided evaluation and possible aspiration/injection of the cyst itself.  Imaging is also an option, though not typically necessary.  I discussed the possibility of recurrence.  - We discussed the following treatment options given lack of improvement: symptom treatment, activity modification/rest, injection procedure, rehab and referral. Following discussion, plan:  Will trial cortisone injection today and consider MRI or cyst procedure pending clinical course.    Plan:  - Today's Plan of Care:  [phone]  Steroid injection of the left knee was performed today in clinic  Continue Rest, Ice, Compression, Elevation    -We  also discussed other future treatment options:  MRI or US guided procedure with Dr. Gabriel    Follow Up: as needed    Procedure Note:  The risks, benefits and complications of steroid injection were discussed with the patient (including but not limited to: bleeding, infection, pain, scar, damage to adjacent structures, atrophy or necrosis of soft tissue, skin blanching, failure to relieve symptoms, worsening of symptoms, allergic reaction).  After this discussion all questions were addressed and answered and the patient elected to proceed.  Written informed consent was obtained.  The correct procedural site was identified and confirmed.  Using sterile technique, the area was first prepped with betadyne and an alcohol swab.  A 25 gauge  needle was used to inject 40 mg DepoMedrol and 2 cc of 1% lidocaine into the knee using an anterolateral on the left.  Sterile band aid applied.  Saniya  tolerated the procedure well without complications.  Also discussed that if diabetic, recommend close monitoring of blood sugars over the next week as cortisone injections can temporarily elevate blood sugars.  - Of note, first trial of injection unable to inject as needle was likely occluded.  Area was subsequently cleaned again as above and second trial was successful.    Concerning signs and symptoms were reviewed.  The patient expressed understanding of this management plan and all questions were answered at this time.    Samreen Crump MD [facility]  Primary Care Sports Medicine  [facility]

## 2018-05-16 NOTE — MR AVS SNAPSHOT
After Visit Summary   5/16/2018    Saniya Couch    MRN: [id number]           Patient Information     Date Of Birth          1960        Visit Information        Provider Department      5/16/2018 4:00 PM Samreen Crump MD [facility]        Today's Diagnoses     Arthritis of left knee    -  1    Synovial cyst of left popliteal space          Care Instructions    Plan:  - Today's Plan of Care:  Rehab: Physical Therapy: [facility] - [phone]  Steroid injection of the left knee was performed today in clinic  Continue Rest, Ice, Compression, Elevation    -We also discussed other future treatment options:  MRI or US guided procedure with Dr. Gabriel    Follow Up: as needed    If you have any further questions for your physician or physician s care team you can call [phone] and use option 3 to leave a voice message. Calls received during business hours will be returned same day.    After the Injection     After the injection, strenuous and repetitive activity should be minimized for approximately 48 hours.   Ice should be applied to the injected area at least for the next 48 hours.   Apply ice to the injected area at least 3 - 4 times a day for 20 minutes each time for the next 48 hours. This can reduce the painful  flare  reaction that can follow an injection the next day. This reaction can cause the area that was injected to hurt more the next day just from the injection. This will resolve within a day if it does occur.     Use over-the-counter pain medications such as Tylenol to help with the pain if necessary.     After 48 hours, icing the area may be continued if you find it beneficial.     The lidocaine or marcaine (commonly called Novocain) is an anesthetic agent that is injected with the steroid will typically relieve your pain for a few hours following the injection. If the  Novocain  and steroid are injected near a nerve, you may experience  "local numbness or weakness from the nerve block until it wears off. After this wears off your pain may return until the steroid takes effect.   The steroid may be effective immediately after the injection. Do not be concerned if the injection is not effective in relieving your symptoms immediately. In some cases, it may take up to two weeks for the steroid to work.   If you are diabetic, the corticosteroid may cause your blood sugar to become elevated for several days following the injection. This response usually lasts about 2-4 days before it returns to your normal level.   You should report any adverse reaction to you doctor. Call if there are any questions.               Follow-ups after your visit        Additional Services     PHYSICAL THERAPY REFERRAL       *This therapy referral will be filtered to a centralized scheduling office at [facility] and the patient will receive a call to schedule an appointment at a [place] location most convenient for them. *     [facility] provides Physical Therapy evaluation and treatment and many specialty services across the [place] system.  If requesting a specialty program, please choose from the list below.    If you have not heard from the scheduling office within 2 business days, please call [phone] for all locations, with the exception of [place], please call [phone] and [facility], please call [phone]  Treatment: Evaluation & Treatment  Special Instructions/Modalities: None  Special Programs: None    Please be aware that coverage of these services is subject to the terms and limitations of your health insurance plan.  Call member services at your health plan with any benefit or coverage questions.      **Note to Provider:  If you are referring outside of [place] for the therapy appointment, please list the name of the location in the \"special instructions\" above, print the referral and give to the patient to " "schedule the appointment.                  Who to contact     If you have questions or need follow up information about today's clinic visit or your schedule please contact [facility] directly at [phone].  Normal or non-critical lab and imaging results will be communicated to you by MyChart, letter or phone within 4 business days after the clinic has received the results. If you do not hear from us within 7 days, please contact the clinic through MyChart or phone. If you have a critical or abnormal lab result, we will notify you by phone as soon as possible.  Submit refill requests through [employer] or call your pharmacy and they will forward the refill request to us. Please allow 3 business days for your refill to be completed.          Additional Information About Your Visit        [employer] Information     [employer] lets you send messages to your doctor, view your test results, renew your prescriptions, schedule appointments and more. To sign up, go to [email or web address] . Click on \"Log in\" on the left side of the screen, which will take you to the Welcome page. Then click on \"Sign up Now\" on the right side of the page.     You will be asked to enter the access code listed below, as well as some personal information. Please follow the directions to create your username and password.     Your access code is: [email or web address]  Expires: 2018  9:50 AM     Your access code will  in 90 days. If you need help or a new code, please call your [facility] clinic or [phone].        Care EveryWhere ID     This is your Care EveryWhere ID. This could be used by other organizations to access your [facility] medical records  [id number]        Your Vitals Were     Height BMI (Body Mass Index)                5' 3.75\" (1.619 m) 31.35 kg/m2           Blood Pressure from Last 3 Encounters:   18 128/64   18 134/70   18 137/79    Weight from Last 3 Encounters:   18 " 181 lb 3.2 oz (82.2 kg)   04/11/18 181 lb (82.1 kg)   03/28/18 181 lb 9.6 oz (82.4 kg)              We Performed the Following     PHYSICAL THERAPY REFERRAL        Primary Care Provider Office Phone # Fax #    Marbella Marely Landaverde [phone] [address] [facility] [address]        Equal Access to Services     JAYDEN LR : Hadii aad ku hadasho Soomaali, waaxda luqadaha, qaybta kaalmada adeegyada, waxay idiin hayaan adeeg khjonathansh la'karlyn ah. So [facility] [phone].    ATENCIÓN: Si henryla lake, tiene a juarez disposición servicios gratuitos de asistencia lingüística. Llame al [phone].    We comply with applicable federal civil rights laws and [place] laws. We do not discriminate on the basis of race, color, national origin, age, disability, sex, sexual orientation, or gender identity.            Thank you!     Thank you for choosing [facility]  for your care. Our goal is always to provide you with excellent care. Hearing back from our patients is one way we can continue to improve our services. Please take a few minutes to complete the written survey that you may receive in the mail after your visit with us. Thank you!             Your Updated Medication List - Protect others around you: Learn how to safely use, store and throw away your medicines at www.disposemymeds.org.          This list is accurate as of 5/16/18  4:27 PM.  Always use your most recent med list.                   Brand Name Dispense Instructions for use Diagnosis    atorvastatin 40 MG tablet    LIPITOR    90 tablet    Take 1 tablet (40 mg) by mouth daily    Hyperlipidemia LDL goal <130       blood glucose monitoring lancets     102 Box    1 each daily Use to test blood sugar 1 times daily or as directed.    Type 2 diabetes mellitus without complication, without long-term current use of insulin (H)       blood glucose monitoring test strip    ACCU-CHEK SMARTVIEW    100 each    Use to test  blood sugar 2 times daily or as directed.    Type 2 diabetes mellitus without complication, without long-term current use of insulin (H)       CERAVE Crea     1 Bottle    Externally apply topically 2 times daily    Pruritic disorder       lisinopril 10 MG tablet    PRINIVIL/ZESTRIL    90 tablet    Take 1 tablet (10 mg) by mouth daily    Hyperlipidemia LDL goal <130       metFORMIN 500 MG tablet    GLUCOPHAGE    90 tablet    Take 1 tablet (500 mg) by mouth daily (with dinner)    Type 2 diabetes mellitus without complication, without long-term current use of insulin (H)       order for DME     1 Units    Equipment being ordered: Trilock ankle brace    Sprain of right ankle, unspecified ligament, initial encounter       WOMENS 50+ MULTI VITAMIN/MIN Tabs      Take  by mouth.

## 2018-05-16 NOTE — LETTER
"    5/16/2018         RE: Saniya Couch  [address]  [facility] [address]        Dear Colleague,    Thank you for referring your patient, Saniya Couch, to the [facility]. Please see a copy of my visit note below.    Sports Medicine Clinic Visit - Interim History May 16, 2018    PCP: Marbella Landaverde    Saniya Couch is a 58 year old female who is seen in f/u up for    Arthritis of left knee  Synovial cyst of left popliteal space.  Since last visit on 4/11/18 patient has been doing home exercises and reports minimal improvement.    Initial History 4/11/2018: Injury: Patient reports an injury ~ 2 weeks ago.  She was getting into the car and \"twisted\" her left knee and leg and felt a \"stretching/tearing\" on the lateral aspect.  No swelling.    Symptoms are better with: Rest and home exercises  Symptoms are worse with: Standing, stairs, walking, kneeling/squatting, getting up  Additional Features:   Positive: Swelling, constant aching pain, catching   Negative: swelling, bruising, popping, grinding, locking, instability, paresthesias, numbness and weakness    Social History: House cleaning    Review of Systems  Skin: no bruising, yes swelling  Musculoskeletal: as above  Neurologic: no numbness, paresthesias  Remainder of review of systems is negative including constitutional, CV, pulmonary, GI, except as noted in HPI or medical history.    Patient's current problem list, past medical and surgical history, and family history were reviewed.    Patient Active Problem List   Diagnosis     GERD (gastroesophageal reflux disease)     Hyperlipidemia LDL goal <130     Family history of diabetes mellitus     Essential hypertension, benign     Non morbid obesity due to excess calories     Type 2 diabetes mellitus without complication (H)     Special screening for malignant neoplasms, colon     Past Medical History:   Diagnosis Date     Acid reflux      ASCUS favor benign 9/2007 " "   negative HPV     Helicobacter pylori      Hyperglycemia      Hyperlipidemia      Obesity, unspecified      Ulcer (H) 2005    h-pylori     Past Surgical History:   Procedure Laterality Date      SECTION        GALLBLADDER SURGERY       Family History   Problem Relation Age of Onset     Hypertension Mother      Dementia Mother      HEART DISEASE Father      heart disease     Thyroid Disease Daughter      thyroid disease     DIABETES Brother      DIABETES Brother      DIABETES Brother      DIABETES Brother        Objective  /64 (BP Location: Left arm, Patient Position: Sitting, Cuff Size: Adult Large)  Ht 5' 3.75\" (1.619 m)  Wt 181 lb 3.2 oz (82.2 kg)  BMI 31.35 kg/m2    GENERAL APPEARANCE: healthy, alert and no distress   GAIT: NORMAL  SKIN: no suspicious lesions or rashes  HEENT: Sclera clear, anicteric  CV: good peripheral pulses  RESP: Breathing not labored  NEURO: Normal strength and tone, mentation intact and speech normal  PSYCH:  mentation appears normal and affect normal/bright    Bilateral Knee exam     Inspection:      mild posterior swelling left     Patella:      Normal patellar tracking noted through range of motion bilateral     Tender:      medial joint line left - mild     Non Tender:      remainder of knee area bilateral     Knee ROM:      Full active and passive ROM with flexion and extension bilateral     Hip ROM:     Full active and passive ROM bilateral     Strength:      5/5 with knee extension bilateral     Special Tests:     neg (-) Raul left       neg (-) Lachmans left       neg (-) anterior drawer left       neg (-) posterior drawer left       neg (-) varus at 0 deg and 30 deg left       neg (-) valgus at 0 deg and 30 deg left     Gait:      normal     Neurovascular:      2+ peripheral pulses bilaterally and brisk capillary refill       sensation grossly intact       Radiology  I ordered, visualized and reviewed these images with the patient  Recent Results " (from the past 744 hour(s))   XR Knee Standing AP Bilat & Lateral Left     Narrative     KNEE STANDING AP BILATERAL AND LATERAL LEFT   3/28/2018 10:09 AM      HISTORY: Acute pain of left knee.     COMPARISON: 3/30/2017     Impression     IMPRESSION: Mild medial compartment joint space narrowing is seen in  the right knee. The right knee lateral compartment appears preserved.  The left knee appears normal.     SRI CID MD   US Extremity Non Vascular Left     Narrative     NONVASCULAR ULTRASOUND LEFT LOWER EXTREMITY   3/28/2018 11:22 AM     HISTORY: Left knee pain. Evaluate for Baker's cyst.     COMPARISON: None.     Impression     IMPRESSION: There is a well-defined cyst within the popliteal fossa  measuring 4.0 x 1.3 x 1.1 cm consistent with a popliteal cyst. No  abnormality is seen at the indicated site of the patient's pain which  is situated more lateral to the knee.        Assessment:  1. Arthritis of left knee    2. Synovial cyst of left popliteal space      Symptoms likely from a Baker's cyst. I reviewed images and discussed the diagnosis - A baker's cyst is a collection of fluid in the posterior knee, usually from irritation of knee joint pathology, mild arthritis in this case.  I discussed the natural course - Most cysts will resolve on their own with supportive care including rest, ice, compression.  A cyst may rupture causing calf pain.  I discussed possible treatment including supportive care and physical therapy for knee strengthening, trial of intra-articular steroid injection and lastly, US guided evaluation and possible aspiration/injection of the cyst itself.  Imaging is also an option, though not typically necessary.  I discussed the possibility of recurrence.  - We discussed the following treatment options given lack of improvement: symptom treatment, activity modification/rest, injection procedure, rehab and referral. Following discussion, plan:  Will trial cortisone injection today and  consider MRI or cyst procedure pending clinical course.    Plan:  - Today's Plan of Care:  Rehab: Physical Therapy: [facility] - [phone]  Steroid injection of the left knee was performed today in clinic  Continue Rest, Ice, Compression, Elevation    -We also discussed other future treatment options:  MRI or US guided procedure with Dr. Gabriel    Follow Up: as needed    Procedure Note:  The risks, benefits and complications of steroid injection were discussed with the patient (including but not limited to: bleeding, infection, pain, scar, damage to adjacent structures, atrophy or necrosis of soft tissue, skin blanching, failure to relieve symptoms, worsening of symptoms, allergic reaction).  After this discussion all questions were addressed and answered and the patient elected to proceed.  Written informed consent was obtained.  The correct procedural site was identified and confirmed.  Using sterile technique, the area was first prepped with betadyne and an alcohol swab.  A 25 gauge  needle was used to inject 40 mg DepoMedrol and 2 cc of 1% lidocaine into the knee using an anterolateral on the left.  Sterile band aid applied.  Saniya  tolerated the procedure well without complications.  Also discussed that if diabetic, recommend close monitoring of blood sugars over the next week as cortisone injections can temporarily elevate blood sugars.  - Of note, first trial of injection unable to inject as needle was likely occluded.  Area was subsequently cleaned again as above and second trial was successful.    Concerning signs and symptoms were reviewed.  The patient expressed understanding of this management plan and all questions were answered at this time.    Samreen Crump MD CA  Primary Care Sports Medicine  [facility]    Again, thank you for allowing me to participate in the care of your patient.        Sincerely,        Samreen Crump MD

## 2018-06-04 ENCOUNTER — HOSPITAL ENCOUNTER (OUTPATIENT)
Dept: PHYSICAL THERAPY | Facility: CLINIC | Age: 58
Setting detail: THERAPIES SERIES
End: 2018-06-04
Attending: PEDIATRICS
Payer: COMMERCIAL

## 2018-06-04 PROCEDURE — 40000718 ZZHC STATISTIC PT DEPARTMENT ORTHO VISIT: Performed by: PHYSICAL THERAPIST

## 2018-06-04 PROCEDURE — 97162 PT EVAL MOD COMPLEX 30 MIN: CPT | Mod: GP | Performed by: PHYSICAL THERAPIST

## 2018-06-04 PROCEDURE — 97110 THERAPEUTIC EXERCISES: CPT | Mod: GP | Performed by: PHYSICAL THERAPIST

## 2018-06-04 NOTE — PROGRESS NOTES
06/04/18 1600   General Information   Type of Visit Initial OP Ortho PT Evaluation   Start of Care Date 06/04/18   Referring Physician Samreen Crump    Patient/Family Goals Statement Less P w/ sit to stand, Walking longer distances, Stairs, Getting in/out of shower.   Orders Evaluate and Treat   Date of Order 05/16/18   Insurance Type [employer]   Insurance Comments/Visits Authorized Auth'd    Medical Diagnosis OA L Knee, Synovial Cyst L Popliteal Space.    Surgical/Medical history reviewed (High BP, DM, Menopause, OA )   Precautions/Limitations no known precautions/limitations   General Information Comments L Cortisone injection on 5/16/18. Given Ex's this year.    Body Part(s)   Body Part(s) Knee   Presentation and Etiology   Pertinent history of current problem (include personal factors and/or comorbidities that impact the POC) Around April 2017, L leg started to hurt at work, like pulled something, got home could hardly walk so went to Urgent Care. Stayed off legs for several days, slowly got better. Then this year was lifting leg into car and felt pull down lateral side of lower leg, couldn't walk very well. Was walking stiff legged because it didn't hurt as much. Cyst and OA found by X'Ray.    Impairments A. Pain;H. Impaired gait;M. Locking or catching   Functional Limitations perform desired leisure / sports activities;perform required work activities   Symptom Location Post L Leg get pulling/pain. Calf muscle cramps, distal quad. P ant knees when go from sit to stand.    How/Where did it occur From insidious onset   Onset date of current episode/exacerbation 05/16/18  (MD Order )   Chronicity Chronic   Pain rating (0-10 point scale) (2-9/10 )   Pain quality A. Sharp;C. Aching;G. Cramping   Frequency of pain/symptoms C. With activity   Pain/symptoms are: The same all the time   Pain/symptoms exacerbated by B. Walking;G. Certain positions;K. Home tasks;M. Other   Pain exacerbation comment Going from  Sit to Stand    Pain/symptoms eased by A. Sitting;C. Rest;E. Changing positions;I. OTC medication(s)   Progression of symptoms since onset: Improved  (d/t Shot. )   Prior Level of Function   Functional Level Prior Comment Independent w/ ADL's Currently    Current Level of Function   Current Community Support Family/friend caregiver   Patient role/employment history A. Employed   Employment Comments Clean houses, up/down step ladders.    Living environment House/townhome   Home/community accessibility Stairs w/ Railiings. Up>Down.    Fall Risk Screen   Fall screen completed by PT   Have you fallen 2 or more times in the past year? No   Have you fallen and had an injury in the past year? No   Timed Up and Go score (seconds) Walked quickly into dept.    Is patient a fall risk? No   System Outcome Measures   Outcome Measures (LEFS  43/80 )   Functional Scales   Functional Scales OPTIMAL   Optimal (1=able to do without difficulty, 2=able to do with little difficulty, 3=able to do with moderate difficulty, 4=able to do with much difficulty, 5=unable to do, 9=NA) Activity 1;Activity 2;Activity 3   Activity 1 comment A lot of difficulty w/ Walking a mile    Activity 2 comment P w/ going down stairs and moderate difficulty.    Activity 3 comment P w/ Sit to stand.    Knee Objective Findings   Observation No acute distress.    Integumentary  Slight swelling medial knee more than R    Gait/Locomotion More P going down than up stairs.    Foot Position In Standing L>R Pronated.    Knee ROM Comment L Knee Flex in supine reproduces Pull/Pain, Ext Normal.    Knee/Hip Strength Comments L Knee Ext 4+, hip Flex L 4, R 4+. Hip Abd L 4+, R 5.    Knee Flexibility Comments HS's -30 B; L Gas 12, R 12, Sol 19, R 22. Quads L>R Tight.    Anterior Drawer Test Negative    Posterior Drawer Test Negative    Varus Stress Test Negative    Valgus Stress Test Negative    Raul's Test Negative    Knee Special Test Comments Patellar Tracking  different on L. L Patella more lateral  in static position.    Accessory Motion/Joint Mobility Hypo L Patella > R.    Planned Therapy Interventions   Planned Therapy Interventions Comment Kinesiotape, Develop HEP.    Clinical Impression   Criteria for Skilled Therapeutic Interventions Met yes, treatment indicated   PT Diagnosis PFPS, Mm imbalance,    Influenced by the following impairments Pain, Impaired Gait.    Functional limitations due to impairments Work Duties, WAlking for Ex    Clinical Presentation Evolving/Changing   Clinical Presentation Rationale LEFS, MMT, Flexibility, High BP, OA.    Clinical Decision Making (Complexity) Moderate complexity   Therapy Frequency 1 time/week   Predicted Duration of Therapy Intervention (days/wks) 6 weeks. 6 visits.    Risk & Benefits of therapy have been explained Yes   Patient, Family & other staff in agreement with plan of care Yes   Clinical Impression Comments PFPS, Mm imbalance,    Education Assessment   Preferred Learning Style Listening;Demonstration;Pictures/video   Barriers to Learning No barriers   ORTHO GOALS   PT Ortho Eval Goals 1;2;3;4   Ortho Goal 1   Goal Identifier 1   Goal Description STG: Pt able to walk 1 mile w/ Minimal difficulty.    Target Date 07/13/18   Ortho Goal 2   Goal Identifier 2   Goal Description STG: Pt will be able to go down stairs w/o P and minimal difficulty.    Target Date 07/13/18   Ortho Goal 3   Goal Identifier 3   Goal Description STG: Pt will be to sit to stand transition w/o P.    Target Date 07/13/18   Ortho Goal 4   Goal Identifier 4   Goal Description LTG: Pt will be independent /w HEP and Kinesiotape if helpful   Target Date 07/20/18   Total Evaluation Time   Total Evaluation Time 60 Total (Eval x 30, Ex x 25, NM x 5)    Aracely Norman, PT, [facility] (#[id number])  [facility]           [phone]  Fax          [phone]  Appt #      [phone]

## 2018-06-11 ENCOUNTER — HOSPITAL ENCOUNTER (OUTPATIENT)
Dept: PHYSICAL THERAPY | Facility: CLINIC | Age: 58
Setting detail: THERAPIES SERIES
End: 2018-06-11
Attending: PEDIATRICS
Payer: COMMERCIAL

## 2018-06-11 PROCEDURE — 40000718 ZZHC STATISTIC PT DEPARTMENT ORTHO VISIT: Performed by: PHYSICAL THERAPIST

## 2018-06-11 PROCEDURE — 97140 MANUAL THERAPY 1/> REGIONS: CPT | Mod: GP | Performed by: PHYSICAL THERAPIST

## 2018-06-11 PROCEDURE — 97110 THERAPEUTIC EXERCISES: CPT | Mod: GP | Performed by: PHYSICAL THERAPIST

## 2018-06-20 ENCOUNTER — HOSPITAL ENCOUNTER (OUTPATIENT)
Dept: PHYSICAL THERAPY | Facility: CLINIC | Age: 58
Setting detail: THERAPIES SERIES
End: 2018-06-20
Attending: PEDIATRICS
Payer: COMMERCIAL

## 2018-06-20 PROCEDURE — 97110 THERAPEUTIC EXERCISES: CPT | Mod: GP | Performed by: PHYSICAL THERAPIST

## 2018-06-20 PROCEDURE — 40000718 ZZHC STATISTIC PT DEPARTMENT ORTHO VISIT: Performed by: PHYSICAL THERAPIST

## 2018-06-20 PROCEDURE — 97140 MANUAL THERAPY 1/> REGIONS: CPT | Mod: GP | Performed by: PHYSICAL THERAPIST

## 2018-06-25 ENCOUNTER — HOSPITAL ENCOUNTER (OUTPATIENT)
Dept: PHYSICAL THERAPY | Facility: CLINIC | Age: 58
Setting detail: THERAPIES SERIES
End: 2018-06-25
Attending: PEDIATRICS
Payer: COMMERCIAL

## 2018-06-25 PROCEDURE — 97110 THERAPEUTIC EXERCISES: CPT | Mod: GP | Performed by: PHYSICAL THERAPIST

## 2018-06-25 PROCEDURE — 97140 MANUAL THERAPY 1/> REGIONS: CPT | Mod: GP | Performed by: PHYSICAL THERAPIST

## 2018-06-25 PROCEDURE — 40000718 ZZHC STATISTIC PT DEPARTMENT ORTHO VISIT: Performed by: PHYSICAL THERAPIST

## 2018-07-02 ENCOUNTER — HOSPITAL ENCOUNTER (OUTPATIENT)
Dept: PHYSICAL THERAPY | Facility: CLINIC | Age: 58
Setting detail: THERAPIES SERIES
End: 2018-07-02
Attending: PEDIATRICS
Payer: COMMERCIAL

## 2018-07-02 PROCEDURE — 97110 THERAPEUTIC EXERCISES: CPT | Mod: GP | Performed by: PHYSICAL THERAPIST

## 2018-07-02 PROCEDURE — 40000718 ZZHC STATISTIC PT DEPARTMENT ORTHO VISIT: Performed by: PHYSICAL THERAPIST

## 2018-07-02 PROCEDURE — 97140 MANUAL THERAPY 1/> REGIONS: CPT | Mod: GP | Performed by: PHYSICAL THERAPIST

## 2018-07-09 ENCOUNTER — HOSPITAL ENCOUNTER (OUTPATIENT)
Dept: PHYSICAL THERAPY | Facility: CLINIC | Age: 58
Setting detail: THERAPIES SERIES
End: 2018-07-09
Attending: PEDIATRICS
Payer: COMMERCIAL

## 2018-07-09 PROCEDURE — 40000718 ZZHC STATISTIC PT DEPARTMENT ORTHO VISIT: Performed by: PHYSICAL THERAPIST

## 2018-07-09 PROCEDURE — 97140 MANUAL THERAPY 1/> REGIONS: CPT | Mod: GP | Performed by: PHYSICAL THERAPIST

## 2018-07-16 ENCOUNTER — TELEPHONE (OUTPATIENT)
Dept: FAMILY MEDICINE | Facility: CLINIC | Age: 58
End: 2018-07-16

## 2018-07-16 NOTE — TELEPHONE ENCOUNTER
Panel Management Review      Summary:    Patient is due/failing the following:   A1C, FIT, LDL and MAMMOGRAM    Action needed:   Patient needs referral/order    Type of outreach:    Sent letter.    Questions for provider review:    None                                                                                                                                    Estrella CLAYTON CMA       Chart routed to None .

## 2018-07-16 NOTE — LETTER
July 16, 2018      Saniya Couch  [address]  [facility] [address]        Dear Saniya,         At [facility] we care about your health and are committed to providing quality patient care, which includes staying current on preventative cancer screenings.  You can increase your chances of finding and treating cancers through regular screenings.      Our records show that you are due for the following screening(s):      Fit Test for colon cancer -  in clinic   Recommended every year for everyone age 50 and older  Please take a moment to stop by the clinic to  a take home colon/rectal cancer test.      Mammogram for breast cancer - [phone] to schedule  Recommended every 1-2 years for women age 50 and older  Mammograms help detect breast cancer, which is the most common cancer among women in the [place].  You may need to start having mammograms earlier and more often if you have had breast cancer, breast problems, or a family history of breast cancer.     Fasting Lab Testing - [phone] to schedule a lab only appointment  Please make a lab only appointment to complete diabetic and cholesterol testing. Please be fasting (nothing to eat or drink but sips of water for 8-12 hours.    If you have a My-Chart Account, you also can schedule this appointment through there.    If you have already had one or all of the above screening tests at another facility, please call us so that we may update your chart..      Your partners in health,      Quality Committee   [facility]

## 2018-09-21 NOTE — ADDENDUM NOTE
Encounter addended by: Aracely Norman, PT on: 9/21/2018 10:58 AM<BR>     Actions taken: Flowsheet accepted, Sign clinical note, Episode resolved

## 2018-10-16 ENCOUNTER — OFFICE VISIT (OUTPATIENT)
Dept: FAMILY MEDICINE | Facility: CLINIC | Age: 58
End: 2018-10-16
Payer: COMMERCIAL

## 2018-10-16 ENCOUNTER — TELEPHONE (OUTPATIENT)
Dept: FAMILY MEDICINE | Facility: CLINIC | Age: 58
End: 2018-10-16

## 2018-10-16 VITALS
SYSTOLIC BLOOD PRESSURE: 136 MMHG | HEIGHT: 63 IN | OXYGEN SATURATION: 98 % | RESPIRATION RATE: 12 BRPM | DIASTOLIC BLOOD PRESSURE: 82 MMHG | WEIGHT: 184.4 LBS | HEART RATE: 69 BPM | TEMPERATURE: 97.5 F | BODY MASS INDEX: 32.67 KG/M2

## 2018-10-16 DIAGNOSIS — E11.9 DIABETES MELLITUS, TYPE 2 (H): Primary | ICD-10-CM

## 2018-10-16 DIAGNOSIS — Z00.00 ROUTINE GENERAL MEDICAL EXAMINATION AT A HEALTH CARE FACILITY: Primary | ICD-10-CM

## 2018-10-16 DIAGNOSIS — E78.5 HYPERLIPIDEMIA LDL GOAL <130: ICD-10-CM

## 2018-10-16 DIAGNOSIS — E11.9 TYPE 2 DIABETES MELLITUS WITHOUT COMPLICATION, WITHOUT LONG-TERM CURRENT USE OF INSULIN (H): ICD-10-CM

## 2018-10-16 DIAGNOSIS — Z12.11 SPECIAL SCREENING FOR MALIGNANT NEOPLASMS, COLON: ICD-10-CM

## 2018-10-16 DIAGNOSIS — Z23 NEED FOR PROPHYLACTIC VACCINATION AND INOCULATION AGAINST INFLUENZA: ICD-10-CM

## 2018-10-16 LAB
ANION GAP SERPL CALCULATED.3IONS-SCNC: 4 MMOL/L (ref 3–14)
BUN SERPL-MCNC: 25 MG/DL (ref 7–30)
CALCIUM SERPL-MCNC: 9.2 MG/DL (ref 8.5–10.1)
CHLORIDE SERPL-SCNC: 102 MMOL/L (ref 94–109)
CHOLEST SERPL-MCNC: 234 MG/DL
CO2 SERPL-SCNC: 30 MMOL/L (ref 20–32)
CREAT SERPL-MCNC: 0.7 MG/DL (ref 0.52–1.04)
CREAT UR-MCNC: 88 MG/DL
GFR SERPL CREATININE-BSD FRML MDRD: 86 ML/MIN/1.7M2
GLUCOSE SERPL-MCNC: 151 MG/DL (ref 70–99)
HBA1C MFR BLD: 6.6 % (ref 0–5.6)
HDLC SERPL-MCNC: 52 MG/DL
HIV 1+2 AB+HIV1 P24 AG SERPL QL IA: NONREACTIVE
LDLC SERPL CALC-MCNC: 151 MG/DL
MICROALBUMIN UR-MCNC: 8 MG/L
MICROALBUMIN/CREAT UR: 9.27 MG/G CR (ref 0–25)
NONHDLC SERPL-MCNC: 182 MG/DL
POTASSIUM SERPL-SCNC: 3.9 MMOL/L (ref 3.4–5.3)
SODIUM SERPL-SCNC: 136 MMOL/L (ref 133–144)
T4 FREE SERPL-MCNC: 0.87 NG/DL (ref 0.76–1.46)
TRIGL SERPL-MCNC: 155 MG/DL
TSH SERPL DL<=0.005 MIU/L-ACNC: 2.93 MU/L (ref 0.4–4)

## 2018-10-16 PROCEDURE — 99213 OFFICE O/P EST LOW 20 MIN: CPT | Mod: 25 | Performed by: FAMILY MEDICINE

## 2018-10-16 PROCEDURE — 83036 HEMOGLOBIN GLYCOSYLATED A1C: CPT | Performed by: FAMILY MEDICINE

## 2018-10-16 PROCEDURE — 80061 LIPID PANEL: CPT | Performed by: FAMILY MEDICINE

## 2018-10-16 PROCEDURE — 90686 IIV4 VACC NO PRSV 0.5 ML IM: CPT | Performed by: FAMILY MEDICINE

## 2018-10-16 PROCEDURE — G0145 SCR C/V CYTO,THINLAYER,RESCR: HCPCS | Performed by: FAMILY MEDICINE

## 2018-10-16 PROCEDURE — 82043 UR ALBUMIN QUANTITATIVE: CPT | Performed by: FAMILY MEDICINE

## 2018-10-16 PROCEDURE — 80048 BASIC METABOLIC PNL TOTAL CA: CPT | Performed by: FAMILY MEDICINE

## 2018-10-16 PROCEDURE — 87624 HPV HI-RISK TYP POOLED RSLT: CPT | Performed by: FAMILY MEDICINE

## 2018-10-16 PROCEDURE — 99396 PREV VISIT EST AGE 40-64: CPT | Mod: 25 | Performed by: FAMILY MEDICINE

## 2018-10-16 PROCEDURE — 90471 IMMUNIZATION ADMIN: CPT | Performed by: FAMILY MEDICINE

## 2018-10-16 PROCEDURE — 84439 ASSAY OF FREE THYROXINE: CPT | Performed by: FAMILY MEDICINE

## 2018-10-16 PROCEDURE — 87389 HIV-1 AG W/HIV-1&-2 AB AG IA: CPT | Performed by: FAMILY MEDICINE

## 2018-10-16 PROCEDURE — 36415 COLL VENOUS BLD VENIPUNCTURE: CPT | Performed by: FAMILY MEDICINE

## 2018-10-16 PROCEDURE — 99207 C FOOT EXAM  NO CHARGE: CPT | Mod: 25 | Performed by: FAMILY MEDICINE

## 2018-10-16 PROCEDURE — 84443 ASSAY THYROID STIM HORMONE: CPT | Performed by: FAMILY MEDICINE

## 2018-10-16 RX ORDER — LANCETS
1 EACH MISCELLANEOUS DAILY
Qty: 102 EACH | Refills: 11 | Status: SHIPPED | OUTPATIENT
Start: 2018-10-16

## 2018-10-16 RX ORDER — ATORVASTATIN CALCIUM 40 MG/1
40 TABLET, FILM COATED ORAL DAILY
Qty: 90 TABLET | Refills: 3 | Status: SHIPPED | OUTPATIENT
Start: 2018-10-16 | End: 2019-10-21

## 2018-10-16 RX ORDER — LISINOPRIL 10 MG/1
10 TABLET ORAL DAILY
Qty: 90 TABLET | Refills: 3 | Status: SHIPPED | OUTPATIENT
Start: 2018-10-16 | End: 2019-10-21

## 2018-10-16 ASSESSMENT — PAIN SCALES - GENERAL: PAINLEVEL: NO PAIN (0)

## 2018-10-16 NOTE — NURSING NOTE
"Initial /82  Pulse 69  Temp 97.5  F (36.4  C) (Tympanic)  Resp 12  Ht 5' 3.25\" (1.607 m)  Wt 184 lb 6.4 oz (83.6 kg)  SpO2 98%  BMI 32.41 kg/m2 Estimated body mass index is 32.41 kg/(m^2) as calculated from the following:    Height as of this encounter: 5' 3.25\" (1.607 m).    Weight as of this encounter: 184 lb 6.4 oz (83.6 kg). .      "

## 2018-10-16 NOTE — LETTER
October 24, 2018    Saniya Couch  [address] [place] [address]    Dear Saniya,  We are happy to inform you that your PAP smear result from 10/16/18 is normal.  We are now able to do a follow up test on PAP smears. The DNA test is for HPV (Human Papilloma Virus). Cervical cancer is closely linked with certain types of HPV. Your results showed no evidence of high risk HPV.  Therefore we recommend you return in 5 years for your next pap smear and HPV test.  You will still need to return to the clinic every year for an annual exam and other preventive tests.  If you have additional questions regarding this result, please call our registered nurse, Shayy at [phone].  Sincerely,    Marbella Landaverde MD/andrew

## 2018-10-16 NOTE — TELEPHONE ENCOUNTER
[facility] note regarding blood glucose monitoring (ACCU-CHEK SMARTVIEW) test strip  Drug not covered by patient plan. The preferred alternative is ONE TOUCH ULTRA BLUE, ONE TOUCH VERIO. Please call/fax the pharmacy to change medication along with strength, direction, quantity and refills.

## 2018-10-16 NOTE — MR AVS SNAPSHOT
After Visit Summary   10/16/2018    Saniya Couch    MRN: [id number]           Patient Information     Date Of Birth          1960        Visit Information        Provider Department      10/16/2018 7:20 AM Marbella Landaverde MD [facility]        Today's Diagnoses     Special screening for malignant neoplasms, colon    -  1    Hyperlipidemia LDL goal <130        Type 2 diabetes mellitus without complication, without long-term current use of insulin (H)        Need for prophylactic vaccination and inoculation against influenza        Routine general medical examination at a health care facility          Care Instructions      Preventive Health Recommendations  Female Ages 50 - 64    Yearly exam: See your health care provider every year in order to  o Review health changes.   o Discuss preventive care.    o Review your medicines if your doctor has prescribed any.      Get a Pap test every three years (unless you have an abnormal result and your provider advises testing more often).    If you get Pap tests with HPV test, you only need to test every 5 years, unless you have an abnormal result.     You do not need a Pap test if your uterus was removed (hysterectomy) and you have not had cancer.    You should be tested each year for STDs (sexually transmitted diseases) if you're at risk.     Have a mammogram every 1 to 2 years.    Have a colonoscopy at age 50, or have a yearly FIT test (stool test). These exams screen for colon cancer.      Have a cholesterol test every 5 years, or more often if advised.    Have a diabetes test (fasting glucose) every three years. If you are at risk for diabetes, you should have this test more often.     If you are at risk for osteoporosis (brittle bone disease), think about having a bone density scan (DEXA).    Shots: Get a flu shot each year. Get a tetanus shot every 10 years.    Nutrition:     Eat at least 5 servings of fruits and vegetables each  "day.    Eat whole-grain bread, whole-wheat pasta and brown rice instead of white grains and rice.    Get adequate Calcium and Vitamin D.     Lifestyle    Exercise at least 150 minutes a week (30 minutes a day, 5 days a week). This will help you control your weight and prevent disease.    Limit alcohol to one drink per day.    No smoking.     Wear sunscreen to prevent skin cancer.     See your dentist every six months for an exam and cleaning.    See your eye doctor every 1 to 2 years.            Follow-ups after your visit        Future tests that were ordered for you today     Open Future Orders        Priority Expected Expires Ordered    *MA Screening Digital Bilateral Routine  10/16/2019 10/16/2018    Fecal colorectal cancer screen FIT Routine 11/6/2018 1/8/2019 10/16/2018            Who to contact     If you have questions or need follow up information about today's clinic visit or your schedule please contact [facility] directly at [phone].  Normal or non-critical lab and imaging results will be communicated to you by Kids Quizinehart, letter or phone within 4 business days after the clinic has received the results. If you do not hear from us within 7 days, please contact the clinic through CareCloudt or phone. If you have a critical or abnormal lab result, we will notify you by phone as soon as possible.  Submit refill requests through Dataminr or call your pharmacy and they will forward the refill request to us. Please allow 3 business days for your refill to be completed.          Additional Information About Your Visit        Dataminr Information     Dataminr lets you send messages to your doctor, view your test results, renew your prescriptions, schedule appointments and more. To sign up, go to [email or web address] . Click on \"Log in\" on the left side of the screen, which will take you to the Welcome page. Then click on \"Sign up Now\" on the right side of the page.     You will be asked to enter the " "access code listed below, as well as some personal information. Please follow the directions to create your username and password.     Your access code is: [email or web address]  Expires: 2019  7:23 AM     Your access code will  in 90 days. If you need help or a new code, please call your [place] clinic or [phone].        Care EveryWhere ID     This is your Care EveryWhere ID. This could be used by other organizations to access your [place] medical records  [id number]        Your Vitals Were     Pulse Temperature Respirations Height Pulse Oximetry BMI (Body Mass Index)    69 97.5  F (36.4  C) (Tympanic) 12 5' 3.25\" (1.607 m) 98% 32.41 kg/m2       Blood Pressure from Last 3 Encounters:   10/16/18 136/82   18 128/64   18 134/70    Weight from Last 3 Encounters:   10/16/18 184 lb 6.4 oz (83.6 kg)   18 181 lb 3.2 oz (82.2 kg)   18 181 lb (82.1 kg)              We Performed the Following     Albumin Random Urine Quantitative with Creat Ratio     Basic metabolic panel  (Ca, Cl, CO2, Creat, Gluc, K, Na, BUN)     FLU VACCINE, SPLIT VIRUS, IM (QUADRIVALENT) [70201]- >3 YRS     FOOT EXAM     HEMOGLOBIN A1C     HIV Antigen Antibody Combo     HPV High Risk Types DNA Cervical     Lipid panel reflex to direct LDL Fasting     Pap imaged thin layer screen with HPV - recommended age 30 - 65     T4 FREE     TSH     Vaccine Administration, Initial [34217]          Where to get your medicines      These medications were sent to [id number] - [facility] [address] AT [facility] [address] [place]  [address] [facility] [address]     Phone:  [phone]     atorvastatin 40 MG tablet    blood glucose monitoring lancets    blood glucose monitoring test strip    lisinopril 10 MG tablet    metFORMIN 500 MG tablet          Primary Care Provider Office Phone # Fax #    Marbellaenzo Landaverde [phone] [address] [facility] [address]      "   Equal Access to Services     [facility] BE : Hadii aad ku hadlinneaeliza Soqi, waaxda luqadaha, qaybta kaalmarehan kelly, prudence bradley. So [facility] [phone].    ATENCIÓN: Si habla español, tiene a juarez disposición servicios gratuitos de asistencia lingüística. Llame al [phone].    We comply with applicable federal civil rights laws and [place] laws. We do not discriminate on the basis of race, color, national origin, age, disability, sex, sexual orientation, or gender identity.            Thank you!     Thank you for choosing [facility]  for your care. Our goal is always to provide you with excellent care. Hearing back from our patients is one way we can continue to improve our services. Please take a few minutes to complete the written survey that you may receive in the mail after your visit with us. Thank you!             Your Updated Medication List - Protect others around you: Learn how to safely use, store and throw away your medicines at www.disposemymeds.org.          This list is accurate as of 10/16/18  7:58 AM.  Always use your most recent med list.                   Brand Name Dispense Instructions for use Diagnosis    atorvastatin 40 MG tablet    LIPITOR    90 tablet    Take 1 tablet (40 mg) by mouth daily    Hyperlipidemia LDL goal <130       blood glucose monitoring lancets     102 each    1 each daily Use to test blood sugar 1 times daily or as directed.    Type 2 diabetes mellitus without complication, without long-term current use of insulin (H)       blood glucose monitoring test strip    ACCU-CHEK SMARTVIEW    100 each    Use to test blood sugar 2 times daily or as directed.    Type 2 diabetes mellitus without complication, without long-term current use of insulin (H)       CERAVE Crea     1 Bottle    Externally apply topically 2 times daily    Pruritic disorder       lisinopril 10 MG tablet    PRINIVIL/ZESTRIL    90 tablet    Take 1 tablet (10 mg) by  mouth daily    Type 2 diabetes mellitus without complication, without long-term current use of insulin (H)       metFORMIN 500 MG tablet    GLUCOPHAGE    90 tablet    Take 1 tablet (500 mg) by mouth daily (with dinner)    Type 2 diabetes mellitus without complication, without long-term current use of insulin (H)       order for DME     1 Units    Equipment being ordered: Trilock ankle brace    Sprain of right ankle, unspecified ligament, initial encounter       WOMENS 50+ MULTI VITAMIN/MIN Tabs      Take  by mouth.

## 2018-10-16 NOTE — TELEPHONE ENCOUNTER
Prescription approved per [facility] [place].  Claire MIRANDA RN

## 2018-10-16 NOTE — PROGRESS NOTES
SUBJECTIVE:   CC: Saniya Couch is an 58 year old woman who presents for preventive health visit.   Mother recently passed.  Healthy Habits:    Do you get at least three servings of calcium containing foods daily (dairy, green leafy vegetables, etc.)? yes    Amount of exercise or daily activities, outside of work: None due to knee pain.    Problems taking medications regularly No    Medication side effects: No    Have you had an eye exam in the past two years? Yes, last year    Do you see a dentist twice per year? Once    Do you have sleep apnea, excessive snoring or daytime drowsiness?no      Today's PHQ-2 Score:   PHQ-2 (  [employer]) 10/16/2018 3/9/2017   Q1: Little interest or pleasure in doing things 0 0   Q2: Feeling down, depressed or hopeless 1 0   PHQ-2 Score 1 0       Abuse: Current or Past(Physical, Sexual or Emotional)- No  Do you feel safe in your environment - Yes    Social History   Substance Use Topics     Smoking status: Never Smoker     Smokeless tobacco: Never Used     Alcohol use No     If you drink alcohol do you typically have >3 drinks per day or >7 drinks per week? Not Applicable                     Reviewed orders with patient.  Reviewed health maintenance and updated orders accordingly - Yes  BP Readings from Last 3 Encounters:   10/16/18 136/82   18 128/64   18 134/70    Wt Readings from Last 3 Encounters:   10/16/18 184 lb 6.4 oz (83.6 kg)   18 181 lb 3.2 oz (82.2 kg)   18 181 lb (82.1 kg)                  Patient Active Problem List   Diagnosis     GERD (gastroesophageal reflux disease)     Hyperlipidemia LDL goal <130     Family history of diabetes mellitus     Essential hypertension, benign     Non morbid obesity due to excess calories     Type 2 diabetes mellitus without complication (H)     Special screening for malignant neoplasms, colon     Past Surgical History:   Procedure Laterality Date      SECTION        GALLBLADDER SURGERY          Social History   Substance Use Topics     Smoking status: Never Smoker     Smokeless tobacco: Never Used     Alcohol use No     Family History   Problem Relation Age of Onset     Hypertension Mother      Dementia Mother      HEART DISEASE Father      heart disease     Thyroid Disease Daughter      thyroid disease     Diabetes Brother      Diabetes Brother      Diabetes Brother      Diabetes Brother          Current Outpatient Prescriptions   Medication Sig Dispense Refill     atorvastatin (LIPITOR) 40 MG tablet Take 1 tablet (40 mg) by mouth daily 90 tablet 3     blood glucose monitoring (ACCU-CHEK FASTCLIX) lancets 1 each daily Use to test blood sugar 1 times daily or as directed. 102 [address]     blood glucose monitoring (ACCU-CHEK SMARTVIEW) test strip Use to test blood sugar 2 times daily or as directed. 100 each 12     metFORMIN (GLUCOPHAGE) 500 MG tablet Take 1 tablet (500 mg) by mouth daily (with dinner) 90 tablet 3     Multiple Vitamins-Minerals (WOMENS 50+ MULTI VITAMIN/MIN) TABS Take  by mouth.       Emollient (CERAVE) CREA Externally apply topically 2 times daily (Patient not taking: Reported on 10/16/2018) 1 Bottle 1     lisinopril (PRINIVIL/ZESTRIL) 10 MG tablet Take 1 tablet (10 mg) by mouth daily 90 tablet 3     order for DME Equipment being ordered: Trilock ankle brace 1 Units 0     No Known Allergies  Recent Labs   Lab Test  10/20/17   0806  06/01/17   0809  03/09/17   0758  07/07/16   1554  07/07/16   0839  02/18/16   0754   11/09/10   0920   A1C  6.3*  6.7*  8.2*   --   6.5*  7.4*   < >   --    LDL   --    --   121*   --   93  95   < >  123   HDL   --    --   72   --   50  42*   < >  46*   TRIG   --    --   56   --   111  282*   < >  166*   ALT   --    --   138*   --    --    --    --   21   CR   --    --   0.80  0.67   --   0.71   < >   --    GFRESTIMATED   --    --   73  >90  Non  GFR Calc     --   85   < >   --    GFRESTBLACK   --    --   89  >90   GFR Calc      --   >90   GFR Calc     < >   --    POTASSIUM   --    --   3.8  4.0   --   4.3   < >   --    TSH   --    --   1.85   --   3.12   --    --    --     < > = values in this interval not displayed.        Patient over age 50, mutual decision to screen reflected in health maintenance.    Pertinent mammograms are reviewed under the imaging tab.  History of abnormal Pap smear:   Last 3 Pap and HPV Results:   PAP / HPV 2016   PAP NIL NIL     PAP / HPV 2016   PAP NIL NIL     Reviewed and updated as needed this visit by clinical staff  Tobacco  Allergies  Meds  Med Hx  Surg Hx  Fam Hx  Soc Hx        Reviewed and updated as needed this visit by Provider        Past Medical History:   Diagnosis Date     Acid reflux      ASCUS favor benign 2007    negative HPV     Helicobacter pylori      Hyperglycemia      Hyperlipidemia      Obesity, unspecified      Ulcer     h-pylori      Past Surgical History:   Procedure Laterality Date      SECTION        GALLBLADDER SURGERY       Obstetric History       T0      L3     SAB0   TAB0   Ectopic0   Multiple0   Live Births0       # Outcome Date GA Lbr Hair/2nd Weight Sex Delivery Anes PTL Lv   5 Para 09/15/99     CS-Unspec      4 Para 97           3 Para 84           2             1                    ROS:  CONSTITUTIONAL: NEGATIVE for fever, chills, change in weight  INTEGUMENTARY/SKIN: NEGATIVE for worrisome rashes, moles or lesions  EYES: NEGATIVE for vision changes or irritation  ENT: NEGATIVE for ear, mouth and throat problems  RESP: NEGATIVE for significant cough or SOB  BREAST: NEGATIVE for masses, tenderness or discharge  CV: NEGATIVE for chest pain, palpitations or peripheral edema  GI: NEGATIVE for nausea, abdominal pain, heartburn, or change in bowel habits  : NEGATIVE for unusual urinary or vaginal symptoms. No vaginal bleeding.  MUSCULOSKELETAL: NEGATIVE for  "significant arthralgias or myalgia  NEURO: NEGATIVE for weakness, dizziness or paresthesias  PSYCHIATRIC: NEGATIVE for changes in mood or affect     OBJECTIVE:   /82  Pulse 69  Temp 97.5  F (36.4  C) (Tympanic)  Resp 12  Ht 5' 3.25\" (1.607 m)  Wt 184 lb 6.4 oz (83.6 kg)  SpO2 98%  BMI 32.41 kg/m2  EXAM:  GENERAL APPEARANCE: healthy, alert and no distress  EYES: Eyes grossly normal to inspection, PERRL and conjunctivae and sclerae normal  HENT: ear canals and TM's normal, nose and mouth without ulcers or lesions, oropharynx clear and oral mucous membranes moist  NECK: no adenopathy, no asymmetry, masses, or scars and thyroid normal to palpation  RESP: lungs clear to auscultation - no rales, rhonchi or wheezes  BREAST: normal without masses, tenderness or nipple discharge and no palpable axillary masses or adenopathy  CV: regular rate and rhythm, normal S1 S2, no S3 or S4, no murmur, click or rub, no peripheral edema and peripheral pulses strong  ABDOMEN: soft, nontender, no hepatosplenomegaly, no masses and bowel sounds normal   (female): normal female external genitalia, normal urethral meatus, vaginal mucosal atrophy noted and normal cervix, adnexae, and uterus without masses.  MS: no musculoskeletal defects are noted and gait is age appropriate without ataxia  SKIN: no suspicious lesions or rashes  NEURO: Normal strength and tone, sensory exam grossly normal, mentation intact and speech normal  PSYCH: mentation appears normal and affect normal/bright    Diagnostic Test Results:  Results for orders placed or performed in visit on 10/16/18   HEMOGLOBIN A1C   Result Value Ref Range    Hemoglobin A1C 6.6 (H) 0 - 5.6 %   Albumin Random Urine Quantitative with Creat Ratio   Result Value Ref Range    Creatinine Urine 88 mg/dL    Albumin Urine mg/L 8 mg/L    Albumin Urine mg/g Cr 9.27 0 - 25 mg/g Cr   Basic metabolic panel  (Ca, Cl, CO2, Creat, Gluc, K, Na, BUN)   Result Value Ref Range    Sodium 136 133 - " 144 mmol/L    Potassium 3.9 3.4 - 5.3 mmol/L    Chloride 102 94 - 109 mmol/L    Carbon Dioxide 30 20 - 32 mmol/L    Anion Gap 4 3 - 14 mmol/L    Glucose 151 (H) 70 - 99 mg/dL    Urea Nitrogen 25 7 - 30 mg/dL    Creatinine 0.70 0.52 - 1.04 mg/dL    GFR Estimate 86 >60 mL/min/1.7m2    GFR Estimate If Black >90 >60 mL/min/1.7m2    Calcium 9.2 8.5 - 10.1 mg/dL   Lipid panel reflex to direct LDL Fasting   Result Value Ref Range    Cholesterol 234 (H) <200 mg/dL    Triglycerides 155 (H) <150 mg/dL    HDL Cholesterol 52 >49 mg/dL    LDL Cholesterol Calculated 151 (H) <100 mg/dL    Non HDL Cholesterol 182 (H) <130 mg/dL   TSH   Result Value Ref Range    TSH 2.93 0.40 - 4.00 mU/L   T4 FREE   Result Value Ref Range    T4 Free 0.87 0.76 - 1.46 ng/dL         ASSESSMENT/PLAN:   1. Routine general medical examination at a health care facility  Due for pap and mammogram, will screen for HIV  - Pap imaged thin layer screen with HPV - recommended age 30 - 65  - HPV High Risk Types DNA Cervical  - *MA Screening Digital Bilateral; Future  - HIV Antigen Antibody Combo    2. Special screening for malignant neoplasms, colon  - Fecal colorectal cancer screen FIT; Future    3. Hyperlipidemia LDL goal <130  due for labs and refill, taking medication without difficulty  - Lipid panel reflex to direct LDL Fasting  - atorvastatin (LIPITOR) 40 MG tablet; Take 1 tablet (40 mg) by mouth daily  Dispense: 90 tablet; Refill: 3    4. Type 2 diabetes mellitus without complication, without long-term current use of insulin (H)  due for labs and refill, taking medication without difficulty  - HEMOGLOBIN A1C  - Albumin Random Urine Quantitative with Creat Ratio  - Basic metabolic panel  (Ca, Cl, CO2, Creat, Gluc, K, Na, BUN)  - Lipid panel reflex to direct LDL Fasting  - blood glucose monitoring (ACCU-CHEK FASTCLIX) lancets; 1 each daily Use to test blood sugar 1 times daily or as directed.  Dispense: 102 each; Refill: 11  - metFORMIN (GLUCOPHAGE) 500 MG  "tablet; Take 1 tablet (500 mg) by mouth daily (with dinner)  Dispense: 90 tablet; Refill: 3  - lisinopril (PRINIVIL/ZESTRIL) 10 MG tablet; Take 1 tablet (10 mg) by mouth daily  Dispense: 90 tablet; Refill: 3  - FOOT EXAM  - TSH  - T4 FREE  - OFFICE/OUTPT VISIT,EST,LEVL III    5. Need for prophylactic vaccination and inoculation against influenza  - FLU VACCINE, SPLIT VIRUS, IM (QUADRIVALENT) [65442]- >3 YRS  - Vaccine Administration, Initial [25078]    COUNSELING:   Reviewed preventive health counseling, as reflected in patient instructions    BP Readings from Last 1 Encounters:   10/16/18 136/82     Estimated body mass index is 32.41 kg/(m^2) as calculated from the following:    Height as of this encounter: 5' 3.25\" (1.607 m).    Weight as of this encounter: 184 lb 6.4 oz (83.6 kg).      Weight management plan: Discussed healthy diet and exercise guidelines and patient will follow up in 3 months in clinic to re-evaluate.     reports that she has never smoked. She has never used smokeless tobacco.      Counseling Resources:  ATP IV Guidelines  Pooled Cohorts Equation Calculator  Breast Cancer Risk Calculator  FRAX Risk Assessment  ICSI Preventive Guidelines  Dietary Guidelines for Americans, 2010  USDA's MyPlate  ASA Prophylaxis  Lung CA Screening    Marbella Landaverde MD  [facility]    Injectable Influenza Immunization Documentation    1.  Is the person to be vaccinated sick today?   No    2. Does the person to be vaccinated have an allergy to a component   of the vaccine?   No  Egg Allergy Algorithm Link    3. Has the person to be vaccinated ever had a serious reaction   to influenza vaccine in the past?   No    4. Has the person to be vaccinated ever had Guillain-Barré syndrome?   No    Form completed by Estrella CLAYTON CMA           "

## 2018-10-16 NOTE — PROGRESS NOTES
Glucose and a1c HIGH BUT AT GOAL, rest of labs normal. Plan awaiting pap results.  Please notify.        Thank you. PHILIPPE TRAN MD

## 2018-10-17 PROCEDURE — 82274 ASSAY TEST FOR BLOOD FECAL: CPT | Performed by: FAMILY MEDICINE

## 2018-10-18 LAB
COPATH REPORT: NORMAL
PAP: NORMAL

## 2018-10-19 DIAGNOSIS — Z12.11 SPECIAL SCREENING FOR MALIGNANT NEOPLASMS, COLON: ICD-10-CM

## 2018-10-19 NOTE — LETTER
October 22, 2018      Saniya Couch  [address]  [facility] [address]        Dear ,    We are writing to inform you of your test results.  FIT test neg for blood. Repeat yearly or get colonoscopy every 10 years to screen for colon cancer. If further questions or problems notify me.    Resulted Orders   Fecal colorectal cancer screen FIT   Result Value Ref Range    Occult Blood Scn FIT Negative NEG^Negative       If you have any questions or concerns, please call the clinic at the number listed above.       Sincerely,        Dr. Landaverde/santos

## 2018-10-20 LAB — HEMOCCULT STL QL IA: NEGATIVE

## 2018-10-22 LAB
FINAL DIAGNOSIS: NORMAL
HPV HR 12 DNA CVX QL NAA+PROBE: NEGATIVE
HPV16 DNA SPEC QL NAA+PROBE: NEGATIVE
HPV18 DNA SPEC QL NAA+PROBE: NEGATIVE
SPECIMEN DESCRIPTION: NORMAL
SPECIMEN SOURCE CVX/VAG CYTO: NORMAL

## 2019-03-07 ENCOUNTER — TELEPHONE (OUTPATIENT)
Dept: FAMILY MEDICINE | Facility: CLINIC | Age: 59
End: 2019-03-07

## 2019-03-07 NOTE — LETTER
March 7, 2019      Saniya Couch  [address]  [facility] [place] [address]        Dear Saniya,         At [facility] we care about your health and are committed to providing quality patient care, which includes staying current on preventative cancer screenings.  You can increase your chances of finding and treating cancers through regular screenings.      Our records show that you are due for the following screening(s):      Mammogram for Breast Cancer - [phone] to schedule  Recommended every 1-2 years for women age 50 and older  Mammograms help detect breast cancer, which is the most common cancer among women in the [place].  You may need to start having mammograms earlier and more often if you have had breast cancer, breast problems, or a family history of breast cancer.         If you have a My-Chart Account, you also can schedule this appointment through there.    If you have already had one or all of the above screening tests at another facility, please call us so that we may update your chart.      Your partners in health,      Quality Committee   [facility]

## 2019-03-07 NOTE — TELEPHONE ENCOUNTER
Panel Management Review      Summary:    Patient is due/failing the following:   MAMMOGRAM    Action needed:   Patient needs referral/order:     Type of outreach:    Sent letter.    Questions for provider review:    None                                                                                                                                    Estrella CLAYTON CMA       Chart routed to None .

## 2019-08-27 ENCOUNTER — HOSPITAL ENCOUNTER (EMERGENCY)
Facility: CLINIC | Age: 59
Discharge: HOME | End: 2019-08-27
Attending: NURSE PRACTITIONER | Admitting: NURSE PRACTITIONER
Payer: COMMERCIAL

## 2019-08-27 VITALS
OXYGEN SATURATION: 99 % | WEIGHT: 180 LBS | HEIGHT: 64 IN | BODY MASS INDEX: 30.73 KG/M2 | HEART RATE: 62 BPM | SYSTOLIC BLOOD PRESSURE: 170 MMHG | TEMPERATURE: 99.1 F | DIASTOLIC BLOOD PRESSURE: 75 MMHG

## 2019-08-27 DIAGNOSIS — M26.621 TMJ TENDERNESS, RIGHT: ICD-10-CM

## 2019-08-27 PROCEDURE — G0463 HOSPITAL OUTPT CLINIC VISIT: HCPCS

## 2019-08-27 PROCEDURE — 99213 OFFICE O/P EST LOW 20 MIN: CPT | Mod: Z6 | Performed by: NURSE PRACTITIONER

## 2019-08-27 PROCEDURE — 99282 EMERGENCY DEPT VISIT SF MDM: CPT | Performed by: NURSE PRACTITIONER

## 2019-08-27 ASSESSMENT — MIFFLIN-ST. JEOR: SCORE: 1366.47

## 2019-08-27 NOTE — ED AVS SNAPSHOT
[facility] Emergency Department  [address] [address]  Phone:  [phone]  Fax:  [phone]                                    Saniya Couch   MRN: [id number]    Department:  [facility] Emergency Department   Date of Visit:  8/27/2019           After Visit Summary Signature Page    I have received my discharge instructions, and my questions have been answered. I have discussed any challenges I see with this plan with the nurse or doctor.    ..........................................................................................................................................  Patient/Patient Representative Signature      ..........................................................................................................................................  Patient Representative Print Name and Relationship to Patient    ..................................................               ................................................  Date                                   Time    ..........................................................................................................................................  Reviewed by Signature/Title    ...................................................              ..............................................  Date                                               Time          22EPIC Rev 08/18

## 2019-08-27 NOTE — ED PROVIDER NOTES
History     Chief Complaint   Patient presents with     Otalgia     pain is now going down neck and into jaw      HPI    SUBJECTIVE: Saniya Couch  is here today because of:Ear Pain and right jaw pain  The patient has had symptoms of earache and right jaw pain.   Onset of symptoms was 10 days ago. Course of illness is waxing and waning.  Eating is aggravating.  Patient denies exposure to illness at home or work/school.   Patient denies fever, cough, sore throat, nasal congestion/runny nose, nausea, vomiting, diarrhea, headache, facial pressure, sinus pain, ear discharge, swollen glands, chest congestion, wheezing, myalgias, itching in eyes, mattering conjuntivitis, decreased appetite and decreased activity  Treatment measures tried include acetaminophen.  Patient is not a smoker    Allergies:  No Known Allergies    Problem List:    Patient Active Problem List    Diagnosis Date Noted     Special screening for malignant neoplasms, colon 10/20/2017     Priority: Medium     Type 2 diabetes mellitus without complication (H) 2016     Priority: Medium     Non morbid obesity due to excess calories 2016     Priority: Medium     ideal weight 120, goal weight 180 by 12       Essential hypertension, benign 2013     Priority: Medium     Family history of diabetes mellitus 2012     Priority: Medium     GERD (gastroesophageal reflux disease) 2010     Priority: Medium     Hyperlipidemia LDL goal <130 2010     Priority: Medium        Past Medical History:    Past Medical History:   Diagnosis Date     Acid reflux      ASCUS favor benign 2007     Helicobacter pylori      Hyperglycemia      Hyperlipidemia      Obesity, unspecified      Ulcer        Past Surgical History:    Past Surgical History:   Procedure Laterality Date      SECTION        GALLBLADDER SURGERY         Family History:    Family History   Problem Relation Age of Onset     Hypertension Mother       "Dementia Mother      Heart Disease Father         heart disease     Thyroid Disease Daughter         thyroid disease     Diabetes Brother      Diabetes Brother      Diabetes Brother      Diabetes Brother        Social History:  Marital Status:   [2]  Social History     Tobacco Use     Smoking status: Never Smoker     Smokeless tobacco: Never Used   Substance Use Topics     Alcohol use: No     Drug use: No        Medications:      atorvastatin (LIPITOR) 40 MG tablet   blood glucose monitoring (ACCU-CHEK FASTCLIX) lancets   blood glucose monitoring (NO BRAND SPECIFIED) test strip   lisinopril (PRINIVIL/ZESTRIL) 10 MG tablet   metFORMIN (GLUCOPHAGE) 500 MG tablet   Multiple Vitamins-Minerals (WOMENS 50+ MULTI VITAMIN/MIN) TABS   order for DME       Review of Systems  As mentioned above in the history present illness. All other systems were reviewed and are negative.    Physical Exam   BP: (!) 175/75  Pulse: 62  Temp: 99.1  F (37.3  C)  Height: 162.6 cm (5' 4\")  Weight: 81.6 kg (180 lb)  SpO2: 99 %      Physical Exam  GENERAL: alert, no acute distress and no apparent distress  EYES:  Right conjunctiva is not injected and without discharge.  Left conjunctiva is not injected and without discharge.  EARS: Right TM is normal: no effusions, no erythema, and normal landmarks.  Left TM is normal: no effusions, no erythema, and normal landmarks.  NOSE: Nasal mucosa is normal.  Sinus not tender.  THROAT: normal.  NECK: supple with no adenopathy  CARDIAC:NORMAL - regular rate and rhythm without murmur.  RESP: Normal - CTA without rales, rhonchi, or wheezing.  SKIN: normal    ED Course        Procedures    No results found for this or any previous visit (from the past 24 hour(s)).    Medications - No data to display    Assessments & Plan (with Medical Decision Making)     I have reviewed the nursing notes.    I have reviewed the findings, diagnosis, plan and need for follow up with the patient.    Medical Decision " Making:  CXR is not indicated.  Rapid Strep test is not indicated.     Assessment:  1) TMJ pain.    PLAN:  Reviewed with patient application of heat and cold for TMJ pain.  Discussed ibuprofen 400 mg 3 times daily as needed.  Recommend follow-up with primary care if ongoing persistent pain and discussed possibility of acupuncture versus physical therapy as alternative options.  Patient verbalized understanding regarding all of the above.  Patient has no evidence on exam of otitis externa or otitis media or lymphadenopathy or pharyngitis.    New Prescriptions    No medications on file       Final diagnoses:   TMJ tenderness, right       8/27/2019   [facility] EMERGENCY DEPARTMENT     Luna Salas, KATE CNP  08/27/19 1820

## 2019-10-17 NOTE — PROGRESS NOTES
SUBJECTIVE:   CC: Saniya Couch is an 59 year old woman who presents for preventive health visit.       States nasal drainage in throat and cough, worse in the morning  Refill diabetes meds  Elevated blood pressure    Healthy Habits:    Do you get at least three servings of calcium containing foods daily (dairy, green leafy vegetables, etc.)? yes    Amount of exercise or daily activities, outside of work: No    Problems taking medications regularly No    Medication side effects: No    Have you had an eye exam in the past two years? yes    Do you see a dentist twice per year? yes    Do you have sleep apnea, excessive snoring or daytime drowsiness?no      Diabetes Follow-up      How often are you checking your blood sugar?Occasionaly    What time of day are you checking your blood sugars (select all that apply)?  Before meals     Have you had any blood sugars above 200?  No    Have you had any blood sugars below 70?  No    What symptoms do you notice when your blood sugar is low?  None    What concerns do you have today about your diabetes? None     Do you have any of these symptoms? (Select all that apply)  Excessive thirst occasionally     Have you had a diabetic eye exam in the last 12 months? Yes- Date of last eye exam: .    Diabetes Management Resources    Hyperlipidemia Follow-Up      Are you having any of the following symptoms? (Select all that apply)  No complaints of shortness of breath, chest pain or pressure.  No increased sweating or nausea with activity.  No left-sided neck or arm pain.  No complaints of pain in calves when walking 1-2 blocks.    Are you regularly taking any medication or supplement to lower your cholesterol?   Yes- .    Are you having muscle aches or other side effects that you think could be caused by your cholesterol lowering medication?  Yes- occasionally    Hypertension Follow-up      Do you check your blood pressure regularly outside of the clinic? No     Are you following a  low salt diet? No    Are your blood pressures ever more than 140 on the top number (systolic) OR more   than 90 on the bottom number (diastolic), for example 140/90? No    BP Readings from Last 2 Encounters:   10/21/19 (!) 144/78   08/27/19 (!) 170/75     Hemoglobin A1C (%)   Date Value   10/21/2019 6.7 (H)   10/16/2018 6.6 (H)     LDL Cholesterol Calculated (mg/dL)   Date Value   10/16/2018 151 (H)   03/09/2017 121 (H)       Today's PHQ-2 Score:   PHQ-2 ( 1999 [employer]) 10/21/2019 10/16/2018   Q1: Little interest or pleasure in doing things 0 0   Q2: Feeling down, depressed or hopeless 0 1   PHQ-2 Score 0 1       Abuse: Current or Past (Physical, Sexual or Emotional)- No  Do you feel safe in your environment? Yes    Social History     Tobacco Use     Smoking status: Never Smoker     Smokeless tobacco: Never Used   Substance Use Topics     Alcohol use: No     If you drink alcohol do you typically have >3 drinks per day or >7 drinks per week? Not Applicable                     Reviewed orders with patient.  Reviewed health maintenance and updated orders accordingly - Yes  Labs reviewed in [facility]    Mammogram Screening: Patient over age 50, mutual decision to screen reflected in health maintenance.    Pertinent mammograms are reviewed under the imaging tab.  History of abnormal Pap smear: NO - age 30- 65 PAP every 3 years recommended  PAP / HPV Latest Ref Rng & Units 10/16/2018 2/18/2016 8/29/2011   PAP - NIL NIL NIL   HPV 16 DNA NEG:Negative Negative - -   HPV 18 DNA NEG:Negative Negative - -   OTHER HR HPV NEG:Negative Negative - -     Reviewed and updated as needed this visit by clinical staff  Tobacco  Allergies  Meds  Problems  Med Hx  Surg Hx  Fam Hx  Soc Hx          Reviewed and updated as needed this visit by Provider  Tobacco  Allergies  Meds  Problems  Med Hx  Surg Hx  Fam Hx        Past Medical History:   Diagnosis Date     Acid reflux      ASCUS favor benign 9/2007    negative HPV      "Helicobacter pylori      Hyperglycemia      Hyperlipidemia      Obesity, unspecified      Ulcer     h-pylori      Past Surgical History:   Procedure Laterality Date      SECTION        GALLBLADDER SURGERY       OB History    Para Term  AB Living   5 3 0 0 0 3   SAB TAB Ectopic Multiple Live Births   0 0 0 0 0      # Outcome Date GA Lbr Hair/2nd Weight Sex Delivery Anes PTL Lv   5 Para 09/15/99     CS-Unspec      4 Para 97           3 Para 84           2             1                 ROS:  CONSTITUTIONAL: NEGATIVE for fever, chills, change in weight  INTEGUMENTARY/SKIN: NEGATIVE for worrisome rashes, moles or lesions  EYES: NEGATIVE for vision changes or irritation  ENT: NEGATIVE for ear, mouth and throat problems  RESP: NEGATIVE for significant cough or SOB  BREAST: NEGATIVE for masses, tenderness or discharge  CV: NEGATIVE for chest pain, palpitations or peripheral edema  GI: NEGATIVE for nausea, abdominal pain, heartburn, or change in bowel habits  : NEGATIVE for unusual urinary or vaginal symptoms. No vaginal bleeding.  MUSCULOSKELETAL: NEGATIVE for significant arthralgias or myalgia  NEURO: NEGATIVE for weakness, dizziness or paresthesias  PSYCHIATRIC: NEGATIVE for changes in mood or affect     OBJECTIVE:   BP (!) 144/78   Pulse 71   Temp 97.2  F (36.2  C) (Tympanic)   Resp 12   Ht 1.613 m (5' 3.5\")   Wt 82.2 kg (181 lb 3.2 oz)   SpO2 98%   BMI 31.59 kg/m    EXAM:  GENERAL APPEARANCE: healthy, alert and no distress  EYES: Eyes grossly normal to inspection, PERRL and conjunctivae and sclerae normal  HENT: ear canals and TM's normal, nose and mouth without ulcers or lesions, oropharynx clear and oral mucous membranes moist  NECK: no adenopathy, no asymmetry, masses, or scars and thyroid normal to palpation  RESP: lungs clear to auscultation - no rales, rhonchi or wheezes  BREAST: normal without masses, tenderness or nipple discharge and no " palpable axillary masses or adenopathy  CV: regular rate and rhythm, normal S1 S2, no S3 or S4, no murmur, click or rub, no peripheral edema and peripheral pulses strong  ABDOMEN: soft, nontender, no hepatosplenomegaly, no masses and bowel sounds normal  MS: no musculoskeletal defects are noted and gait is age appropriate without ataxia  Diabetic foot exam: normal DP and PT pulses, no trophic changes or ulcerative lesions and normal sensory exam  SKIN: no suspicious lesions or rashes  NEURO: Normal strength and tone, sensory exam grossly normal, mentation intact and speech normal  PSYCH: mentation appears normal and affect normal/bright    Diagnostic Test Results:  Labs reviewed in Epic    ASSESSMENT/PLAN:   1. Routine general medical examination at a health care facility  Low risk cervical cancer, low risk breast cancer.  - MA Screening Digital Bilateral; Future  - Fecal colorectal cancer screen FIT; Future    2. Type 2 diabetes mellitus without complication, without long-term current use of insulin (H)  Not at goal for blood pressure, consider increasing lisinopril to 20 mg.  - Hemoglobin A1c  - Basic metabolic panel  - Lipid panel reflex to direct LDL Fasting  - Albumin Random Urine Quantitative with Creat Ratio  - lisinopril (PRINIVIL/ZESTRIL) 10 MG tablet; Take 1 tablet (10 mg) by mouth daily  Dispense: 90 tablet; Refill: 3  - metFORMIN (GLUCOPHAGE) 500 MG tablet; Take 1 tablet (500 mg) by mouth daily (with dinner)  Dispense: 90 tablet; Refill: 3    3. Hyperlipidemia LDL goal <130  due for labs and refill, taking medication without difficulty  - atorvastatin (LIPITOR) 40 MG tablet; Take 1 tablet (40 mg) by mouth daily  Dispense: 90 tablet; Refill: 3    4. Need for prophylactic vaccination and inoculation against influenza  - INFLUENZA VACCINE IM > 6 MONTHS VALENT IIV4 [02820]  - Vaccine Administration, Initial [23089]    5. Essential hypertension, benign  Increase lisinopril to 20 mg and recheck blood pressure  "in a month.    COUNSELING:   Reviewed preventive health counseling, as reflected in patient instructions    Estimated body mass index is 31.59 kg/m  as calculated from the following:    Height as of this encounter: 1.613 m (5' 3.5\").    Weight as of this encounter: 82.2 kg (181 lb 3.2 oz).    Weight management plan: Discussed healthy diet and exercise guidelines     reports that she has never smoked. She has never used smokeless tobacco.      Counseling Resources:  ATP IV Guidelines  Pooled Cohorts Equation Calculator  Breast Cancer Risk Calculator  FRAX Risk Assessment  ICSI Preventive Guidelines  Dietary Guidelines for Americans, 2010  USDA's MyPlate  ASA Prophylaxis  Lung CA Screening    Marbella Landaverde MD  [facility]  "

## 2019-10-21 ENCOUNTER — OFFICE VISIT (OUTPATIENT)
Dept: FAMILY MEDICINE | Facility: CLINIC | Age: 59
End: 2019-10-21
Payer: COMMERCIAL

## 2019-10-21 VITALS
RESPIRATION RATE: 12 BRPM | TEMPERATURE: 97.2 F | SYSTOLIC BLOOD PRESSURE: 144 MMHG | OXYGEN SATURATION: 98 % | HEART RATE: 71 BPM | BODY MASS INDEX: 30.93 KG/M2 | WEIGHT: 181.2 LBS | HEIGHT: 64 IN | DIASTOLIC BLOOD PRESSURE: 78 MMHG

## 2019-10-21 DIAGNOSIS — E11.9 TYPE 2 DIABETES MELLITUS WITHOUT COMPLICATION, WITHOUT LONG-TERM CURRENT USE OF INSULIN (H): ICD-10-CM

## 2019-10-21 DIAGNOSIS — Z00.00 ROUTINE GENERAL MEDICAL EXAMINATION AT A HEALTH CARE FACILITY: Primary | ICD-10-CM

## 2019-10-21 DIAGNOSIS — I10 ESSENTIAL HYPERTENSION, BENIGN: ICD-10-CM

## 2019-10-21 DIAGNOSIS — E78.5 HYPERLIPIDEMIA LDL GOAL <130: ICD-10-CM

## 2019-10-21 DIAGNOSIS — Z23 NEED FOR PROPHYLACTIC VACCINATION AND INOCULATION AGAINST INFLUENZA: ICD-10-CM

## 2019-10-21 LAB
ANION GAP SERPL CALCULATED.3IONS-SCNC: 3 MMOL/L (ref 3–14)
BUN SERPL-MCNC: 26 MG/DL (ref 7–30)
CALCIUM SERPL-MCNC: 9.1 MG/DL (ref 8.5–10.1)
CHLORIDE SERPL-SCNC: 105 MMOL/L (ref 94–109)
CHOLEST SERPL-MCNC: 179 MG/DL
CO2 SERPL-SCNC: 28 MMOL/L (ref 20–32)
CREAT SERPL-MCNC: 0.7 MG/DL (ref 0.52–1.04)
CREAT UR-MCNC: 85 MG/DL
GFR SERPL CREATININE-BSD FRML MDRD: >90 ML/MIN/{1.73_M2}
GLUCOSE SERPL-MCNC: 160 MG/DL (ref 70–99)
HBA1C MFR BLD: 6.7 % (ref 0–5.6)
HDLC SERPL-MCNC: 55 MG/DL
LDLC SERPL CALC-MCNC: 92 MG/DL
MICROALBUMIN UR-MCNC: 6 MG/L
MICROALBUMIN/CREAT UR: 7.55 MG/G CR (ref 0–25)
NONHDLC SERPL-MCNC: 124 MG/DL
POTASSIUM SERPL-SCNC: 4 MMOL/L (ref 3.4–5.3)
SODIUM SERPL-SCNC: 136 MMOL/L (ref 133–144)
TRIGL SERPL-MCNC: 160 MG/DL

## 2019-10-21 PROCEDURE — 80061 LIPID PANEL: CPT | Performed by: FAMILY MEDICINE

## 2019-10-21 PROCEDURE — 90471 IMMUNIZATION ADMIN: CPT | Performed by: FAMILY MEDICINE

## 2019-10-21 PROCEDURE — 99396 PREV VISIT EST AGE 40-64: CPT | Mod: 25 | Performed by: FAMILY MEDICINE

## 2019-10-21 PROCEDURE — 36415 COLL VENOUS BLD VENIPUNCTURE: CPT | Performed by: FAMILY MEDICINE

## 2019-10-21 PROCEDURE — 82043 UR ALBUMIN QUANTITATIVE: CPT | Performed by: FAMILY MEDICINE

## 2019-10-21 PROCEDURE — 83036 HEMOGLOBIN GLYCOSYLATED A1C: CPT | Performed by: FAMILY MEDICINE

## 2019-10-21 PROCEDURE — 99214 OFFICE O/P EST MOD 30 MIN: CPT | Mod: 25 | Performed by: FAMILY MEDICINE

## 2019-10-21 PROCEDURE — 90686 IIV4 VACC NO PRSV 0.5 ML IM: CPT | Performed by: FAMILY MEDICINE

## 2019-10-21 PROCEDURE — 80048 BASIC METABOLIC PNL TOTAL CA: CPT | Performed by: FAMILY MEDICINE

## 2019-10-21 PROCEDURE — 99207 C FOOT EXAM  NO CHARGE: CPT | Mod: 25 | Performed by: FAMILY MEDICINE

## 2019-10-21 RX ORDER — LISINOPRIL 10 MG/1
20 TABLET ORAL DAILY
Qty: 180 TABLET | Refills: 3 | Status: SHIPPED | OUTPATIENT
Start: 2019-10-21 | End: 2019-12-30

## 2019-10-21 RX ORDER — ATORVASTATIN CALCIUM 40 MG/1
40 TABLET, FILM COATED ORAL DAILY
Qty: 90 TABLET | Refills: 3 | Status: SHIPPED | OUTPATIENT
Start: 2019-10-21 | End: 2020-11-02

## 2019-10-21 RX ORDER — LISINOPRIL 10 MG/1
10 TABLET ORAL DAILY
Qty: 90 TABLET | Refills: 3 | Status: SHIPPED | OUTPATIENT
Start: 2019-10-21 | End: 2019-10-21

## 2019-10-21 ASSESSMENT — MIFFLIN-ST. JEOR: SCORE: 1363.98

## 2019-10-21 NOTE — NURSING NOTE
"Initial BP (!) 144/78   Pulse 71   Temp 97.2  F (36.2  C) (Tympanic)   Resp 12   Ht 1.613 m (5' 3.5\")   Wt 82.2 kg (181 lb 3.2 oz)   SpO2 98%   BMI 31.59 kg/m   Estimated body mass index is 31.59 kg/m  as calculated from the following:    Height as of this encounter: 1.613 m (5' 3.5\").    Weight as of this encounter: 82.2 kg (181 lb 3.2 oz). .      "

## 2019-10-21 NOTE — LETTER
October 21, 2019      Saniya Couch  [address] [facility] [address]        Dear ,    We are writing to inform you of your test results.    Glucose and A1C bit high but at goal, rest of labs are normal    Resulted Orders   Hemoglobin A1c   Result Value Ref Range    Hemoglobin A1C 6.7 (H) 0 - 5.6 %      Comment:      Normal <5.7% Prediabetes 5.7-6.4%  Diabetes 6.5% or higher - adopted from ADA   consensus guidelines.     Basic metabolic panel   Result Value Ref Range    Sodium 136 133 - 144 mmol/L    Potassium 4.0 3.4 - 5.3 mmol/L    Chloride 105 94 - 109 mmol/L    Carbon Dioxide 28 20 - 32 mmol/L    Anion Gap 3 3 - 14 mmol/L    Glucose 160 (H) 70 - 99 mg/dL      Comment:      Fasting specimen    Urea Nitrogen 26 7 - 30 mg/dL    Creatinine 0.70 0.52 - 1.04 mg/dL    GFR Estimate >90 >60 mL/min/[1.73_m2]      Comment:      Non  GFR Calc  Starting 12/18/2018, serum creatinine based estimated GFR (eGFR) will be   calculated using the Chronic Kidney Disease Epidemiology Collaboration   (CKD-EPI) equation.      GFR Estimate If Black >90 >60 mL/min/[1.73_m2]      Comment:       GFR Calc  Starting 12/18/2018, serum creatinine based estimated GFR (eGFR) will be   calculated using the Chronic Kidney Disease Epidemiology Collaboration   (CKD-EPI) equation.      Calcium 9.1 8.5 - 10.1 mg/dL   Lipid panel reflex to direct LDL Fasting   Result Value Ref Range    Cholesterol 179 <200 mg/dL    Triglycerides 160 (H) <150 mg/dL      Comment:      Borderline high:  150-199 mg/dl  High:             200-499 mg/dl  Very high:       >499 mg/dl  Fasting specimen      HDL Cholesterol 55 >49 mg/dL    LDL Cholesterol Calculated 92 <100 mg/dL      Comment:      Desirable:       <100 mg/dl    Non HDL Cholesterol 124 <130 mg/dL   Albumin Random Urine Quantitative with Creat Ratio   Result Value Ref Range    Creatinine Urine 85 mg/dL    Albumin Urine mg/L 6 mg/L    Albumin Urine mg/g Cr 7.55 0 -  25 mg/g Cr       If you have any questions or concerns, please call the clinic at the number listed above.       Sincerely,        Marbella Landaverde MD

## 2019-10-21 NOTE — RESULT ENCOUNTER NOTE
Glucose and A1C bit high but at goal, rest of labs are normal.  Please notify.        Thank you. PHILIPPE TRAN MD    
breath sounds clear and equal bilaterally.

## 2019-10-29 DIAGNOSIS — Z00.00 ROUTINE GENERAL MEDICAL EXAMINATION AT A HEALTH CARE FACILITY: ICD-10-CM

## 2019-10-29 PROCEDURE — 82274 ASSAY TEST FOR BLOOD FECAL: CPT | Performed by: FAMILY MEDICINE

## 2019-10-29 NOTE — LETTER
November 4, 2019      Saniya Couch  [address] [facility] [address]        Dear ,    We are writing to inform you of your test results.    FIT test neg for blood. Repeat yearly or get colonoscopy every 10 years to screen for colon cancer. If further questions or problems notify me.    Resulted Orders   Fecal colorectal cancer screen FIT   Result Value Ref Range    Occult Blood Scn FIT Negative NEG^Negative       If you have any questions or concerns, please call the clinic at the number listed above.       Sincerely,        Marbella Landaverde MD

## 2019-11-03 LAB — HEMOCCULT STL QL IA: NEGATIVE

## 2019-11-20 ENCOUNTER — HOSPITAL ENCOUNTER (OUTPATIENT)
Dept: MAMMOGRAPHY | Facility: CLINIC | Age: 59
Discharge: HOME | End: 2019-11-20
Attending: FAMILY MEDICINE | Admitting: FAMILY MEDICINE
Payer: COMMERCIAL

## 2019-11-20 DIAGNOSIS — Z12.31 VISIT FOR SCREENING MAMMOGRAM: ICD-10-CM

## 2019-11-20 PROCEDURE — 77067 SCR MAMMO BI INCL CAD: CPT

## 2019-12-19 ENCOUNTER — TELEPHONE (OUTPATIENT)
Dept: FAMILY MEDICINE | Facility: CLINIC | Age: 59
End: 2019-12-19

## 2019-12-19 NOTE — TELEPHONE ENCOUNTER
Dr. Landaverde, the patient was in clinic 10/21/19, you increased lisinopril to 20mg daily and advised the patient to recheck in 1 month.  She has not had a recheck after increasing to 20mg.  Would you like her to recheck bp before increasing to 40mg?  Thank you,  MITCH Strickland MA

## 2019-12-19 NOTE — TELEPHONE ENCOUNTER
Met in consultation for:  Hypertension    Parameters Addressed:  B/P management    Recommended follow-up:  increase lisinopril, double and recheck pressures in 4 weeks    Considerations:  ACE/ARB lisinopril 40

## 2019-12-19 NOTE — LETTER
January 9, 2020      Saniya Couch  [address] [facility] [address]        Dear Saniya,     Your provider has reviewed your chart and you are due to come in for an office visit to recheck our blood pressure.  You had a medication change when you where in the clinic in October and your provider requested for you to return to the clinic in 1 month to review.      Please contact the clinic to schedule an appointment.  [phone]    Thank you for trusting us with your health care.    Sincerely,        Your [facility] Care Team/lw

## 2019-12-30 ENCOUNTER — ALLIED HEALTH/NURSE VISIT (OUTPATIENT)
Dept: FAMILY MEDICINE | Facility: CLINIC | Age: 59
End: 2019-12-30
Payer: COMMERCIAL

## 2019-12-30 ENCOUNTER — TELEPHONE (OUTPATIENT)
Dept: FAMILY MEDICINE | Facility: CLINIC | Age: 59
End: 2019-12-30

## 2019-12-30 VITALS — DIASTOLIC BLOOD PRESSURE: 80 MMHG | HEART RATE: 72 BPM | OXYGEN SATURATION: 98 % | SYSTOLIC BLOOD PRESSURE: 140 MMHG

## 2019-12-30 DIAGNOSIS — I10 ESSENTIAL HYPERTENSION, BENIGN: Primary | ICD-10-CM

## 2019-12-30 DIAGNOSIS — E11.9 TYPE 2 DIABETES MELLITUS WITHOUT COMPLICATION, WITHOUT LONG-TERM CURRENT USE OF INSULIN (H): ICD-10-CM

## 2019-12-30 PROCEDURE — 99207 ZZC NO CHARGE NURSE ONLY: CPT

## 2019-12-30 RX ORDER — LISINOPRIL 40 MG/1
40 TABLET ORAL DAILY
Qty: 90 TABLET | Refills: 3 | Status: SHIPPED | OUTPATIENT
Start: 2019-12-30 | End: 2020-11-02

## 2019-12-30 NOTE — TELEPHONE ENCOUNTER
Covering for PCP (out of clinic today):  BP improved from before, but still above goal of <130/80.  Recommend increasing lisinopril to 40mg daily and return in 2 weeks for labs and RN BP recheck.    Thanks,  Ravi Bae MD

## 2019-12-30 NOTE — PROGRESS NOTES
Saniya Couch is a 59 year old year old patient who comes in today for a Blood Pressure check because of dose of lisinopril was increased to 20mg daily. She takes in every AM and has not missed any doses.   Vital Signs as repeated by RN:    Vitals:    12/30/19 1517 12/30/19 1522   BP: 132/82 (!) 140/80   BP Location: Left arm Left arm   Patient Position: Sitting Sitting   Cuff Size: Adult Regular Adult Regular   Pulse: 72    SpO2: 98%        Patient is taking medication as prescribed  Patient is tolerating medications well.  Patient is not monitoring Blood Pressure at home.    Current complaints: none.  Reviewed meds.  Disposition:  Routed to provider to review and advise.   Please call pt on her home phone.  Pharmacy of choice is [facility] in [place].    Lily JASSO RN

## 2019-12-30 NOTE — TELEPHONE ENCOUNTER
Saniya Couch is a 59 year old year old patient who comes in today for a Blood Pressure check because of dose of lisinopril was increased to 20mg daily. She takes in every AM and has not missed any doses.   Vital Signs as repeated by RN:    Vitals:    12/30/19 1517 12/30/19 1522   BP: 132/82 (!) 140/80   BP Location: Left arm Left arm   Patient Position: Sitting Sitting   Cuff Size: Adult Regular Adult Regular   Pulse: 72    SpO2: 98%        Patient is taking medication as prescribed  Patient is tolerating medications well.  Patient is not monitoring Blood Pressure at home.    Current complaints: none.  Reviewed meds.  Disposition:  Routed to provider to review and advise.   Please call pt on her home phone.  Pharmacy of choice is [facility] in [place].    Lily JASSO RN

## 2020-01-01 ENCOUNTER — TRANSFERRED RECORDS (OUTPATIENT)
Dept: HEALTH INFORMATION MANAGEMENT | Facility: CLINIC | Age: 60
End: 2020-01-01

## 2020-01-01 LAB — RETINOPATHY: NORMAL

## 2020-02-14 ENCOUNTER — HOSPITAL ENCOUNTER (EMERGENCY)
Facility: CLINIC | Age: 60
Discharge: HOME | End: 2020-02-14
Attending: NURSE PRACTITIONER | Admitting: NURSE PRACTITIONER
Payer: COMMERCIAL

## 2020-02-14 VITALS
WEIGHT: 185 LBS | HEART RATE: 99 BPM | SYSTOLIC BLOOD PRESSURE: 147 MMHG | TEMPERATURE: 101.6 F | OXYGEN SATURATION: 97 % | DIASTOLIC BLOOD PRESSURE: 72 MMHG | BODY MASS INDEX: 31.58 KG/M2 | HEIGHT: 64 IN

## 2020-02-14 DIAGNOSIS — J02.0 STREPTOCOCCAL PHARYNGITIS: ICD-10-CM

## 2020-02-14 LAB
INTERNAL QC OK POCT: YES
S PYO AG THROAT QL IA.RAPID: POSITIVE

## 2020-02-14 PROCEDURE — 87880 STREP A ASSAY W/OPTIC: CPT | Performed by: NURSE PRACTITIONER

## 2020-02-14 PROCEDURE — G0463 HOSPITAL OUTPT CLINIC VISIT: HCPCS | Performed by: NURSE PRACTITIONER

## 2020-02-14 PROCEDURE — 99214 OFFICE O/P EST MOD 30 MIN: CPT | Mod: Z6 | Performed by: NURSE PRACTITIONER

## 2020-02-14 RX ORDER — PENICILLIN V POTASSIUM 500 MG/1
500 TABLET, FILM COATED ORAL 3 TIMES DAILY
Qty: 30 TABLET | Refills: 0 | Status: SHIPPED | OUTPATIENT
Start: 2020-02-14 | End: 2020-03-16

## 2020-02-14 ASSESSMENT — ENCOUNTER SYMPTOMS
HEADACHES: 0
SHORTNESS OF BREATH: 0
FACIAL SWELLING: 0
ABDOMINAL PAIN: 0
ARTHRALGIAS: 0
STRIDOR: 0
APPETITE CHANGE: 0
DIZZINESS: 0
SORE THROAT: 1
DYSURIA: 0
ACTIVITY CHANGE: 0
EYE REDNESS: 0
CONFUSION: 0
FATIGUE: 0
WOUND: 0
WHEEZING: 0
NECK STIFFNESS: 0
SINUS PAIN: 0
FEVER: 0
DIFFICULTY URINATING: 0
SEIZURES: 0
WEAKNESS: 0
CHILLS: 1
COUGH: 1
COLOR CHANGE: 0

## 2020-02-14 ASSESSMENT — MIFFLIN-ST. JEOR: SCORE: 1384.15

## 2020-02-14 NOTE — ED AVS SNAPSHOT
[facility]  [address] [facility] [address]  Phone:  [phone]  Fax:  [phone]                                    Saniya Couch   MRN: [id number]    Department:  [facility]   Date of Visit:  2/14/2020           After Visit Summary Signature Page    I have received my discharge instructions, and my questions have been answered. I have discussed any challenges I see with this plan with the nurse or doctor.    ..........................................................................................................................................  Patient/Patient Representative Signature      ..........................................................................................................................................  Patient Representative Print Name and Relationship to Patient    ..................................................               ................................................  Date                                   Time    ..........................................................................................................................................  Reviewed by Signature/Title    ...................................................              ..............................................  Date                                               Time          22EPIC Rev 08/18

## 2020-02-15 NOTE — ED PROVIDER NOTES
History     Chief Complaint   Patient presents with     Pharyngitis     HPI    SUBJECTIVE: Saniya Couch  is here today because of:Fever and Sore Throat  The patient has had symptoms of fever, sore throat, headache, myalgias, decreased appetite, decreased activity, fatigue and chills, nausea.   Onset of symptoms was 1 day ago. Course of illness is worsening.  Patient admits to exposure to illness at home with daughter with strep pharyngitis.   Patient denies earache, chest congestion, wheezing, mattering conjuntivitis  Treatment measures tried include acetaminophen.  Patient is not a smoker    Allergies:  No Known Allergies    Problem List:    Patient Active Problem List    Diagnosis Date Noted     Special screening for malignant neoplasms, colon 10/20/2017     Priority: Medium     Type 2 diabetes mellitus without complication (H) 2016     Priority: Medium     Non morbid obesity due to excess calories 2016     Priority: Medium     ideal weight 120, goal weight 180 by 12       Essential hypertension, benign 2013     Priority: Medium     Family history of diabetes mellitus 2012     Priority: Medium     GERD (gastroesophageal reflux disease) 2010     Priority: Medium     Hyperlipidemia LDL goal <130 2010     Priority: Medium        Past Medical History:    Past Medical History:   Diagnosis Date     Acid reflux      ASCUS favor benign 2007     Helicobacter pylori      Hyperglycemia      Hyperlipidemia      Obesity, unspecified      Ulcer        Past Surgical History:    Past Surgical History:   Procedure Laterality Date      SECTION        GALLBLADDER SURGERY         Family History:    Family History   Problem Relation Age of Onset     Hypertension Mother      Dementia Mother      Heart Disease Father         heart disease     Thyroid Disease Daughter         thyroid disease     Diabetes Brother      Diabetes Brother      Diabetes Brother      Diabetes  "Brother        Social History:  Marital Status:   [2]  Social History     Tobacco Use     Smoking status: Never Smoker     Smokeless tobacco: Never Used   Substance Use Topics     Alcohol use: No     Drug use: No        Medications:    penicillin V (VEETID) 500 MG tablet  atorvastatin (LIPITOR) 40 MG tablet  blood glucose monitoring (ACCU-CHEK FASTCLIX) lancets  blood glucose monitoring (NO BRAND SPECIFIED) test strip  lisinopril (PRINIVIL/ZESTRIL) 40 MG tablet  metFORMIN (GLUCOPHAGE) 500 MG tablet  order for DME      Review of Systems   Constitutional: Positive for chills. Negative for activity change, appetite change, fatigue and fever.   HENT: Positive for congestion and sore throat. Negative for ear pain, facial swelling and sinus pain.    Eyes: Negative for redness and visual disturbance.   Respiratory: Positive for cough. Negative for shortness of breath, wheezing and stridor.    Cardiovascular: Negative for chest pain.   Gastrointestinal: Negative for abdominal pain.   Genitourinary: Negative for difficulty urinating and dysuria.   Musculoskeletal: Negative for arthralgias and neck stiffness.   Skin: Negative for color change, rash and wound.   Neurological: Negative for dizziness, seizures, weakness and headaches.   Psychiatric/Behavioral: Negative for confusion and self-injury.   All other systems reviewed and are negative.      Physical Exam   BP: (!) 147/72  Pulse: 99  Temp: 101.6  F (38.7  C)  Height: 162.6 cm (5' 4\")  Weight: 83.9 kg (185 lb)  SpO2: 97 %      Physical Exam  Vitals signs and nursing note reviewed.   Constitutional:       General: She is not in acute distress.     Appearance: She is well-developed. She is not diaphoretic.   HENT:      Head: Normocephalic and atraumatic.      Jaw: No trismus.      Right Ear: Hearing, tympanic membrane, ear canal and external ear normal.      Left Ear: Hearing, tympanic membrane, ear canal and external ear normal.      Nose: Nose normal. No mucosal " edema or rhinorrhea.      Right Sinus: No maxillary sinus tenderness or frontal sinus tenderness.      Left Sinus: No maxillary sinus tenderness or frontal sinus tenderness.      Mouth/Throat:      Pharynx: Uvula midline. Posterior oropharyngeal erythema present. No oropharyngeal exudate or uvula swelling.      Tonsils: No tonsillar exudate or tonsillar abscesses. 0 on the right. 0 on the left.   Eyes:      General: No scleral icterus.        Right eye: No discharge.         Left eye: No discharge.      Conjunctiva/sclera: Conjunctivae normal.   Neck:      Musculoskeletal: Normal range of motion and neck supple.   Cardiovascular:      Rate and Rhythm: Normal rate and regular rhythm.      Heart sounds: Normal heart sounds.   Pulmonary:      Effort: Pulmonary effort is normal. No respiratory distress.      Breath sounds: Normal breath sounds. No stridor. No wheezing or rales.   Musculoskeletal:         General: No tenderness.   Lymphadenopathy:      Cervical: Cervical adenopathy present.   Skin:     General: Skin is warm.      Findings: No erythema or rash.   Neurological:      Mental Status: She is alert and oriented to person, place, and time.         ED Course        Procedures    Results for orders placed or performed during the hospital encounter of 02/14/20 (from the past 24 hour(s))   Rapid strep group A screen POCT   Result Value Ref Range    Rapid Strep A Screen Positive (A) neg    Internal QC OK Yes        Medications - No data to display    Assessments & Plan (with Medical Decision Making)     I have reviewed the nursing notes.    I have reviewed the findings, diagnosis, plan and need for follow up with the patient.  Medical Decision Making:  CXR is not indicated.  Rapid Strep test is indicated.     Assessment:  1) Strep pharyngitis.    PLAN:  Penicillin V 500 mg PO TID x 10 days  Use acetaminophen, increase fluids and rest.   Follow up with any questions or problems    New Prescriptions    PENICILLIN V  (VEETID) 500 MG TABLET    Take 1 tablet (500 mg) by mouth 3 times daily for 10 days       Final diagnoses:   Streptococcal pharyngitis       2/14/2020   [facility] EMERGENCY DEPARTMENT     Luna Salas, KATE JONES  02/14/20 [id number]

## 2020-03-16 ENCOUNTER — ALLIED HEALTH/NURSE VISIT (OUTPATIENT)
Dept: FAMILY MEDICINE | Facility: CLINIC | Age: 60
End: 2020-03-16
Payer: COMMERCIAL

## 2020-03-16 VITALS — DIASTOLIC BLOOD PRESSURE: 74 MMHG | HEART RATE: 80 BPM | SYSTOLIC BLOOD PRESSURE: 130 MMHG

## 2020-03-16 DIAGNOSIS — I10 ESSENTIAL HYPERTENSION, BENIGN: ICD-10-CM

## 2020-03-16 DIAGNOSIS — I10 ESSENTIAL HYPERTENSION, BENIGN: Primary | ICD-10-CM

## 2020-03-16 LAB
ANION GAP SERPL CALCULATED.3IONS-SCNC: 3 MMOL/L (ref 3–14)
BUN SERPL-MCNC: 20 MG/DL (ref 7–30)
CALCIUM SERPL-MCNC: 9 MG/DL (ref 8.5–10.1)
CHLORIDE SERPL-SCNC: 106 MMOL/L (ref 94–109)
CO2 SERPL-SCNC: 27 MMOL/L (ref 20–32)
CREAT SERPL-MCNC: 0.68 MG/DL (ref 0.52–1.04)
GFR SERPL CREATININE-BSD FRML MDRD: >90 ML/MIN/{1.73_M2}
GLUCOSE SERPL-MCNC: 173 MG/DL (ref 70–99)
POTASSIUM SERPL-SCNC: 4.2 MMOL/L (ref 3.4–5.3)
SODIUM SERPL-SCNC: 136 MMOL/L (ref 133–144)

## 2020-03-16 PROCEDURE — 36415 COLL VENOUS BLD VENIPUNCTURE: CPT | Performed by: INTERNAL MEDICINE

## 2020-03-16 PROCEDURE — 80048 BASIC METABOLIC PNL TOTAL CA: CPT | Performed by: INTERNAL MEDICINE

## 2020-03-16 PROCEDURE — 99207 ZZC NO CHARGE NURSE ONLY: CPT

## 2020-03-16 NOTE — NURSING NOTE
Saniya Couch is a 60 year old year old patient who comes in today for a Blood Pressure check because of ongoing blood pressure monitoring.    Vital Signs as repeated by RN:  130/74 pulse of 80    Patient is taking medication as prescribed  Patient is tolerating medications well.  Patient is not monitoring Blood Pressure at home.    Current complaints: none  Disposition:  Pt is at goal today.    Also, pt was getting labs updated today.    Loida Miller RN

## 2020-07-05 NOTE — PROGRESS NOTES

## 2020-11-02 ENCOUNTER — OFFICE VISIT (OUTPATIENT)
Dept: FAMILY MEDICINE | Facility: CLINIC | Age: 60
End: 2020-11-02
Payer: COMMERCIAL

## 2020-11-02 VITALS
OXYGEN SATURATION: 98 % | BODY MASS INDEX: 33.22 KG/M2 | DIASTOLIC BLOOD PRESSURE: 82 MMHG | RESPIRATION RATE: 18 BRPM | HEART RATE: 80 BPM | SYSTOLIC BLOOD PRESSURE: 139 MMHG | HEIGHT: 63 IN | TEMPERATURE: 98.2 F | WEIGHT: 187.5 LBS

## 2020-11-02 DIAGNOSIS — Z12.11 COLON CANCER SCREENING: ICD-10-CM

## 2020-11-02 DIAGNOSIS — E11.9 TYPE 2 DIABETES MELLITUS WITHOUT COMPLICATION, WITHOUT LONG-TERM CURRENT USE OF INSULIN (H): ICD-10-CM

## 2020-11-02 DIAGNOSIS — E78.5 HYPERLIPIDEMIA LDL GOAL <100: ICD-10-CM

## 2020-11-02 DIAGNOSIS — I10 BENIGN ESSENTIAL HYPERTENSION: ICD-10-CM

## 2020-11-02 DIAGNOSIS — Z23 NEED FOR PROPHYLACTIC VACCINATION AND INOCULATION AGAINST INFLUENZA: ICD-10-CM

## 2020-11-02 DIAGNOSIS — Z00.00 ROUTINE GENERAL MEDICAL EXAMINATION AT A HEALTH CARE FACILITY: Primary | ICD-10-CM

## 2020-11-02 LAB
CHOLEST SERPL-MCNC: 184 MG/DL
CREAT UR-MCNC: 151 MG/DL
HBA1C MFR BLD: 9.5 % (ref 0–5.6)
HDLC SERPL-MCNC: 59 MG/DL
LDLC SERPL CALC-MCNC: 102 MG/DL
MICROALBUMIN UR-MCNC: 17 MG/L
MICROALBUMIN/CREAT UR: 11.39 MG/G CR (ref 0–25)
NONHDLC SERPL-MCNC: 125 MG/DL
TRIGL SERPL-MCNC: 117 MG/DL

## 2020-11-02 PROCEDURE — 83036 HEMOGLOBIN GLYCOSYLATED A1C: CPT | Performed by: FAMILY MEDICINE

## 2020-11-02 PROCEDURE — 90471 IMMUNIZATION ADMIN: CPT | Performed by: FAMILY MEDICINE

## 2020-11-02 PROCEDURE — 99396 PREV VISIT EST AGE 40-64: CPT | Mod: 25 | Performed by: FAMILY MEDICINE

## 2020-11-02 PROCEDURE — 82043 UR ALBUMIN QUANTITATIVE: CPT | Performed by: FAMILY MEDICINE

## 2020-11-02 PROCEDURE — 99214 OFFICE O/P EST MOD 30 MIN: CPT | Mod: 25 | Performed by: FAMILY MEDICINE

## 2020-11-02 PROCEDURE — 80061 LIPID PANEL: CPT | Performed by: FAMILY MEDICINE

## 2020-11-02 PROCEDURE — 90682 RIV4 VACC RECOMBINANT DNA IM: CPT | Performed by: FAMILY MEDICINE

## 2020-11-02 PROCEDURE — 82274 ASSAY TEST FOR BLOOD FECAL: CPT | Performed by: FAMILY MEDICINE

## 2020-11-02 PROCEDURE — 36415 COLL VENOUS BLD VENIPUNCTURE: CPT | Performed by: FAMILY MEDICINE

## 2020-11-02 RX ORDER — LISINOPRIL 40 MG/1
40 TABLET ORAL DAILY
Qty: 90 TABLET | Refills: 3 | Status: SHIPPED | OUTPATIENT
Start: 2020-11-02 | End: 2021-11-16

## 2020-11-02 RX ORDER — ATORVASTATIN CALCIUM 40 MG/1
40 TABLET, FILM COATED ORAL DAILY
Qty: 90 TABLET | Refills: 3 | Status: SHIPPED | OUTPATIENT
Start: 2020-11-02 | End: 2021-11-08

## 2020-11-02 ASSESSMENT — MIFFLIN-ST. JEOR: SCORE: 1385.74

## 2020-11-02 NOTE — LETTER
"November 9, 2020      Saniya Couch  [address] [facility] [address]        Dear ,        We are writing to inform you of your test results.    \"Please inform patient that test result was within normal parameters.   Thank you.     Conrado Millan M.D.\"       Resulted Orders   Fecal colorectal cancer screen (FIT)   Result Value Ref Range    Occult Blood Scn FIT Negative NEG^Negative       If you have any questions or concerns, please call the clinic at the number listed above.       Sincerely,        [facility]                "

## 2020-11-02 NOTE — PROGRESS NOTES
SUBJECTIVE:   CC: Saniya Couch is an 60 year old woman who presents for preventive health visit.     Patient is a 60 yr old female here for her annual physical. She is also requesting refills on her chronic medication,. She denies any acute symptoms today. She is also due for her diabetes recheck. She reports that she has been monitoring her sugars and they have been okay. She denies any side effects to her medication.      {Split Bill scripting  The purpose of this visit is to discuss your medical history and prevent health problems before you are sick. You may be responsible for a co-pay, coinsurance, or deductible if your visit today includes services such as checking on a sore throat, having an x-ray or lab test, or treating and evaluating a new or existing condition   Patient has been advised of split billing requirements and indicates understanding: Yes  Healthy Habits:    Do you get at least three servings of calcium containing foods daily (dairy, green leafy vegetables, etc.)? yes    Amount of exercise or daily activities, outside of work: 2 hour(s) per day    Problems taking medications regularly No    Medication side effects: No    Have you had an eye exam in the past two years? yes    Do you see a dentist twice per year? yes    Do you have sleep apnea, excessive snoring or daytime drowsiness?no      Diabetes Follow-up    How often are you checking your blood sugar? A few times a month  What time of day are you checking your blood sugars (select all that apply)?  Before meals and After meals  Have you had any blood sugars above 200?  No  Have you had any blood sugars below 70?  No    What symptoms do you notice when your blood sugar is low?  Shaky    What concerns do you have today about your diabetes? None     Do you have any of these symptoms? (Select all that apply)  No numbness or tingling in feet.  No redness, sores or blisters on feet.  No complaints of excessive thirst.  No reports of blurry  vision.  No significant changes to weight.    Have you had a diabetic eye exam in the last 12 months? Yes- Date of last eye exam: [facility],  Location: 2020          Hyperlipidemia Follow-Up      Are you regularly taking any medication or supplement to lower your cholesterol?   No    Are you having muscle aches or other side effects that you think could be caused by your cholesterol lowering medication?  No    Hypertension Follow-up      Do you check your blood pressure regularly outside of the clinic? Yes     Are you following a low salt diet? No    Are your blood pressures ever more than 140 on the top number (systolic) OR more   than 90 on the bottom number (diastolic), for example 140/90? No    BP Readings from Last 2 Encounters:   11/02/20 139/82   03/16/20 130/74     Hemoglobin A1C (%)   Date Value   11/02/2020 9.5 (H)   10/21/2019 6.7 (H)     LDL Cholesterol Calculated (mg/dL)   Date Value   11/02/2020 102 (H)   10/21/2019 92         Today's PHQ-2 Score:   PHQ-2 ( 1999 [employer]) 11/2/2020 10/21/2019   Q1: Little interest or pleasure in doing things 0 0   Q2: Feeling down, depressed or hopeless 0 0   PHQ-2 Score 0 0       Abuse: Current or Past(Physical, Sexual or Emotional)- No  Do you feel safe in your environment? Yes    Have you ever done Advance Care Planning? (For example, a Health Directive, POLST, or a discussion with a medical provider or your loved ones about your wishes): Yes, advance care planning is on file.    Social History     Tobacco Use     Smoking status: Never Smoker     Smokeless tobacco: Never Used   Substance Use Topics     Alcohol use: No     If you drink alcohol do you typically have >3 drinks per day or >7 drinks per week? Not Applicable                     Reviewed orders with patient.  Reviewed health maintenance and updated orders accordingly - Yes  Lab work is in process  Labs reviewed in EPIC  BP Readings from Last 3 Encounters:   11/02/20 139/82   03/16/20 130/74   02/14/20  (!) 147/72    Wt Readings from Last 3 Encounters:   20 85 kg (187 lb 8 oz)   20 83.9 kg (185 lb)   10/21/19 82.2 kg (181 lb 3.2 oz)                  Patient Active Problem List   Diagnosis     GERD (gastroesophageal reflux disease)     Hyperlipidemia LDL goal <130     Family history of diabetes mellitus     Essential hypertension, benign     Non morbid obesity due to excess calories     Type 2 diabetes mellitus without complication (H)     Special screening for malignant neoplasms, colon     Past Surgical History:   Procedure Laterality Date      SECTION        GALLBLADDER SURGERY         Social History     Tobacco Use     Smoking status: Never Smoker     Smokeless tobacco: Never Used   Substance Use Topics     Alcohol use: No     Family History   Problem Relation Age of Onset     Hypertension Mother      Dementia Mother      Heart Disease Father         heart disease     Thyroid Disease Daughter         thyroid disease     Diabetes Brother      Diabetes Brother      Diabetes Brother      Diabetes Brother          Current Outpatient Medications   Medication Sig Dispense Refill     atorvastatin (LIPITOR) 40 MG tablet Take 1 tablet (40 mg) by mouth daily 90 tablet 3     blood glucose monitoring (ACCU-CHEK FASTCLIX) lancets 1 each daily Use to test blood sugar 1 times daily or as directed. 102 each 11     blood glucose monitoring (NO BRAND SPECIFIED) test strip Use to test blood sugars 1 times daily or as directed. One Touch Ultra blue, one touch verio. 100 strip 4     lisinopril (ZESTRIL) 40 MG tablet Take 1 tablet (40 mg) by mouth daily 90 tablet 3     metFORMIN (GLUCOPHAGE) 500 MG tablet Take 1 tablet (500 mg) by mouth daily (with dinner) 90 tablet 3     No Known Allergies  Recent Labs   Lab Test 20  1359 20  0912 10/21/19  0803 10/16/18  0803 17  0758 17  0758   A1C 9.5*  --  6.7* 6.6*   < > 8.2*   *  --  92 151*  --  121*   HDL 59  --  55 52  --  72   TRIG  117  --  160* 155*  --  56   ALT  --   --   --   --   --  138*   CR  --  0.68 0.70 0.70  --  0.80   GFRESTIMATED  --  >90 >90 86  --  73   GFRESTBLACK  --  >90 >90 >90  --  89   POTASSIUM  --  4.2 4.0 3.9  --  3.8   TSH  --   --   --  2.93  --  1.85    < > = values in this interval not displayed.        Mammogram Screening: Patient over age 50, mutual decision to screen reflected in health maintenance.    Pertinent mammograms are reviewed under the imaging tab.  History of abnormal Pap smear: NO - age 30- 65 PAP every 3 years recommended  PAP / HPV Latest Ref Rng & Units 10/16/2018 2016 2011   PAP - NIL NIL NIL   HPV 16 DNA NEG:Negative Negative - -   HPV 18 DNA NEG:Negative Negative - -   OTHER HR HPV NEG:Negative Negative - -     Reviewed and updated as needed this visit by clinical staff  Tobacco  Allergies  Meds              Reviewed and updated as needed this visit by Provider    Meds             Past Medical History:   Diagnosis Date     Acid reflux      ASCUS favor benign 2007    negative HPV     Helicobacter pylori      Hyperglycemia      Hyperlipidemia      Obesity, unspecified      Ulcer     h-pylori      Past Surgical History:   Procedure Laterality Date      SECTION        GALLBLADDER SURGERY       OB History    Para Term  AB Living   5 3 0 0 0 3   SAB TAB Ectopic Multiple Live Births   0 0 0 0 0      # Outcome Date GA Lbr Hair/2nd Weight Sex Delivery Anes PTL Lv   5 Para 09/15/99     CS-Unspec      4 Para 97           3 Para 84           2             1                 ROS:  CONSTITUTIONAL: NEGATIVE for fever, chills, change in weight  INTEGUMENTARY/SKIN: NEGATIVE for worrisome rashes, moles or lesions  EYES: NEGATIVE for vision changes or irritation  ENT: NEGATIVE for ear, mouth and throat problems  RESP: NEGATIVE for significant cough or SOB  CV: NEGATIVE for chest pain, palpitations or peripheral edema  GI: NEGATIVE  "for nausea, abdominal pain, heartburn, or change in bowel habits  : NEGATIVE for unusual urinary or vaginal symptoms. No vaginal bleeding.  MUSCULOSKELETAL: NEGATIVE for significant arthralgias or myalgia  NEURO: NEGATIVE for weakness, dizziness or paresthesias  PSYCHIATRIC: NEGATIVE for changes in mood or affect     OBJECTIVE:   /82   Pulse 80   Temp 98.2  F (36.8  C) (Tympanic)   Resp 18   Ht 1.61 m (5' 3.39\")   Wt 85 kg (187 lb 8 oz)   SpO2 98%   BMI 32.81 kg/m    EXAM:  GENERAL: healthy, alert and no distress  EYES: Eyes grossly normal to inspection, PERRL and conjunctivae and sclerae normal  HENT: ear canals and TM's normal, nose and mouth without ulcers or lesions  NECK: no adenopathy, no asymmetry, masses, or scars and thyroid normal to palpation  RESP: lungs clear to auscultation - no rales, rhonchi or wheezes  CV: regular rate and rhythm, normal S1 S2, no S3 or S4, no murmur, click or rub, no peripheral edema and peripheral pulses strong  ABDOMEN: soft, nontender, no hepatosplenomegaly, no masses and bowel sounds normal  MS: no gross musculoskeletal defects noted, no edema  SKIN: no suspicious lesions or rashes  PSYCH: mentation appears normal, affect normal/bright    Diagnostic Test Results:  Results for orders placed or performed in visit on 11/02/20 (from the past 24 hour(s))   Albumin Random Urine Quantitative with Creat Ratio   Result Value Ref Range    Creatinine Urine 151 mg/dL    Albumin Urine mg/L 17 mg/L    Albumin Urine mg/g Cr 11.39 0 - 25 mg/g Cr   Hemoglobin A1c   Result Value Ref Range    Hemoglobin A1C 9.5 (H) 0 - 5.6 %   Lipid panel reflex to direct LDL Fasting   Result Value Ref Range    Cholesterol 184 <200 mg/dL    Triglycerides 117 <150 mg/dL    HDL Cholesterol 59 >49 mg/dL    LDL Cholesterol Calculated 102 (H) <100 mg/dL    Non HDL Cholesterol 125 <130 mg/dL       ASSESSMENT/PLAN:   Saniya was seen today for physical and imm/inj.    Diagnoses and all orders for this " "visit:    Saniya was seen today for physical and imm/inj.    Diagnoses and all orders for this visit:    Routine general medical examination at a health care facility       60 yr old female here for her annual physical. Also requesting refills on her chronic medication. FIT screen ordered. Patient reminded of mammogram. Labs ordered.     Type 2 diabetes mellitus without complication, without long-term current use of insulin (H)  -     Albumin Random Urine Quantitative with Creat Ratio  -     Hemoglobin A1c  -     metFORMIN (GLUCOPHAGE) 500 MG tablet; Take 1 tablet (500 mg) by mouth daily (with dinner)  -     OFFICE/OUTPT VISIT,EST,LEVL IV  -     Patient will be notified of results.    Hyperlipidemia LDL goal <100  -     Lipid panel reflex to direct LDL Fasting  -     OFFICE/OUTPT VISIT,EST,LEVL IV            -  Patient will be notified of results.    Colon cancer screening  -     Fecal colorectal cancer screen (FIT); Future    Benign essential hypertension  -     lisinopril (ZESTRIL) 40 MG tablet; Take 1 tablet (40 mg) by mouth daily  -     OFFICE/OUTPT VISIT,EST,LEVL IV    Need for prophylactic vaccination and inoculation against influenza  -     INFLUENZA QUAD, RECOMBINANT, P-FREE (RIV4) (FLUBLOCK) [91013]    Other orders  -     atorvastatin (LIPITOR) 40 MG tablet; Take 1 tablet (40 mg) by mouth daily            Patient has been advised of split billing requirements and indicates understanding: Yes  COUNSELING:   Reviewed preventive health counseling, as reflected in patient instructions       Regular exercise       Healthy diet/nutrition       Vision screening    Estimated body mass index is 32.81 kg/m  as calculated from the following:    Height as of this encounter: 1.61 m (5' 3.39\").    Weight as of this encounter: 85 kg (187 lb 8 oz).    Weight management plan: Discussed healthy diet and exercise guidelines    She reports that she has never smoked. She has never used smokeless tobacco.      Counseling " Resources:  ATP IV Guidelines  Pooled Cohorts Equation Calculator  Breast Cancer Risk Calculator  BRCA-Related Cancer Risk Assessment: FHS-7 Tool  FRAX Risk Assessment  ICSI Preventive Guidelines  Dietary Guidelines for Americans, 2010  USDA's MyPlate  ASA Prophylaxis  Lung CA Screening    Conrado Millan MD  [facility]

## 2020-11-02 NOTE — LETTER
November 10, 2020      Saniya Mccoyenson  [address] [facility] [address]        Dear ,    We are writing to inform you of your test results.We have been unable to reach you by phone.       Please inform patient that test result was within normal parameters except that her diabetes was much worse than the last picture. Her A1C was 9.5 as compared to 6.7. That means her blood sugars are averaging in the 300s. I will like her to increase her metformin to 2 in the morning and 1 at night) I will fax more to the pharmacy for her ) . She is asked to be aggressive with her diet. She is to come back in three months for a recheck of her diabetes.   Thank you.      Conrado Millan M.D.     Resulted Orders   Albumin Random Urine Quantitative with Creat Ratio   Result Value Ref Range    Creatinine Urine 151 mg/dL    Albumin Urine mg/L 17 mg/L    Albumin Urine mg/g Cr 11.39 0 - 25 mg/g Cr   Hemoglobin A1c   Result Value Ref Range    Hemoglobin A1C 9.5 (H) 0 - 5.6 %      Comment:      Normal <5.7% Prediabetes 5.7-6.4%  Diabetes 6.5% or higher - adopted from ADA   consensus guidelines.     Lipid panel reflex to direct LDL Fasting   Result Value Ref Range    Cholesterol 184 <200 mg/dL    Triglycerides 117 <150 mg/dL      Comment:      Fasting specimen    HDL Cholesterol 59 >49 mg/dL    LDL Cholesterol Calculated 102 (H) <100 mg/dL      Comment:      Above desirable:  100-129 mg/dl  Borderline High:  130-159 mg/dL  High:             160-189 mg/dL  Very high:       >189 mg/dl      Non HDL Cholesterol 125 <130 mg/dL       If you have any questions or concerns, please call the clinic at the number listed above.       Sincerely,        Conrado Millan MD

## 2020-11-03 DIAGNOSIS — E11.9 TYPE 2 DIABETES MELLITUS WITHOUT COMPLICATION, WITHOUT LONG-TERM CURRENT USE OF INSULIN (H): Primary | ICD-10-CM

## 2020-11-03 NOTE — RESULT ENCOUNTER NOTE
Please inform patient that test result was within normal parameters except that her diabetes was much worse than the last picture. Her A1C was 9.5 as compared to 6.7. That means her blood sugars are averaging in the 300s. I will like her to increase her metformin to 2 in the morning and 1 at night) I will fax more to the pharmacy for her ) . She is asked to be aggressive with her diet. She is to come back in three months for a recheck of her diabetes.   Thank you.     Conrado Millan M.D.

## 2020-11-07 LAB — HEMOCCULT STL QL IA: NEGATIVE

## 2020-11-09 NOTE — RESULT ENCOUNTER NOTE
Please inform patient that test result was within normal parameters.   Thank you.     Conrado Millan M.D.

## 2020-11-16 ENCOUNTER — TELEPHONE (OUTPATIENT)
Dept: FAMILY MEDICINE | Facility: CLINIC | Age: 60
End: 2020-11-16

## 2020-11-16 DIAGNOSIS — E11.9 TYPE 2 DIABETES MELLITUS WITHOUT COMPLICATION, WITHOUT LONG-TERM CURRENT USE OF INSULIN (H): ICD-10-CM

## 2020-11-16 NOTE — TELEPHONE ENCOUNTER
Reason for Call:  Metformin    Detailed comments: patient is calling and stating that on 11/2/2020, she was seen by Dr. Millan, and she was to reorder all her medicatons. She did, but Dr. Millan was going to change her Metformin to 1 tablet 3 times a day. It is still at 1 tablet once a day. Please  Send the correct Rx and advise the patient.    Phone Number Patient can be reached at: Home number on file [phone] (home)    Best Time:any    Can we leave a detailed message on this number? YES   Yamileth Hsu  [facility]        Call taken on 11/16/2020 at 12:35 PM by Yamileth Ken

## 2020-11-16 NOTE — TELEPHONE ENCOUNTER
Dr. Millan,    Patient states you were going to change the metformin Rx.  Pended for your consideration. Cornelia MCPHERSON RN

## 2021-05-25 ENCOUNTER — RECORDS - HEALTHEAST (OUTPATIENT)
Dept: ADMINISTRATIVE | Facility: CLINIC | Age: 61
End: 2021-05-25

## 2021-05-26 ENCOUNTER — RECORDS - HEALTHEAST (OUTPATIENT)
Dept: ADMINISTRATIVE | Facility: CLINIC | Age: 61
End: 2021-05-26

## 2021-05-27 ENCOUNTER — RECORDS - HEALTHEAST (OUTPATIENT)
Dept: ADMINISTRATIVE | Facility: CLINIC | Age: 61
End: 2021-05-27

## 2021-05-28 ENCOUNTER — RECORDS - HEALTHEAST (OUTPATIENT)
Dept: ADMINISTRATIVE | Facility: CLINIC | Age: 61
End: 2021-05-28

## 2021-07-13 ENCOUNTER — RECORDS - HEALTHEAST (OUTPATIENT)
Dept: ADMINISTRATIVE | Facility: CLINIC | Age: 61
End: 2021-07-13

## 2021-07-21 ENCOUNTER — RECORDS - HEALTHEAST (OUTPATIENT)
Dept: ADMINISTRATIVE | Facility: CLINIC | Age: 61
End: 2021-07-21

## 2021-11-04 DIAGNOSIS — E78.5 HYPERLIPIDEMIA LDL GOAL <100: Primary | ICD-10-CM

## 2021-11-04 NOTE — TELEPHONE ENCOUNTER
Pending Prescriptions:                       Disp   Refills    atorvastatin (LIPITOR) 40 MG tablet [Phar*90 tab*3            Sig: TAKE 1 TABLET(40 MG) BY MOUTH DAILY    Routing refill request to provider for review/approval because:  Labs not current:    LDL Cholesterol Calculated   Date Value Ref Range Status   11/02/2020 102 (H) <100 mg/dL Final     Comment:     Above desirable:  100-129 mg/dl  Borderline High:  130-159 mg/dL  High:             160-189 mg/dL  Very high:       >189 mg/dl         Patient needs to be seen because it has been more than 1 year since last office visit.      Erik Whyte RN

## 2021-11-08 RX ORDER — ATORVASTATIN CALCIUM 40 MG/1
TABLET, FILM COATED ORAL
Qty: 90 TABLET | Refills: 3 | Status: SHIPPED | OUTPATIENT
Start: 2021-11-08 | End: 2021-11-16

## 2021-11-16 ENCOUNTER — OFFICE VISIT (OUTPATIENT)
Dept: FAMILY MEDICINE | Facility: CLINIC | Age: 61
End: 2021-11-16
Payer: COMMERCIAL

## 2021-11-16 VITALS
TEMPERATURE: 100.4 F | HEART RATE: 86 BPM | OXYGEN SATURATION: 95 % | SYSTOLIC BLOOD PRESSURE: 128 MMHG | DIASTOLIC BLOOD PRESSURE: 68 MMHG | BODY MASS INDEX: 30.9 KG/M2 | WEIGHT: 181 LBS | HEIGHT: 64 IN

## 2021-11-16 DIAGNOSIS — Z12.12 SCREENING FOR MALIGNANT NEOPLASM OF THE RECTUM: ICD-10-CM

## 2021-11-16 DIAGNOSIS — E78.5 HYPERLIPIDEMIA LDL GOAL <100: ICD-10-CM

## 2021-11-16 DIAGNOSIS — E11.9 TYPE 2 DIABETES MELLITUS WITHOUT COMPLICATION, WITHOUT LONG-TERM CURRENT USE OF INSULIN (H): ICD-10-CM

## 2021-11-16 DIAGNOSIS — Z23 NEED FOR PROPHYLACTIC VACCINATION AND INOCULATION AGAINST INFLUENZA: ICD-10-CM

## 2021-11-16 DIAGNOSIS — I10 BENIGN ESSENTIAL HYPERTENSION: ICD-10-CM

## 2021-11-16 DIAGNOSIS — Z00.00 ROUTINE GENERAL MEDICAL EXAMINATION AT A HEALTH CARE FACILITY: Primary | ICD-10-CM

## 2021-11-16 LAB
CHOLEST SERPL-MCNC: 171 MG/DL
CREAT UR-MCNC: 169 MG/DL
FASTING STATUS PATIENT QL REPORTED: YES
HBA1C MFR BLD: 7.5 % (ref 0–5.6)
HDLC SERPL-MCNC: 55 MG/DL
LDLC SERPL CALC-MCNC: 91 MG/DL
MICROALBUMIN UR-MCNC: 27 MG/L
MICROALBUMIN/CREAT UR: 15.98 MG/G CR (ref 0–25)
NONHDLC SERPL-MCNC: 116 MG/DL
TRIGL SERPL-MCNC: 123 MG/DL

## 2021-11-16 PROCEDURE — 90471 IMMUNIZATION ADMIN: CPT | Performed by: FAMILY MEDICINE

## 2021-11-16 PROCEDURE — 82043 UR ALBUMIN QUANTITATIVE: CPT | Performed by: FAMILY MEDICINE

## 2021-11-16 PROCEDURE — 90682 RIV4 VACC RECOMBINANT DNA IM: CPT | Performed by: FAMILY MEDICINE

## 2021-11-16 PROCEDURE — 99396 PREV VISIT EST AGE 40-64: CPT | Mod: 25 | Performed by: FAMILY MEDICINE

## 2021-11-16 PROCEDURE — 83036 HEMOGLOBIN GLYCOSYLATED A1C: CPT | Performed by: FAMILY MEDICINE

## 2021-11-16 PROCEDURE — 99214 OFFICE O/P EST MOD 30 MIN: CPT | Mod: 25 | Performed by: FAMILY MEDICINE

## 2021-11-16 PROCEDURE — 80061 LIPID PANEL: CPT | Performed by: FAMILY MEDICINE

## 2021-11-16 PROCEDURE — 36415 COLL VENOUS BLD VENIPUNCTURE: CPT | Performed by: FAMILY MEDICINE

## 2021-11-16 RX ORDER — LISINOPRIL 40 MG/1
40 TABLET ORAL DAILY
Qty: 90 TABLET | Refills: 3 | Status: SHIPPED | OUTPATIENT
Start: 2021-11-16 | End: 2022-11-16

## 2021-11-16 RX ORDER — ATORVASTATIN CALCIUM 40 MG/1
40 TABLET, FILM COATED ORAL DAILY
Qty: 90 TABLET | Refills: 3 | Status: SHIPPED | OUTPATIENT
Start: 2021-11-16 | End: 2022-11-07

## 2021-11-16 ASSESSMENT — ENCOUNTER SYMPTOMS
COUGH: 1
FREQUENCY: 1
MYALGIAS: 0
ABDOMINAL PAIN: 0
FEVER: 0
EYE PAIN: 0
NAUSEA: 0
PALPITATIONS: 0
HEADACHES: 0
DIARRHEA: 0
SORE THROAT: 0
BREAST MASS: 0
PARESTHESIAS: 0
SHORTNESS OF BREATH: 0
WEAKNESS: 0
NERVOUS/ANXIOUS: 0
HEARTBURN: 0
HEMATOCHEZIA: 0
DIZZINESS: 0
DYSURIA: 0
CHILLS: 0
JOINT SWELLING: 1
HEMATURIA: 0
CONSTIPATION: 0
ARTHRALGIAS: 0

## 2021-11-16 ASSESSMENT — MIFFLIN-ST. JEOR: SCORE: 1353.07

## 2021-11-16 NOTE — LETTER
November 16, 2021      Saniya Couch  [address] [facility] [address]        Dear ,    We are writing to inform you of your test results.    Your test results fall within the expected range(s) or remain unchanged from previous results.  Please continue with current treatment plan.    Resulted Orders   **A1C FUTURE 3mo   Result Value Ref Range    Hemoglobin A1C 7.5 (H) 0.0 - 5.6 %      Comment:      Normal <5.7%   Prediabetes 5.7-6.4%    Diabetes 6.5% or higher     Note: Adopted from ADA consensus guidelines.   Lipid panel reflex to direct LDL Fasting   Result Value Ref Range    Cholesterol 171 <200 mg/dL    Triglycerides 123 <150 mg/dL    Direct Measure HDL 55 >=50 mg/dL    LDL Cholesterol Calculated 91 <=100 mg/dL    Non HDL Cholesterol 116 <130 mg/dL    Patient Fasting > 8hrs? Yes     Narrative    Cholesterol  Desirable:  <200 mg/dL    Triglycerides  Normal:  Less than 150 mg/dL  Borderline High:  150-199 mg/dL  High:  200-499 mg/dL  Very High:  Greater than or equal to 500 mg/dL    Direct Measure HDL  Female:  Greater than or equal to 50 mg/dL   Male:  Greater than or equal to 40 mg/dL    LDL Cholesterol  Desirable:  <100mg/dL  Above Desirable:  100-129 mg/dL   Borderline High:  130-159 mg/dL   High:  160-189 mg/dL   Very High:  >= 190 mg/dL    Non HDL Cholesterol  Desirable:  130 mg/dL  Above Desirable:  130-159 mg/dL  Borderline High:  160-189 mg/dL  High:  190-219 mg/dL  Very High:  Greater than or equal to 220 mg/dL   Albumin Random Urine Quantitative with Creat Ratio   Result Value Ref Range    Creatinine Urine mg/dL 169 mg/dL    Albumin Urine mg/L 27 mg/L    Albumin Urine mg/g Cr 15.98 0.00 - 25.00 mg/g Cr       If you have any questions or concerns, please call the clinic at the number listed above.       Sincerely,      Conrado Millan MD

## 2021-11-16 NOTE — PATIENT INSTRUCTIONS
Preventive Health Recommendations  Female Ages 50 - 64    Yearly exam: See your health care provider every year in order to  o Review health changes.   o Discuss preventive care.    o Review your medicines if your doctor has prescribed any.      Get a Pap test every three years (unless you have an abnormal result and your provider advises testing more often).    If you get Pap tests with HPV test, you only need to test every 5 years, unless you have an abnormal result.     You do not need a Pap test if your uterus was removed (hysterectomy) and you have not had cancer.    You should be tested each year for STDs (sexually transmitted diseases) if you're at risk.     Have a mammogram every 1 to 2 years.    Have a colonoscopy at age 50, or have a yearly FIT test (stool test). These exams screen for colon cancer.      Have a cholesterol test every 5 years, or more often if advised.    Have a diabetes test (fasting glucose) every three years. If you are at risk for diabetes, you should have this test more often.     If you are at risk for osteoporosis (brittle bone disease), think about having a bone density scan (DEXA).    Shots: Get a flu shot each year. Get a tetanus shot every 10 years.    Nutrition:     Eat at least 5 servings of fruits and vegetables each day.    Eat whole-grain bread, whole-wheat pasta and brown rice instead of white grains and rice.    Get adequate Calcium and Vitamin D.     Lifestyle    Exercise at least 150 minutes a week (30 minutes a day, 5 days a week). This will help you control your weight and prevent disease.    Limit alcohol to one drink per day.    No smoking.     Wear sunscreen to prevent skin cancer.     See your dentist every six months for an exam and cleaning.    See your eye doctor every 1 to 2 years.            Thank you for choosing [facility].  You may be receiving an email and/or telephone survey request from [facility] [employer] regarding your visit today.   Please take a few minutes to respond to the survey to let us know how we are doing.      If you have questions or concerns, please contact us via HTP or you can contact your care team at [phone] option [phone].    Our Clinic hours are:  Monday - Thursday 7am-6pm  Friday 7am-5pm    The [place] outpatient lab hours are:  Monday - Friday 7am-4:30pm    Appointments are required, call [phone]    If you have clinical questions after hours or would like to schedule an appointment,  call the clinic at [phone].

## 2021-11-16 NOTE — PROGRESS NOTES
SUBJECTIVE:   CC: Saniya Couch is an 61 year old woman who presents for preventive health visit.       61 yr old female here for her annual physical and recheck of diabetes and cholesterol medication refills. She has been doing well and denies any side effects to her medications. She does not check her blood sugars too often. She has had no hypoglycemic symptoms.    Patient has been advised of split billing requirements and indicates understanding: Yes  Healthy Habits:     Getting at least 3 servings of Calcium per day:  Yes    Bi-annual eye exam:  NO    Dental care twice a year:  Yes    Sleep apnea or symptoms of sleep apnea:  None    Diet:  Diabetic    Frequency of exercise:  None    Taking medications regularly:  Yes    Medication side effects:  None    PHQ-2 Total Score: 0    Additional concerns today:  Yes    Chief Complaint   Patient presents with     Physical     General physical exam.     Diabetes     Recheck on diabetes.     Lipids     Recheck on lipid medication.     Hypertension     Recheck on blood pressure.     Cough     States she has been having a dry cough and head cold symptoms.  This started this past weekend.  Her  was ill first.  His co-worker tested negative for Covid.  Has been taking Mucinex-DM.     Imm/Inj     Flu Shot           Diabetes Follow-up    How often are you checking your blood sugar? Once per month or if she is not feeling right-due to needing refills of the test strips.  Will check more often when having refills.  What time of day are you checking your blood sugars (select all that apply)?  Before meals-AM  Have you had any blood sugars above 200?  No  Has been around 130-160 at times.  Have you had any blood sugars below 70?  No    What symptoms do you notice when your blood sugar is low?  Dizzy    What concerns do you have today about your diabetes? None     Do you have any of these symptoms? (Select all that apply)  Burning in feet-right foot, near the great toe  are.  Not sure if gout.    Have you had a diabetic eye exam in the last 12 months? No          Hyperlipidemia Follow-Up      Are you regularly taking any medication or supplement to lower your cholesterol?   Yes- Atorvastatin    Are you having muscle aches or other side effects that you think could be caused by your cholesterol lowering medication?  No    Hypertension Follow-up      Do you check your blood pressure regularly outside of the clinic? Once in awhile she will check at the store.  Not recently.      Are you following a low salt diet? Yes-tries to.    Are your blood pressures ever more than 140 on the top number (systolic) OR more   than 90 on the bottom number (diastolic), for example 140/90? Not checking.    BP Readings from Last 2 Encounters:   11/16/21 128/68   11/02/20 139/82     Hemoglobin A1C (%)   Date Value   11/16/2021 7.5 (H)   11/02/2020 9.5 (H)   10/21/2019 6.7 (H)     LDL Cholesterol Calculated (mg/dL)   Date Value   11/02/2020 102 (H)   10/21/2019 92         Today's PHQ-2 Score:   PHQ-2 ( 1999 Pfizer) 11/16/2021   Q1: Little interest or pleasure in doing things 0   Q2: Feeling down, depressed or hopeless 0   PHQ-2 Score 0   Q1: Little interest or pleasure in doing things Not at all   Q2: Feeling down, depressed or hopeless Not at all   PHQ-2 Score 0       Abuse: Current or Past (Physical, Sexual or Emotional) - No  Do you feel safe in your environment? Yes        Social History     Tobacco Use     Smoking status: Never Smoker     Smokeless tobacco: Never Used   Substance Use Topics     Alcohol use: No     If you drink alcohol do you typically have >3 drinks per day or >7 drinks per week? No    Alcohol Use 11/16/2021   Prescreen: >3 drinks/day or >7 drinks/week? No   Prescreen: >3 drinks/day or >7 drinks/week? -       Reviewed orders with patient.  Reviewed health maintenance and updated orders accordingly - Yes  Lab work is in process  BP Readings from Last 3 Encounters:   11/16/21 128/68    20 139/82   20 130/74    Wt Readings from Last 3 Encounters:   21 82.1 kg (181 lb)   20 85 kg (187 lb 8 oz)   20 83.9 kg (185 lb)                  Patient Active Problem List   Diagnosis     GERD (gastroesophageal reflux disease)     Hyperlipidemia LDL goal <130     Family history of diabetes mellitus     Essential hypertension, benign     Non morbid obesity due to excess calories     Type 2 diabetes mellitus without complication (H)     Special screening for malignant neoplasms, colon     Past Surgical History:   Procedure Laterality Date      SECTION        GALLBLADDER SURGERY         Social History     Tobacco Use     Smoking status: Never Smoker     Smokeless tobacco: Never Used   Substance Use Topics     Alcohol use: No     Family History   Problem Relation Age of Onset     Hypertension Mother      Dementia Mother      Heart Disease Father         heart disease     Thyroid Disease Daughter         thyroid disease     Diabetes Brother      Diabetes Brother      Diabetes Brother      Diabetes Brother          Current Outpatient Medications   Medication Sig Dispense Refill     atorvastatin (LIPITOR) 40 MG tablet Take 1 tablet (40 mg) by mouth daily 90 tablet 3     blood glucose (NO BRAND SPECIFIED) test strip test blood sugar 3 times daily or as directed. 100 strip 3     blood glucose monitoring (ACCU-CHEK FASTCLIX) lancets 1 each daily Use to test blood sugar 1 times daily or as directed. 102 each 11     blood glucose monitoring (NO BRAND SPECIFIED) test strip Use to test blood sugars 1 times daily or as directed. One Touch Ultra blue, one touch verio. 100 strip 4     lisinopril (ZESTRIL) 40 MG tablet Take 1 tablet (40 mg) by mouth daily 90 tablet 3     metFORMIN (GLUCOPHAGE) 500 MG tablet Take 1 tablet (500 mg) by mouth 2 times daily (with meals) 180 tablet 3     No Known Allergies  Recent Labs   Lab Test 21  0747 20  1359 20  0912 10/21/19  0803  10/16/18  0803 17  0809 17  0758   A1C 7.5* 9.5*  --  6.7* 6.6*   < > 8.2*   LDL  --  102*  --  92 151*  --  121*   HDL  --  59  --  55 52  --  72   TRIG  --  117  --  160* 155*  --  56   ALT  --   --   --   --   --   --  138*   CR  --   --  0.68 0.70 0.70  --  0.80   GFRESTIMATED  --   --  >90 >90 86  --  73   GFRESTBLACK  --   --  >90 >90 >90  --  89   POTASSIUM  --   --  4.2 4.0 3.9  --  3.8   TSH  --   --   --   --  2.93  --  1.85    < > = values in this interval not displayed.        Breast Cancer Screening:    Breast CA Risk Assessment (FHS-7) 2021   Do you have a family history of breast, colon, or ovarian cancer? No / Unknown         Mammogram Screening: Recommended mammography every 1-2 years with patient discussion and risk factor consideration  Pertinent mammograms are reviewed under the imaging tab.    History of abnormal Pap smear: NO - age 30- 65 PAP every 3 years recommended  PAP / HPV Latest Ref Rng & Units 10/16/2018 2016 2011   PAP (Historical) - NIL NIL NIL   HPV16 NEG:Negative Negative - -   HPV18 NEG:Negative Negative - -   HRHPV NEG:Negative Negative - -     Reviewed and updated as needed this visit by clinical staff  Tobacco  Allergies  Meds  Problems  Med Hx  Surg Hx  Fam Hx  Soc Hx         Reviewed and updated as needed this visit by Provider    Meds  Problems           Past Medical History:   Diagnosis Date     Acid reflux      ASCUS favor benign 2007    negative HPV     Helicobacter pylori      Hyperglycemia      Hyperlipidemia      Obesity, unspecified      Ulcer     h-pylori      Past Surgical History:   Procedure Laterality Date      SECTION        GALLBLADDER SURGERY         Review of Systems   Constitutional: Negative for chills and fever.   HENT: Negative for congestion, ear pain, hearing loss and sore throat.    Eyes: Negative for pain and visual disturbance.   Respiratory: Positive for cough. Negative for shortness of  "breath.    Cardiovascular: Negative for chest pain, palpitations and peripheral edema.   Gastrointestinal: Negative for abdominal pain, constipation, diarrhea, heartburn, hematochezia and nausea.   Breasts:  Negative for tenderness, breast mass and discharge.   Genitourinary: Positive for frequency. Negative for dysuria, genital sores, hematuria, pelvic pain, urgency, vaginal bleeding and vaginal discharge.   Musculoskeletal: Positive for joint swelling. Negative for arthralgias and myalgias.   Skin: Negative for rash.   Neurological: Negative for dizziness, weakness, headaches and paresthesias.   Psychiatric/Behavioral: Negative for mood changes. The patient is not nervous/anxious.           OBJECTIVE:   /68   Pulse 86   Temp 100.4  F (38  C) (Tympanic)   Ht 1.613 m (5' 3.5\")   Wt 82.1 kg (181 lb)   SpO2 95%   BMI 31.56 kg/m    Physical Exam  GENERAL: healthy, alert and no distress  EYES: Eyes grossly normal to inspection, PERRL and conjunctivae and sclerae normal  HENT: ear canals and TM's normal, nose and mouth without ulcers or lesions  NECK: no adenopathy, no asymmetry, masses, or scars and thyroid normal to palpation  RESP: lungs clear to auscultation - no rales, rhonchi or wheezes  BREAST: normal without masses, tenderness or nipple discharge and no palpable axillary masses or adenopathy  CV: regular rate and rhythm, normal S1 S2, no S3 or S4, no murmur, click or rub, no peripheral edema and peripheral pulses strong  ABDOMEN: soft, nontender, no hepatosplenomegaly, no masses and bowel sounds normal  MS: no gross musculoskeletal defects noted, no edema  SKIN: no suspicious lesions or rashes  PSYCH: mentation appears normal, affect normal/bright  Diabetic foot exam: normal DP and PT pulses, no trophic changes or ulcerative lesions and normal sensory exam    Diagnostic Test Results:  Results for orders placed or performed in visit on 11/16/21 (from the past 24 hour(s))   **A1C FUTURE 3mo   Result " "Value Ref Range    Hemoglobin A1C 7.5 (H) 0.0 - 5.6 %       ASSESSMENT/PLAN:   (Z00.00) Routine general medical examination at a health care facility  (primary encounter diagnosis)  Comment: Patient is a 61 yr old female here for her annual physical. She is doing well overall. Reminded of pap.   Plan: Continue with healthy lifestyle.     (E11.9) Type 2 diabetes mellitus without complication, without long-term current use of insulin (H)  Comment: A1C much better. Continue with medication as is.   Plan: A1C FUTURE 3mo, Albumin Random Urine         Quantitative with Creat Ratio, blood glucose         (NO BRAND SPECIFIED) test strip, metFORMIN         (GLUCOPHAGE) 500 MG tablet, OFFICE/OUTPT         VISIT,EST,LEVL IV          (Z12.12) Screening for malignant neoplasm of the rectum  Comment: Patient will be notified of results.   Plan: Fecal colorectal cancer screen (FIT)            (I10) Benign essential hypertension  Comment: Blood pressure is within fair range. Medication refilled.   Plan: lisinopril (ZESTRIL) 40 MG tablet, OFFICE/OUTPT        VISIT,EST,LEVL IV      (E78.5) Hyperlipidemia LDL goal <100  Comment: medication faxed.  Plan: atorvastatin (LIPITOR) 40 MG tablet, Lipid         panel reflex to direct LDL Fasting,         OFFICE/OUTPT VISIT,EST,LEVL IV            (Z23) Need for prophylactic vaccination and inoculation against influenza  Comment: flu shot updated.   Plan: INFLUENZA QUAD, RECOMBINANT, P-FREE (RIV4)         (FLUBLOK), OFFICE/OUTPT VISIT,EST,LEVL IV              Patient has been advised of split billing requirements and indicates understanding: Yes  COUNSELING:  Reviewed preventive health counseling, as reflected in patient instructions       Regular exercise       Healthy diet/nutrition       Vision screening    Estimated body mass index is 31.56 kg/m  as calculated from the following:    Height as of this encounter: 1.613 m (5' 3.5\").    Weight as of this encounter: 82.1 kg (181 lb).    Weight " management plan: Discussed healthy diet and exercise guidelines    She reports that she has never smoked. She has never used smokeless tobacco.      Counseling Resources:  ATP IV Guidelines  Pooled Cohorts Equation Calculator  Breast Cancer Risk Calculator  BRCA-Related Cancer Risk Assessment: FHS-7 Tool  FRAX Risk Assessment  ICSI Preventive Guidelines  Dietary Guidelines for Americans, 2010  USDA's MyPlate  ASA Prophylaxis  Lung CA Screening    Conrado Millan MD  [facility]

## 2021-11-16 NOTE — RESULT ENCOUNTER NOTE
Please inform patient that her test results were within normal parameters.   Thank you.     Conrado Millan M.D.

## 2021-12-05 ENCOUNTER — LAB (OUTPATIENT)
Dept: LAB | Facility: CLINIC | Age: 61
End: 2021-12-05
Payer: COMMERCIAL

## 2021-12-05 DIAGNOSIS — Z12.12 SCREENING FOR MALIGNANT NEOPLASM OF THE RECTUM: ICD-10-CM

## 2021-12-05 PROCEDURE — 82274 ASSAY TEST FOR BLOOD FECAL: CPT

## 2021-12-05 NOTE — LETTER
December 8, 2021      Saniya CAITLIN Khao  [address] [facility] [address]        Dear ,    We are writing to inform you of your test results.    Your test results fall within the expected range(s) or remain unchanged from previous results.  Please continue with current treatment plan.    Resulted Orders   Fecal colorectal cancer screen (FIT)   Result Value Ref Range    Occult Blood Screen FIT Negative Negative       If you have any questions or concerns, please call the clinic at the number listed above.       Sincerely,      Conrado Millan MD

## 2021-12-07 LAB — HEMOCCULT STL QL IA: NEGATIVE

## 2022-05-11 ENCOUNTER — HOSPITAL ENCOUNTER (OUTPATIENT)
Dept: MAMMOGRAPHY | Facility: CLINIC | Age: 62
Discharge: HOME | End: 2022-05-11
Attending: FAMILY MEDICINE | Admitting: FAMILY MEDICINE
Payer: COMMERCIAL

## 2022-05-11 DIAGNOSIS — Z12.31 VISIT FOR SCREENING MAMMOGRAM: ICD-10-CM

## 2022-05-11 PROCEDURE — 77067 SCR MAMMO BI INCL CAD: CPT

## 2022-07-03 ENCOUNTER — HEALTH MAINTENANCE LETTER (OUTPATIENT)
Age: 62
End: 2022-07-03

## 2022-11-16 ENCOUNTER — OFFICE VISIT (OUTPATIENT)
Dept: FAMILY MEDICINE | Facility: CLINIC | Age: 62
End: 2022-11-16
Payer: COMMERCIAL

## 2022-11-16 VITALS
WEIGHT: 176 LBS | RESPIRATION RATE: 16 BRPM | DIASTOLIC BLOOD PRESSURE: 74 MMHG | BODY MASS INDEX: 30.05 KG/M2 | TEMPERATURE: 98 F | HEART RATE: 68 BPM | SYSTOLIC BLOOD PRESSURE: 136 MMHG | HEIGHT: 64 IN | OXYGEN SATURATION: 98 %

## 2022-11-16 DIAGNOSIS — E78.5 HYPERLIPIDEMIA LDL GOAL <100: ICD-10-CM

## 2022-11-16 DIAGNOSIS — I10 BENIGN ESSENTIAL HYPERTENSION: ICD-10-CM

## 2022-11-16 DIAGNOSIS — Z00.00 ROUTINE GENERAL MEDICAL EXAMINATION AT A HEALTH CARE FACILITY: Primary | ICD-10-CM

## 2022-11-16 DIAGNOSIS — E11.9 TYPE 2 DIABETES MELLITUS WITHOUT COMPLICATION, WITHOUT LONG-TERM CURRENT USE OF INSULIN (H): ICD-10-CM

## 2022-11-16 DIAGNOSIS — Z12.11 SPECIAL SCREENING FOR MALIGNANT NEOPLASMS, COLON: ICD-10-CM

## 2022-11-16 LAB
ANION GAP SERPL CALCULATED.3IONS-SCNC: 6 MMOL/L (ref 7–15)
BUN SERPL-MCNC: 23.2 MG/DL (ref 8–23)
CALCIUM SERPL-MCNC: 9.5 MG/DL (ref 8.8–10.2)
CHLORIDE SERPL-SCNC: 105 MMOL/L (ref 98–107)
CHOLEST SERPL-MCNC: 189 MG/DL
CREAT SERPL-MCNC: 0.84 MG/DL (ref 0.51–0.95)
CREAT UR-MCNC: 113.8 MG/DL
DEPRECATED HCO3 PLAS-SCNC: 28 MMOL/L (ref 22–29)
GFR SERPL CREATININE-BSD FRML MDRD: 77 ML/MIN/1.73M2
GLUCOSE SERPL-MCNC: 158 MG/DL (ref 70–99)
HBA1C MFR BLD: 7.4 % (ref 0–5.6)
HDLC SERPL-MCNC: 47 MG/DL
LDLC SERPL CALC-MCNC: 107 MG/DL
MICROALBUMIN UR-MCNC: <12 MG/L
MICROALBUMIN/CREAT UR: NORMAL MG/G{CREAT}
NONHDLC SERPL-MCNC: 142 MG/DL
POTASSIUM SERPL-SCNC: 4.2 MMOL/L (ref 3.4–5.3)
SODIUM SERPL-SCNC: 139 MMOL/L (ref 136–145)
TRIGL SERPL-MCNC: 175 MG/DL

## 2022-11-16 PROCEDURE — 80048 BASIC METABOLIC PNL TOTAL CA: CPT | Performed by: FAMILY MEDICINE

## 2022-11-16 PROCEDURE — 80061 LIPID PANEL: CPT | Performed by: FAMILY MEDICINE

## 2022-11-16 PROCEDURE — 99396 PREV VISIT EST AGE 40-64: CPT | Mod: 25 | Performed by: FAMILY MEDICINE

## 2022-11-16 PROCEDURE — 99214 OFFICE O/P EST MOD 30 MIN: CPT | Mod: 25 | Performed by: FAMILY MEDICINE

## 2022-11-16 PROCEDURE — 90471 IMMUNIZATION ADMIN: CPT | Performed by: FAMILY MEDICINE

## 2022-11-16 PROCEDURE — 36415 COLL VENOUS BLD VENIPUNCTURE: CPT | Performed by: FAMILY MEDICINE

## 2022-11-16 PROCEDURE — 90682 RIV4 VACC RECOMBINANT DNA IM: CPT | Performed by: FAMILY MEDICINE

## 2022-11-16 PROCEDURE — 83036 HEMOGLOBIN GLYCOSYLATED A1C: CPT | Performed by: FAMILY MEDICINE

## 2022-11-16 PROCEDURE — 90472 IMMUNIZATION ADMIN EACH ADD: CPT | Performed by: FAMILY MEDICINE

## 2022-11-16 PROCEDURE — 82043 UR ALBUMIN QUANTITATIVE: CPT | Performed by: FAMILY MEDICINE

## 2022-11-16 PROCEDURE — 90715 TDAP VACCINE 7 YRS/> IM: CPT | Performed by: FAMILY MEDICINE

## 2022-11-16 RX ORDER — GLIPIZIDE 2.5 MG/1
2.5 TABLET, EXTENDED RELEASE ORAL DAILY
Qty: 90 TABLET | Refills: 3 | Status: SHIPPED | OUTPATIENT
Start: 2022-11-16 | End: 2023-08-08

## 2022-11-16 RX ORDER — LISINOPRIL 40 MG/1
40 TABLET ORAL DAILY
Qty: 90 TABLET | Refills: 3 | Status: SHIPPED | OUTPATIENT
Start: 2022-11-16 | End: 2023-11-01

## 2022-11-16 ASSESSMENT — ENCOUNTER SYMPTOMS
NERVOUS/ANXIOUS: 0
CONSTIPATION: 0
ARTHRALGIAS: 0
FREQUENCY: 1
SHORTNESS OF BREATH: 0
PARESTHESIAS: 0
MYALGIAS: 0
NAUSEA: 0
EYE PAIN: 0
JOINT SWELLING: 0
FEVER: 0
DIARRHEA: 0
HEMATURIA: 0
PALPITATIONS: 0
ABDOMINAL PAIN: 0
HEARTBURN: 0
DIZZINESS: 0
COUGH: 1
SORE THROAT: 0
HEADACHES: 0
WEAKNESS: 0
HEMATOCHEZIA: 0
DYSURIA: 0
CHILLS: 0

## 2022-11-16 ASSESSMENT — PAIN SCALES - GENERAL: PAINLEVEL: NO PAIN (0)

## 2022-11-16 NOTE — NURSING NOTE
Prior to immunization administration, verified patients identity using patient s name and date of birth. Please see Immunization Activity for additional information.     Screening Questionnaire for Adult Immunization    Are you sick today?   No   Do you have allergies to medications, food, a vaccine component or latex?   No   Have you ever had a serious reaction after receiving a vaccination?   No   Do you have a long-term health problem with heart, lung, kidney, or metabolic disease (e.g., diabetes), asthma, a blood disorder, no spleen, complement component deficiency, a cochlear implant, or a spinal fluid leak?  Are you on long-term aspirin therapy?   No   Do you have cancer, leukemia, HIV/AIDS, or any other immune system problem?   No   Do you have a parent, brother, or sister with an immune system problem?   No   In the past 3 months, have you taken medications that affect  your immune system, such as prednisone, other steroids, or anticancer drugs; drugs for the treatment of rheumatoid arthritis, Crohn s disease, or psoriasis; or have you had radiation treatments?   No   Have you had a seizure, or a brain or other nervous system problem?   No   During the past year, have you received a transfusion of blood or blood    products, or been given immune (gamma) globulin or antiviral drug?   No   For women: Are you pregnant or is there a chance you could become       pregnant during the next month?   No   Have you received any vaccinations in the past 4 weeks?   No     Immunization questionnaire answers were all negative.        Per orders of Dr. Millan, injection of Adacel given by Leora Levin CMA. Patient instructed to remain in clinic for 15 minutes afterwards, and to report any adverse reaction to me immediately.       Screening performed by Leora Levin CMA on 11/16/2022 at 11:58 AM.

## 2022-11-16 NOTE — PROGRESS NOTES
SUBJECTIVE:   CC: Saniya is an 62 year old who presents for preventive health visit.       Patient is a 62-year-old female with past medical history significant for hypertension hyperlipidemia type 2 diabetes she is here for medication refills and recheck of her chronic medical conditions.  A1c was 7.4 which was just slightly better than the last A1c.  Recommend adding on glipizide to medication she is open to this.  Labs obtained today.  She has no side effects on medications thus far.      Medications were faxed to the pharmacy.  Preventive health was discussed.  She has had colonoscopy in the last 10 years.  Mammogram was done earlier this year.  She is open to getting her flu shot updated today.      {Split Bill scripting  The purpose of this visit is to discuss your medical history and prevent health problems before you are sick. You may be responsible for a co-pay, coinsurance, or deductible if your visit today includes services such as checking on a sore throat, having an x-ray or lab test, or treating and evaluating a new or existing condition   Patient has been advised of split billing requirements and indicates understanding: Yes  Healthy Habits:     Getting at least 3 servings of Calcium per day:  Yes    Bi-annual eye exam:  NO    Dental care twice a year:  Yes    Sleep apnea or symptoms of sleep apnea:  None    Diet:  Diabetic    Frequency of exercise:  1 day/week    Duration of exercise:  Less than 15 minutes    Taking medications regularly:  Yes    Medication side effects:  None    PHQ-2 Total Score: 0    Additional concerns today:  No    Chief Complaint   Patient presents with     Physical     General physical exam.     Hypertension     Recheck on blood pressure.     Lipids     Recheck on lipids.     Diabetes     Recheck on diabetes.     Imm/Inj     Due to update her Tetanus injection and Flu shot today.  She states she has had both shingles vaccines updated through her Pharmacy.     Work in Field  Maintenance     Will do the FIT test.  This can be done after 12-5-22.       Diabetes Follow-up    How often are you checking your blood sugar? At least once per week.  What time of day are you checking your blood sugars (select all that apply)?  Before meals  Have you had any blood sugars above 200?  No, last reading was 145.  Have you had any blood sugars below 70?  No    What symptoms do you notice when your blood sugar is low?  Dizzy, that was awhile back.  No recent symptoms.    What concerns do you have today about your diabetes? None     Do you have any of these symptoms? (Select all that apply)  Weight loss    Have you had a diabetic eye exam in the last 12 months? No          Hyperlipidemia Follow-Up      Are you regularly taking any medication or supplement to lower your cholesterol?   Yes- Atorvastatin    Are you having muscle aches or other side effects that you think could be caused by your cholesterol lowering medication?  No    Hypertension Follow-up      Do you check your blood pressure regularly outside of the clinic? No     Are you following a low salt diet? Yes    Are your blood pressures ever more than 140 on the top number (systolic) OR more   than 90 on the bottom number (diastolic), for example 140/90? Not checking.    BP Readings from Last 2 Encounters:   11/16/22 136/74   11/16/21 128/68     Hemoglobin A1C (%)   Date Value   11/16/2022 7.4 (H)   11/16/2021 7.5 (H)   11/02/2020 9.5 (H)   10/21/2019 6.7 (H)     LDL Cholesterol Calculated (mg/dL)   Date Value   11/16/2021 91   11/02/2020 102 (H)   10/21/2019 92         Today's PHQ-2 Score:   PHQ-2 ( 1999 Pfizer) 11/16/2022   Q1: Little interest or pleasure in doing things 0   Q2: Feeling down, depressed or hopeless 0   PHQ-2 Score 0   PHQ-2 Total Score (12-17 Years)- Positive if 3 or more points; Administer PHQ-A if positive -   Q1: Little interest or pleasure in doing things Not at all   Q2: Feeling down, depressed or hopeless Not at all    PHQ-2 Score 0           Social History     Tobacco Use     Smoking status: Never     Smokeless tobacco: Never   Substance Use Topics     Alcohol use: No     If you drink alcohol do you typically have >3 drinks per day or >7 drinks per week? No    Alcohol Use 2022   Prescreen: >3 drinks/day or >7 drinks/week? No   Prescreen: >3 drinks/day or >7 drinks/week? -       Reviewed orders with patient.  Reviewed health maintenance and updated orders accordingly - Yes  Lab work is in process  Labs reviewed in EPIC  BP Readings from Last 3 Encounters:   22 136/74   21 128/68   20 139/82    Wt Readings from Last 3 Encounters:   22 79.8 kg (176 lb)   21 82.1 kg (181 lb)   20 85 kg (187 lb 8 oz)                  Patient Active Problem List   Diagnosis     GERD (gastroesophageal reflux disease)     Hyperlipidemia LDL goal <130     Family history of diabetes mellitus     Essential hypertension, benign     Non morbid obesity due to excess calories     Type 2 diabetes mellitus without complication (H)     Special screening for malignant neoplasms, colon     Past Surgical History:   Procedure Laterality Date      SECTION  1999     GALLBLADDER SURGERY  2005       Social History     Tobacco Use     Smoking status: Never     Smokeless tobacco: Never   Substance Use Topics     Alcohol use: No     Family History   Problem Relation Age of Onset     Hypertension Mother      Dementia Mother      Heart Disease Father         heart disease     Thyroid Disease Daughter         thyroid disease     Diabetes Brother      Diabetes Brother      Diabetes Brother      Diabetes Brother          Current Outpatient Medications   Medication Sig Dispense Refill     atorvastatin (LIPITOR) 40 MG tablet TAKE 1 TABLET(40 MG) BY MOUTH DAILY 90 tablet 0     blood glucose (NO BRAND SPECIFIED) test strip test blood sugar 3 times daily or as directed. 100 strip 3     blood glucose monitoring (ACCU-CHEK  FASTCLIX) lancets 1 each daily Use to test blood sugar 1 times daily or as directed. 102 each 11     blood glucose monitoring (NO BRAND SPECIFIED) test strip Use to test blood sugars 1 times daily or as directed. One Touch Ultra blue, one touch verio. 100 strip 4     glipiZIDE (GLUCOTROL XL) 2.5 MG 24 hr tablet Take 1 tablet (2.5 mg) by mouth daily 90 tablet 3     lisinopril (ZESTRIL) 40 MG tablet Take 1 tablet (40 mg) by mouth daily 90 tablet 3     metFORMIN (GLUCOPHAGE) 500 MG tablet Take 1 tablet (500 mg) by mouth 2 times daily (with meals) 180 tablet 3     No Known Allergies  Recent Labs   Lab Test 11/16/22  0904 11/16/21  0844 11/16/21  0747 11/02/20  1359 03/16/20  0912 10/21/19  0803 10/16/18  0803 06/01/17  0809 03/09/17  0758   A1C 7.4*  --  7.5* 9.5*  --  6.7* 6.6*   < > 8.2*   LDL  --  91  --  102*  --  92 151*  --  121*   HDL  --  55  --  59  --  55 52  --  72   TRIG  --  123  --  117  --  160* 155*  --  56   ALT  --   --   --   --   --   --   --   --  138*   CR  --   --   --   --  0.68 0.70 0.70  --  0.80   GFRESTIMATED  --   --   --   --  >90 >90 86  --  73   GFRESTBLACK  --   --   --   --  >90 >90 >90  --  89   POTASSIUM  --   --   --   --  4.2 4.0 3.9  --  3.8   TSH  --   --   --   --   --   --  2.93  --  1.85    < > = values in this interval not displayed.        Breast Cancer Screening:    Breast CA Risk Assessment (FHS-7) 11/16/2021   Do you have a family history of breast, colon, or ovarian cancer? No / Unknown         Mammogram Screening: Recommended mammography every 1-2 years with patient discussion and risk factor consideration  Pertinent mammograms are reviewed under the imaging tab.    History of abnormal Pap smear: NO - age 30- 65 PAP every 3 years recommended  PAP / HPV Latest Ref Rng & Units 10/16/2018 2/18/2016 8/29/2011   PAP (Historical) - NIL NIL NIL   HPV16 NEG:Negative Negative - -   HPV18 NEG:Negative Negative - -   HRHPV NEG:Negative Negative - -     Reviewed and updated as  "needed this visit by clinical staff   Tobacco  Allergies  Meds  Problems             Reviewed and updated as needed this visit by Provider     Meds  Problems            Past Medical History:   Diagnosis Date     Acid reflux      ASCUS favor benign 2007    negative HPV     Helicobacter pylori      Hyperglycemia      Hyperlipidemia      Obesity, unspecified      Ulcer 2005    h-pylori      Past Surgical History:   Procedure Laterality Date      SECTION  1999     GALLBLADDER SURGERY  2005     OB History    Para Term  AB Living   5 3 0 0 0 3   SAB IAB Ectopic Multiple Live Births   0 0 0 0 0      # Outcome Date GA Lbr Hair/2nd Weight Sex Delivery Anes PTL Lv   5 Para 09/15/99     CS-Unspec      4 Para 97           3 Para 84           2             1                 Review of Systems   Constitutional: Negative for chills and fever.   HENT: Negative for congestion, ear pain, hearing loss and sore throat.    Eyes: Negative for pain and visual disturbance.   Respiratory: Positive for cough. Negative for shortness of breath.    Cardiovascular: Negative for chest pain, palpitations and peripheral edema.   Gastrointestinal: Negative for abdominal pain, constipation, diarrhea, heartburn, hematochezia and nausea.   Genitourinary: Positive for frequency. Negative for dysuria, genital sores, hematuria and urgency.   Musculoskeletal: Negative for arthralgias, joint swelling and myalgias.   Skin: Negative for rash.   Neurological: Negative for dizziness, weakness, headaches and paresthesias.   Psychiatric/Behavioral: Negative for mood changes. The patient is not nervous/anxious.           OBJECTIVE:   /74 (BP Location: Left arm, Patient Position: Chair, Cuff Size: Adult Large)   Pulse 68   Temp 98  F (36.7  C) (Tympanic)   Resp 16   Ht 1.613 m (5' 3.5\")   Wt 79.8 kg (176 lb)   SpO2 98%   BMI 30.69 kg/m    Physical Exam  GENERAL: " healthy, alert and no distress  EYES: Eyes grossly normal to inspection, PERRL and conjunctivae and sclerae normal  HENT: ear canals and TM's normal, nose and mouth without ulcers or lesions  NECK: no adenopathy, no asymmetry, masses, or scars and thyroid normal to palpation  RESP: lungs clear to auscultation - no rales, rhonchi or wheezes  CV: regular rate and rhythm, normal S1 S2, no S3 or S4, no murmur, click or rub, no peripheral edema and peripheral pulses strong  ABDOMEN: soft, nontender, no hepatosplenomegaly, no masses and bowel sounds normal  MS: no gross musculoskeletal defects noted, no edema    Diagnostic Test Results:  Results for orders placed or performed in visit on 11/16/22   Hemoglobin A1c     Status: Abnormal   Result Value Ref Range    Hemoglobin A1C 7.4 (H) 0.0 - 5.6 %       ASSESSMENT/PLAN:   (Z00.00) Routine general medical examination at a health care facility  (primary encounter diagnosis)  Comment: 62 yr old female here for her physical.  Plan: Preventive measures discussed.     (E11.9) Type 2 diabetes mellitus without complication, without long-term current use of insulin (H)  Comment: A1C did not change much. Recommend that she continue to work on diet. Added glipizide. Recommend recheck of A1C in three months.  Plan: Hemoglobin A1c, Albumin Random Urine         Quantitative with Creat Ratio, Adult Eye          Referral, metFORMIN (GLUCOPHAGE) 500         MG tablet, glipiZIDE (GLUCOTROL XL) 2.5 MG 24         hr tablet, Hemoglobin A1c, OFFICE/OUTPT         VISIT,EST,LEVL IV           (E78.5) Hyperlipidemia LDL goal <100  Comment: Lipid labs ordered. Patient will be notified of results.  Plan: Lipid panel reflex to direct LDL Fasting,         OFFICE/OUTPT VISIT,EST,LEVL IV            (I10) Benign essential hypertension  Comment: Blood pressure in fair range. Medication faxed, recommend that she continue to work on weight and diet.  Plan: Basic metabolic panel  (Ca, Cl, CO2, Creat,  "        Gluc, K, Na, BUN), lisinopril (ZESTRIL) 40 MG         tablet, OFFICE/OUTPT VISIT,EST,LEVL IV      (Z12.11) Special screening for malignant neoplasms, colon  Comment: Patient will be notified of results  Plan: Fecal colorectal cancer screen FIT - Future         (S+30)        Patient has been advised of split billing requirements and indicates understanding: Yes      COUNSELING:  Reviewed preventive health counseling, as reflected in patient instructions       Regular exercise       Healthy diet/nutrition      BMI:   Estimated body mass index is 30.69 kg/m  as calculated from the following:    Height as of this encounter: 1.613 m (5' 3.5\").    Weight as of this encounter: 79.8 kg (176 lb).   Weight management plan: Discussed healthy diet and exercise guidelines      She reports that she has never smoked. She has never used smokeless tobacco.        {Breast Cancer Risk Calculator  BRCA-Related Cancer Risk Assessment: FHS-7 Tool   Conrado Millan MD  [facility]  "

## 2022-12-05 NOTE — RESULT ENCOUNTER NOTE
Please inform patient that test result showed high cholesterol  Patient is at moderate risk for cardiovascular disease. Recommend that she continue with her cholesterol medication.   Thank you.     Conrado Millan M.D.

## 2022-12-22 ENCOUNTER — LAB (OUTPATIENT)
Dept: LAB | Facility: CLINIC | Age: 62
End: 2022-12-22
Payer: COMMERCIAL

## 2022-12-22 DIAGNOSIS — Z12.11 SPECIAL SCREENING FOR MALIGNANT NEOPLASMS, COLON: ICD-10-CM

## 2022-12-22 PROCEDURE — 82274 ASSAY TEST FOR BLOOD FECAL: CPT

## 2022-12-22 NOTE — LETTER
December 26, 2022      Saniya CAITLIN Khoa  [address] [facility] [address]        Dear ,    We are writing to inform you of your test results.    Test result was within normal parameters.       Resulted Orders   Fecal colorectal cancer screen FIT - Future (S+30)   Result Value Ref Range    Occult Blood Screen FIT Negative Negative       If you have any questions or concerns, please call the clinic at the number listed above.       Sincerely,      Conrado Millan MD

## 2022-12-23 ENCOUNTER — TELEPHONE (OUTPATIENT)
Dept: FAMILY MEDICINE | Facility: CLINIC | Age: 62
End: 2022-12-23

## 2022-12-23 LAB — HEMOCCULT STL QL IA: NEGATIVE

## 2022-12-23 NOTE — TELEPHONE ENCOUNTER
Amy from [facility], [phone], [facility], called asking for order for FIT order.  Specimen is received without order.    Looks like order  7 days ago.    Extended order.    Lab will process.    Loida Miller RN   [facility]

## 2023-06-02 ENCOUNTER — HEALTH MAINTENANCE LETTER (OUTPATIENT)
Age: 63
End: 2023-06-02

## 2023-08-03 DIAGNOSIS — E11.9 TYPE 2 DIABETES MELLITUS WITHOUT COMPLICATION, WITHOUT LONG-TERM CURRENT USE OF INSULIN (H): ICD-10-CM

## 2023-08-04 NOTE — TELEPHONE ENCOUNTER
"Saniya is due for a diabetic recheck. Thank you!    Lotus oJnes, RN      Requested Prescriptions   Pending Prescriptions Disp Refills    glipiZIDE (GLUCOTROL XL) 2.5 MG 24 hr tablet [Pharmacy Med Name: GLIPIZIDE ER 2.5MG TABLETS] 90 tablet 3     Sig: TAKE 1 TABLET(2.5 MG) BY MOUTH DAILY       Sulfonylurea Agents Failed - 8/3/2023  6:04 AM        Failed - Patient has documented A1c within the specified period of time.     If HgbA1C is 8 or greater, it needs to be on file within the past 3 months.  If less than 8, must be on file within the past 6 months.     Recent Labs   Lab Test 11/16/22  0904   A1C 7.4*             Failed - Recent (6 mo) or future (30 days) visit within the authorizing provider's specialty     Patient had office visit in the last 6 months or has a visit in the next 30 days with authorizing provider or within the authorizing provider's specialty.  See \"Patient Info\" tab in inbasket, or \"Choose Columns\" in Meds & Orders section of the refill encounter.            Passed - Medication is active on med list        Passed - Patient is age 18 or older        Passed - No active pregnancy on record        Passed - Patient has a recent creatinine (normal) within the past 12 mos.     Recent Labs   Lab Test 11/16/22  0904   CR 0.84       Ok to refill medication if creatinine is low          Passed - Patient has not had a positive pregnancy test within the past 12 mos.               "

## 2023-08-08 RX ORDER — GLIPIZIDE 2.5 MG/1
2.5 TABLET, EXTENDED RELEASE ORAL DAILY
Qty: 90 TABLET | Refills: 0 | Status: SHIPPED | OUTPATIENT
Start: 2023-08-08 | End: 2023-11-01

## 2023-08-09 ENCOUNTER — LAB (OUTPATIENT)
Dept: LAB | Facility: CLINIC | Age: 63
End: 2023-08-09
Payer: COMMERCIAL

## 2023-08-09 DIAGNOSIS — E11.9 TYPE 2 DIABETES MELLITUS WITHOUT COMPLICATION (H): Primary | ICD-10-CM

## 2023-08-09 LAB — HBA1C MFR BLD: 6.4 % (ref 0–5.6)

## 2023-08-09 PROCEDURE — 36415 COLL VENOUS BLD VENIPUNCTURE: CPT

## 2023-08-09 PROCEDURE — 83036 HEMOGLOBIN GLYCOSYLATED A1C: CPT

## 2023-08-09 NOTE — LETTER
August 10, 2023      Saniya Mccoyenson  [address] [facility] [address]        Dear ,    We are writing to inform you of your test results.    Your test result showed a marked improvement in the A1C. A1C is at 6.4. Keep up the good work.     Resulted Orders   HEMOGLOBIN A1C   Result Value Ref Range    Hemoglobin A1C 6.4 (H) 0.0 - 5.6 %      Comment:      Normal <5.7%   Prediabetes 5.7-6.4%    Diabetes 6.5% or higher     Note: Adopted from ADA consensus guidelines.       If you have any questions or concerns, please call the clinic at the number listed above.       Sincerely,      Conrado Millan MD

## 2023-08-10 NOTE — RESULT ENCOUNTER NOTE
Please inform patient that test result showed a marked improvement in her A1C.Her A1C ia at 6.4 from 7.4. she should keep up the good work.   Thank you.     Conrado Millan M.D.

## 2023-10-17 ENCOUNTER — PATIENT OUTREACH (OUTPATIENT)
Dept: CARE COORDINATION | Facility: CLINIC | Age: 63
End: 2023-10-17
Payer: COMMERCIAL

## 2023-10-31 ENCOUNTER — PATIENT OUTREACH (OUTPATIENT)
Dept: CARE COORDINATION | Facility: CLINIC | Age: 63
End: 2023-10-31
Payer: COMMERCIAL

## 2023-11-01 DIAGNOSIS — E78.5 HYPERLIPIDEMIA LDL GOAL <100: ICD-10-CM

## 2023-11-01 DIAGNOSIS — I10 BENIGN ESSENTIAL HYPERTENSION: ICD-10-CM

## 2023-11-01 DIAGNOSIS — E11.9 TYPE 2 DIABETES MELLITUS WITHOUT COMPLICATION, WITHOUT LONG-TERM CURRENT USE OF INSULIN (H): ICD-10-CM

## 2023-11-01 RX ORDER — LISINOPRIL 40 MG/1
40 TABLET ORAL DAILY
Qty: 90 TABLET | Refills: 0 | Status: SHIPPED | OUTPATIENT
Start: 2023-11-01 | End: 2023-12-11

## 2023-11-01 RX ORDER — ATORVASTATIN CALCIUM 40 MG/1
40 TABLET, FILM COATED ORAL DAILY
Qty: 90 TABLET | Refills: 0 | Status: SHIPPED | OUTPATIENT
Start: 2023-11-01 | End: 2023-12-11

## 2023-11-01 RX ORDER — GLIPIZIDE 2.5 MG/1
2.5 TABLET, EXTENDED RELEASE ORAL DAILY
Qty: 90 TABLET | Refills: 0 | Status: SHIPPED | OUTPATIENT
Start: 2023-11-01 | End: 2023-12-11

## 2023-11-01 NOTE — TELEPHONE ENCOUNTER
Pt is in need of medication refills before her upcoming appt on 12/11.    atorvastatin (LIPITOR) 40 MG tablet - pt only has 2 left    lisinopril (ZESTRIL) 40 MG tablet- Has some but not enough to get through till appt       glipiZIDE (GLUCOTROL XL) 2.5 MG 24 hr tablet - as some but not enough to get till appt

## 2023-11-01 NOTE — TELEPHONE ENCOUNTER
"Requested Prescriptions   Pending Prescriptions Disp Refills    glipiZIDE (GLUCOTROL XL) 2.5 MG 24 hr tablet [Pharmacy Med Name: GLIPIZIDE ER 2.5MG TABLETS] 90 tablet 0     Sig: TAKE 1 TABLET(2.5 MG) BY MOUTH DAILY       Sulfonylurea Agents Failed - 11/1/2023  6:02 AM        Failed - Recent (6 mo) or future (30 days) visit within the authorizing provider's specialty     Patient had office visit in the last 6 months or has a visit in the next 30 days with authorizing provider or within the authorizing provider's specialty.  See \"Patient Info\" tab in inbasket, or \"Choose Columns\" in Meds & Orders section of the refill encounter.            Passed - Patient has documented A1c within the specified period of time.     If HgbA1C is 8 or greater, it needs to be on file within the past 3 months.  If less than 8, must be on file within the past 6 months.     Recent Labs   Lab Test 08/09/23  1142   A1C 6.4*             Passed - Medication is active on med list        Passed - Patient is age 18 or older        Passed - No active pregnancy on record        Passed - Patient has a recent creatinine (normal) within the past 12 mos.     Recent Labs   Lab Test 11/16/22  0904   CR 0.84       Ok to refill medication if creatinine is low          Passed - Patient has not had a positive pregnancy test within the past 12 mos.             "

## 2023-11-23 DIAGNOSIS — E11.9 TYPE 2 DIABETES MELLITUS WITHOUT COMPLICATION, WITHOUT LONG-TERM CURRENT USE OF INSULIN (H): ICD-10-CM

## 2023-12-11 ENCOUNTER — OFFICE VISIT (OUTPATIENT)
Dept: FAMILY MEDICINE | Facility: CLINIC | Age: 63
End: 2023-12-11
Payer: COMMERCIAL

## 2023-12-11 VITALS
HEIGHT: 65 IN | WEIGHT: 176 LBS | BODY MASS INDEX: 29.32 KG/M2 | SYSTOLIC BLOOD PRESSURE: 136 MMHG | OXYGEN SATURATION: 97 % | HEART RATE: 72 BPM | DIASTOLIC BLOOD PRESSURE: 64 MMHG | RESPIRATION RATE: 18 BRPM | TEMPERATURE: 98.1 F

## 2023-12-11 DIAGNOSIS — Z12.31 VISIT FOR SCREENING MAMMOGRAM: ICD-10-CM

## 2023-12-11 DIAGNOSIS — Z12.11 SCREEN FOR COLON CANCER: ICD-10-CM

## 2023-12-11 DIAGNOSIS — Z13.820 SCREENING FOR OSTEOPOROSIS: ICD-10-CM

## 2023-12-11 DIAGNOSIS — Z00.00 ENCOUNTER FOR MEDICARE ANNUAL WELLNESS EXAM: Primary | ICD-10-CM

## 2023-12-11 DIAGNOSIS — I10 BENIGN ESSENTIAL HYPERTENSION: ICD-10-CM

## 2023-12-11 DIAGNOSIS — N95.9 UNSPECIFIED MENOPAUSAL AND PERIMENOPAUSAL DISORDER: ICD-10-CM

## 2023-12-11 DIAGNOSIS — E11.9 TYPE 2 DIABETES MELLITUS WITHOUT COMPLICATION, WITHOUT LONG-TERM CURRENT USE OF INSULIN (H): ICD-10-CM

## 2023-12-11 DIAGNOSIS — E78.5 HYPERLIPIDEMIA LDL GOAL <100: ICD-10-CM

## 2023-12-11 LAB
ANION GAP SERPL CALCULATED.3IONS-SCNC: 9 MMOL/L (ref 7–15)
BUN SERPL-MCNC: 29.9 MG/DL (ref 8–23)
CALCIUM SERPL-MCNC: 9.6 MG/DL (ref 8.8–10.2)
CHLORIDE SERPL-SCNC: 105 MMOL/L (ref 98–107)
CHOLEST SERPL-MCNC: 189 MG/DL
CREAT SERPL-MCNC: 1 MG/DL (ref 0.51–0.95)
CREAT UR-MCNC: 96.2 MG/DL
DEPRECATED HCO3 PLAS-SCNC: 26 MMOL/L (ref 22–29)
EGFRCR SERPLBLD CKD-EPI 2021: 62 ML/MIN/1.73M2
FASTING STATUS PATIENT QL REPORTED: YES
GLUCOSE SERPL-MCNC: 153 MG/DL (ref 70–99)
HBA1C MFR BLD: 6.4 % (ref 0–5.6)
HDLC SERPL-MCNC: 58 MG/DL
LDLC SERPL CALC-MCNC: 107 MG/DL
MICROALBUMIN UR-MCNC: 16.9 MG/L
MICROALBUMIN/CREAT UR: 17.57 MG/G CR (ref 0–25)
NONHDLC SERPL-MCNC: 131 MG/DL
POTASSIUM SERPL-SCNC: 4.5 MMOL/L (ref 3.4–5.3)
SODIUM SERPL-SCNC: 140 MMOL/L (ref 135–145)
TRIGL SERPL-MCNC: 122 MG/DL

## 2023-12-11 PROCEDURE — 82043 UR ALBUMIN QUANTITATIVE: CPT | Performed by: FAMILY MEDICINE

## 2023-12-11 PROCEDURE — 83036 HEMOGLOBIN GLYCOSYLATED A1C: CPT | Performed by: FAMILY MEDICINE

## 2023-12-11 PROCEDURE — 80048 BASIC METABOLIC PNL TOTAL CA: CPT | Performed by: FAMILY MEDICINE

## 2023-12-11 PROCEDURE — 99213 OFFICE O/P EST LOW 20 MIN: CPT | Mod: 25 | Performed by: FAMILY MEDICINE

## 2023-12-11 PROCEDURE — 36415 COLL VENOUS BLD VENIPUNCTURE: CPT | Performed by: FAMILY MEDICINE

## 2023-12-11 PROCEDURE — G0402 INITIAL PREVENTIVE EXAM: HCPCS | Performed by: FAMILY MEDICINE

## 2023-12-11 PROCEDURE — 80061 LIPID PANEL: CPT | Performed by: FAMILY MEDICINE

## 2023-12-11 PROCEDURE — 82570 ASSAY OF URINE CREATININE: CPT | Performed by: FAMILY MEDICINE

## 2023-12-11 RX ORDER — LISINOPRIL 40 MG/1
40 TABLET ORAL DAILY
Qty: 90 TABLET | Refills: 3 | Status: SHIPPED | OUTPATIENT
Start: 2023-12-11

## 2023-12-11 RX ORDER — ATORVASTATIN CALCIUM 40 MG/1
40 TABLET, FILM COATED ORAL DAILY
Qty: 90 TABLET | Refills: 3 | Status: SHIPPED | OUTPATIENT
Start: 2023-12-11

## 2023-12-11 RX ORDER — GLIPIZIDE 2.5 MG/1
2.5 TABLET, EXTENDED RELEASE ORAL DAILY
Qty: 90 TABLET | Refills: 3 | Status: SHIPPED | OUTPATIENT
Start: 2023-12-11

## 2023-12-11 ASSESSMENT — ENCOUNTER SYMPTOMS
HEMATURIA: 0
ARTHRALGIAS: 1
WEAKNESS: 0
PALPITATIONS: 0
ABDOMINAL PAIN: 0
SORE THROAT: 0
PARESTHESIAS: 0
FEVER: 0
DIARRHEA: 0
DIZZINESS: 0
NAUSEA: 0
EYE PAIN: 0
BREAST MASS: 0
SHORTNESS OF BREATH: 0
COUGH: 0
NERVOUS/ANXIOUS: 0
HEARTBURN: 0
JOINT SWELLING: 0
FREQUENCY: 1
HEADACHES: 0
DYSURIA: 0
MYALGIAS: 1
CONSTIPATION: 0
HEMATOCHEZIA: 0
CHILLS: 0

## 2023-12-11 ASSESSMENT — PAIN SCALES - GENERAL: PAINLEVEL: MILD PAIN (3)

## 2023-12-11 ASSESSMENT — ACTIVITIES OF DAILY LIVING (ADL): CURRENT_FUNCTION: NO ASSISTANCE NEEDED

## 2023-12-11 NOTE — PROGRESS NOTES
"SUBJECTIVE:   Saniya is a 63 year old, presenting for the following:    Patient is a 63 yr old female here for her annual physical and medication refills. She reports no acute symptoms and feels well.     Type II diabetes  Well controlled. Patient is to continue her medications. A1C was 6.4. Patient reminded of her eye examination.     Hypertension  Blood pressure in fair range.  Medication is refilled and labs ordered..    Hyperlipidemia  Lipid labs ordered  Medication faxed. Patient will be notified of results.    Preventive  Due for colon cancer screening- FIT test  Mammogram - due  Bone density scan due      Physical (Wellness physical.), Blood Draw (Patient is fasting today for labs.), Diabetes (Recheck on diabetes.), Lipids (Recheck on lipids.), Hypertension (Recheck on blood pressure.), Imm/Inj (Declined Flu and Covid today.  Discuss if she is due for a Prevnar 20.), and Health Maintenance (She will do the FIT test.)        12/11/2023     9:10 AM   Additional Questions   Roomed by Leora Levin, CMA       Are you in the first 12 months of your Medicare coverage?  She is not sure so we did the vision in case.  Yes,  Visual Acuity:  Right Eye: 20/20   Left Eye: 20/32  Both Eyes: 20/20    Healthy Habits:     In general, how would you rate your overall health?  Good    Frequency of exercise:  2-3 days/week    Duration of exercise:  Less than 15 minutes    Do you usually eat at least 4 servings of fruit and vegetables a day, include whole grains    & fiber and avoid regularly eating high fat or \"junk\" foods?  Yes    Taking medications regularly:  Yes    Medication side effects:  None    Ability to successfully perform activities of daily living:  No assistance needed    Home Safety:  No safety concerns identified    Hearing Impairment:  No hearing concerns    In the past 6 months, have you been bothered by leaking of urine?  No    In general, how would you rate your overall mental or emotional health?  Good    " Additional concerns today:  No      Today's PHQ-2 Score:       12/11/2023     9:05 AM   PHQ-2 ( 1999 [employer])   Q1: Little interest or pleasure in doing things 0   Q2: Feeling down, depressed or hopeless 0   PHQ-2 Score 0   Q1: Little interest or pleasure in doing things Not at all   Q2: Feeling down, depressed or hopeless Not at all   PHQ-2 Score 0           Have you ever done Advance Care Planning? (For example, a Health Directive, POLST, or a discussion with a medical provider or your loved ones about your wishes): No, advance care planning information given to patient to review.  Patient declined advance care planning discussion at this time.       Fall risk  Fallen 2 or more times in the past year?: No  Any fall with injury in the past year?: No    Cognitive Screening   1) Repeat 3 items (Leader, Season, Table)    2) Clock draw: NORMAL  3) 3 item recall: Recalls 2 objects   Results: NORMAL clock, 1-2 items recalled: COGNITIVE IMPAIRMENT LESS LIKELY    Mini-CogTM Copyright KORY Puentes. Licensed by the author for use in [facility]; reprinted with permission (sarah@[email or web address]). All rights reserved.      Do you have sleep apnea, excessive snoring or daytime drowsiness? : no    Reviewed and updated as needed this visit by clinical staff   Tobacco  Allergies  Meds  Problems             Reviewed and updated as needed this visit by Provider    Allergies  Meds  Problems            Social History     Tobacco Use    Smoking status: Never    Smokeless tobacco: Never   Substance Use Topics    Alcohol use: No           12/11/2023     9:05 AM   Alcohol Use   Prescreen: >3 drinks/day or >7 drinks/week? No     Do you have a current opioid prescription? No  Do you use any other controlled substances or medications that are not prescribed by a provider? None          Diabetes Follow-up    How often are you checking your blood sugar? A few times a week  What time of day are you checking your blood sugars (select all  that apply)?  Before meals and sometimes when getting home from work.  Have you had any blood sugars above 200?  No  Have you had any blood sugars below 70?  Yes at times.  What symptoms do you notice when your blood sugar is low?  None  What concerns do you have today about your diabetes? Other: Has noticed foods don't taste as good.  Looser stools.   Do you have any of these symptoms? (Select all that apply)  No numbness or tingling in feet.  No redness, sores or blisters on feet.  No complaints of excessive thirst.  No reports of blurry vision.  No significant changes to weight.  Have you had a diabetic eye exam in the last 12 months? Unsure of the date.  May have been last year.          Hyperlipidemia Follow-Up    Are you regularly taking any medication or supplement to lower your cholesterol?   Yes- Atorvastatin.   Are you having muscle aches or other side effects that you think could be caused by your cholesterol lowering medication?  No    Hypertension Follow-up    Do you check your blood pressure regularly outside of the clinic? Yes, at a store at times.  Are you following a low salt diet? Yes-tries to.  Are your blood pressures ever more than 140 on the top number (systolic) OR more   than 90 on the bottom number (diastolic), for example 140/90? No    BP Readings from Last 2 Encounters:   12/11/23 136/64   11/16/22 136/74     Hemoglobin A1C (%)   Date Value   12/11/2023 6.4 (H)   08/09/2023 6.4 (H)   11/02/2020 9.5 (H)   10/21/2019 6.7 (H)     LDL Cholesterol Calculated (mg/dL)   Date Value   11/16/2022 107 (H)   11/16/2021 91   11/02/2020 102 (H)   10/21/2019 92       Current providers sharing in care for this patient include:   Patient Care Team:  Conrado Millan MD as PCP - General (Family Medicine)  Conrado Millan MD as Assigned PCP    The following health maintenance items are reviewed in Epic and correct as of today:  Health Maintenance   Topic Date Due    DEXA  Never done     COVID-19 Vaccine (1) Never done    RSV VACCINE (Pregnancy & 60+) (1 - 1-dose 60+ series) Never done    EYE EXAM  2021    ZOSTER IMMUNIZATION (2 of 2) 2021    DIABETIC FOOT EXAM  2022    Pneumococcal Vaccine: 65+ Years (3 - PPSV23 or PCV20) 2023    INFLUENZA VACCINE (1) 2023    BMP  2023    LIPID  2023    MICROALBUMIN  2023    MEDICARE ANNUAL WELLNESS VISIT  2023    COLORECTAL CANCER SCREENING  2023    MAMMO SCREENING  2024    A1C  2024    ANNUAL REVIEW OF HM ORDERS  2024    FALL RISK ASSESSMENT  2024    ADVANCE CARE PLANNING  2028    DTAP/TDAP/TD IMMUNIZATION (3 - Td or Tdap) 2032    HEPATITIS C SCREENING  Completed    HIV SCREENING  Completed    PHQ-2 (once per calendar year)  Completed    IPV IMMUNIZATION  Aged Out    HPV IMMUNIZATION  Aged Out    MENINGITIS IMMUNIZATION  Aged Out    RSV MONOCLONAL ANTIBODY  Aged Out    PAP  Discontinued     Lab work is in process  Labs reviewed in EPIC  BP Readings from Last 3 Encounters:   23 136/64   22 136/74   21 128/68    Wt Readings from Last 3 Encounters:   23 79.8 kg (176 lb)   22 79.8 kg (176 lb)   21 82.1 kg (181 lb)                  Patient Active Problem List   Diagnosis    GERD (gastroesophageal reflux disease)    Hyperlipidemia LDL goal <130    Family history of diabetes mellitus    Essential hypertension, benign    Non morbid obesity due to excess calories    Type 2 diabetes mellitus without complication (H)    Special screening for malignant neoplasms, colon     Past Surgical History:   Procedure Laterality Date     SECTION  1999    GALLBLADDER SURGERY  2005       Social History     Tobacco Use    Smoking status: Never    Smokeless tobacco: Never   Substance Use Topics    Alcohol use: No     Family History   Problem Relation Age of Onset    Hypertension Mother     Dementia Mother     Heart Disease Father          heart disease    Thyroid Disease Daughter         thyroid disease    Diabetes Brother     Diabetes Brother     Diabetes Brother     Diabetes Brother          Current Outpatient Medications   Medication Sig Dispense Refill    atorvastatin (LIPITOR) 40 MG tablet Take 1 tablet (40 mg) by mouth daily 90 tablet 3    blood glucose (NO BRAND SPECIFIED) test strip test blood sugar 3 times daily or as directed. 100 strip 3    blood glucose monitoring (ACCU-CHEK FASTCLIX) lancets 1 each daily Use to test blood sugar 1 times daily or as directed. 102 each 11    blood glucose monitoring (NO BRAND SPECIFIED) test strip Use to test blood sugars 1 times daily or as directed. One Touch Ultra blue, one touch verio. 100 strip 4    glipiZIDE (GLUCOTROL XL) 2.5 MG 24 hr tablet Take 1 tablet (2.5 mg) by mouth daily 90 tablet 3    lisinopril (ZESTRIL) 40 MG tablet Take 1 tablet (40 mg) by mouth daily 90 tablet 3    metFORMIN (GLUCOPHAGE) 500 MG tablet Take 1 tablet (500 mg) by mouth 2 times daily (with meals) 180 tablet 3     No Known Allergies  Recent Labs   Lab Test 12/11/23  0939 08/09/23  1142 11/16/22  0904 11/16/21  0844 11/16/21  0747 11/02/20  1359 03/16/20  0912 10/21/19  0803 10/16/18  0803 06/01/17  0809 03/09/17  0758   A1C 6.4* 6.4* 7.4*  --    < > 9.5*  --  6.7* 6.6*   < > 8.2*   LDL  --   --  107* 91  --  102*  --  92 151*  --  121*   HDL  --   --  47* 55  --  59  --  55 52  --  72   TRIG  --   --  175* 123  --  117  --  160* 155*  --  56   ALT  --   --   --   --   --   --   --   --   --   --  138*   CR  --   --  0.84  --   --   --  0.68 0.70 0.70  --  0.80   GFRESTIMATED  --   --  77  --   --   --  >90 >90 86  --  73   GFRESTBLACK  --   --   --   --   --   --  >90 >90 >90  --  89   POTASSIUM  --   --  4.2  --   --   --  4.2 4.0 3.9  --  3.8   TSH  --   --   --   --   --   --   --   --  2.93  --  1.85    < > = values in this interval not displayed.          Mammogram Screening: Mammogram Screening: Recommended mammography  "every 1-2 years with patient discussion and risk factor consideration        11/16/2021     7:30 AM   Breast CA Risk Assessment (FHS-7)   Do you have a family history of breast, colon, or ovarian cancer? No / Unknown         Mammogram Screening: Recommended mammography every 1-2 years with patient discussion and risk factor consideration  Pertinent mammograms are reviewed under the imaging tab.    Review of Systems   Constitutional:  Negative for chills and fever.   HENT:  Negative for congestion, ear pain, hearing loss and sore throat.    Eyes:  Negative for pain and visual disturbance.   Respiratory:  Negative for cough and shortness of breath.    Cardiovascular:  Negative for chest pain, palpitations and peripheral edema.   Gastrointestinal:  Negative for abdominal pain, constipation, diarrhea, heartburn, hematochezia and nausea.   Breasts:  Negative for tenderness, breast mass and discharge.   Genitourinary:  Positive for frequency. Negative for dysuria, genital sores, hematuria, pelvic pain, urgency, vaginal bleeding and vaginal discharge.   Musculoskeletal:  Positive for arthralgias and myalgias. Negative for joint swelling.   Skin:  Negative for rash.   Neurological:  Negative for dizziness, weakness, headaches and paresthesias.   Psychiatric/Behavioral:  Negative for mood changes. The patient is not nervous/anxious.          OBJECTIVE:   /64   Pulse 72   Temp 98.1  F (36.7  C) (Tympanic)   Resp 18   Ht 1.638 m (5' 4.5\")   Wt 79.8 kg (176 lb)   SpO2 97%   BMI 29.74 kg/m   Estimated body mass index is 29.74 kg/m  as calculated from the following:    Height as of this encounter: 1.638 m (5' 4.5\").    Weight as of this encounter: 79.8 kg (176 lb).  Physical Exam  GENERAL: healthy, alert and no distress  EYES: Eyes grossly normal to inspection, PERRL and conjunctivae and sclerae normal  HENT: ear canals and TM's normal, nose and mouth without ulcers or lesions  NECK: no adenopathy, no asymmetry, " masses, or scars and thyroid normal to palpation  RESP: lungs clear to auscultation - no rales, rhonchi or wheezes  CV: regular rate and rhythm, normal S1 S2, no S3 or S4, no murmur, click or rub, no peripheral edema and peripheral pulses strong  ABDOMEN: soft, nontender, no hepatosplenomegaly, no masses and bowel sounds normal  MS: no gross musculoskeletal defects noted, no edema  SKIN: no suspicious lesions or rashes  NEURO: Normal strength and tone, mentation intact and speech normal  PSYCH: mentation appears normal, affect normal/bright    Diagnostic Test Results:  Labs reviewed in Epic  Results for orders placed or performed in visit on 12/11/23 (from the past 24 hour(s))   Hemoglobin A1c   Result Value Ref Range    Hemoglobin A1C 6.4 (H) 0.0 - 5.6 %       ASSESSMENT / PLAN:       ICD-10-CM    1. Encounter for Medicare annual wellness exam  Z00.00       2. Type 2 diabetes mellitus without complication, without long-term current use of insulin (H)  E11.9 Albumin Random Urine Quantitative with Creat Ratio     Hemoglobin A1c     metFORMIN (GLUCOPHAGE) 500 MG tablet     glipiZIDE (GLUCOTROL XL) 2.5 MG 24 hr tablet     OFFICE/OUTPT VISIT,EST,LEVL IV      3. Screen for colon cancer  Z12.11 Fecal colorectal cancer screen FIT - Future (S+30)      4. Screening for osteoporosis  Z13.820 DX Hip/Pelvis/Spine      5. Benign essential hypertension  I10 lisinopril (ZESTRIL) 40 MG tablet     OFFICE/OUTPT VISIT,EST,LEVL IV      6. Hyperlipidemia LDL goal <100  E78.5 atorvastatin (LIPITOR) 40 MG tablet     OFFICE/OUTPT VISIT,EST,LEVL IV      7. Unspecified menopausal and perimenopausal disorder  N95.9 DX Hip/Pelvis/Spine      8. Visit for screening mammogram  Z12.31 MA Screen Bilateral w/Emilio          63 yr old female here for her annual physical with medication refills.    Overall doing very well . Medication refilled and labs ordered.     Patient has been advised of split billing requirements and indicates understanding:  "Yes      COUNSELING:  Reviewed preventive health counseling, as reflected in patient instructions       Regular exercise       Healthy diet/nutrition       Vision screening      BMI:   Estimated body mass index is 29.74 kg/m  as calculated from the following:    Height as of this encounter: 1.638 m (5' 4.5\").    Weight as of this encounter: 79.8 kg (176 lb).   Weight management plan: Discussed healthy diet and exercise guidelines      She reports that she has never smoked. She has never used smokeless tobacco.      Appropriate preventive services were discussed with this patient, including applicable screening as appropriate for fall prevention, nutrition, physical activity, Tobacco-use cessation, weight loss and cognition.  Checklist reviewing preventive services available has been given to the patient.    Reviewed patients plan of care and provided an AVS. The Basic Care Plan (routine screening as documented in Health Maintenance) for Saniya meets the Care Plan requirement. This Care Plan has been established and reviewed with the Patient.          Conrado Millan MD  [facility]    Identified Health Risks:  I have reviewed Opioid Use Disorder and Substance Use Disorder risk factors and made any needed referrals.   "

## 2023-12-11 NOTE — PATIENT INSTRUCTIONS
Patient Education   Personalized Prevention Plan  You are due for the preventive services outlined below.  Your care team is available to assist you in scheduling these services.  If you have already completed any of these items, please share that information with your care team to update in your medical record.  Health Maintenance Due   Topic Date Due     Osteoporosis Screening  Never done     COVID-19 Vaccine (1) Never done     RSV VACCINE (Pregnancy & 60+) (1 - 1-dose 60+ series) Never done     Eye Exam  01/01/2021     Zoster (Shingles) Vaccine (2 of 2) 03/18/2021     Diabetic Foot Exam  11/16/2022     Pneumococcal Vaccine (3 - PPSV23 or PCV20) 01/09/2023     Flu Vaccine (1) 09/01/2023     Basic Metabolic Panel  11/16/2023     Cholesterol Lab  11/16/2023     Kidney Microalbumin Urine Test  11/16/2023     Annual Wellness Visit  11/16/2023     Colorectal Cancer Screening  12/20/2023

## 2023-12-11 NOTE — LETTER
December 13, 2023      Saniya Couch  [address] [facility] [address]        Dear ,    We are writing to inform you of your test results.    Test result was within normal parameters except the cholesterol was mildly elevated. Patient is to continue her medication and continue lifestyle changes.     Resulted Orders   BASIC METABOLIC PANEL   Result Value Ref Range    Sodium 140 135 - 145 mmol/L      Comment:      Reference intervals for this test were updated on 09/26/2023 to more accurately reflect our healthy population. There may be differences in the flagging of prior results with similar values performed with this method. Interpretation of those prior results can be made in the context of the updated reference intervals.     Potassium 4.5 3.4 - 5.3 mmol/L    Chloride 105 98 - 107 mmol/L    Carbon Dioxide (CO2) 26 22 - 29 mmol/L    Anion Gap 9 7 - 15 mmol/L    Urea Nitrogen 29.9 (H) 8.0 - 23.0 mg/dL    Creatinine 1.00 (H) 0.51 - 0.95 mg/dL    GFR Estimate 62 >60 mL/min/1.73m2    Calcium 9.6 8.8 - 10.2 mg/dL    Glucose 153 (H) 70 - 99 mg/dL   Lipid panel reflex to direct LDL Non-fasting   Result Value Ref Range    Cholesterol 189 <200 mg/dL    Triglycerides 122 <150 mg/dL    Direct Measure HDL 58 >=50 mg/dL    LDL Cholesterol Calculated 107 (H) <=100 mg/dL    Non HDL Cholesterol 131 (H) <130 mg/dL    Patient Fasting > 8hrs? Yes     Narrative    Cholesterol  Desirable:  <200 mg/dL    Triglycerides  Normal:  Less than 150 mg/dL  Borderline High:  150-199 mg/dL  High:  200-499 mg/dL  Very High:  Greater than or equal to 500 mg/dL    Direct Measure HDL  Female:  Greater than or equal to 50 mg/dL   Male:  Greater than or equal to 40 mg/dL    LDL Cholesterol  Desirable:  <100mg/dL  Above Desirable:  100-129 mg/dL   Borderline High:  130-159 mg/dL   High:  160-189 mg/dL   Very High:  >= 190 mg/dL    Non HDL Cholesterol  Desirable:  130 mg/dL  Above Desirable:  130-159 mg/dL  Borderline High:   160-189 mg/dL  High:  190-219 mg/dL  Very High:  Greater than or equal to 220 mg/dL   Albumin Random Urine Quantitative with Creat Ratio   Result Value Ref Range    Creatinine Urine mg/dL 96.2 mg/dL      Comment:      The reference ranges have not been established in urine creatinine. The results should be integrated into the clinical context for interpretation.    Albumin Urine mg/L 16.9 mg/L      Comment:      The reference ranges have not been established in urine albumin. The results should be integrated into the clinical context for interpretation.    Albumin Urine mg/g Cr 17.57 0.00 - 25.00 mg/g Cr      Comment:      Microalbuminuria is defined as an albumin:creatinine ratio of 17 to 299 for males and 25 to 299 for females. A ratio of albumin:creatinine of 300 or higher is indicative of overt proteinuria.  Due to biologic variability, positive results should be confirmed by a second, first-morning random or 24-hour timed urine specimen. If there is discrepancy, a third specimen is recommended. When 2 out of 3 results are in the microalbuminuria range, this is evidence for incipient nephropathy and warrants increased efforts at glucose control, blood pressure control, and institution of therapy with an angiotensin-converting-enzyme (ACE) inhibitor (if the patient can tolerate it).     Hemoglobin A1c   Result Value Ref Range    Hemoglobin A1C 6.4 (H) 0.0 - 5.6 %      Comment:      Normal <5.7%   Prediabetes 5.7-6.4%    Diabetes 6.5% or higher     Note: Adopted from ADA consensus guidelines.       If you have any questions or concerns, please call the clinic at the number listed above.       Sincerely,      Conrado Millan MD

## 2023-12-12 ENCOUNTER — LAB (OUTPATIENT)
Dept: LAB | Facility: CLINIC | Age: 63
End: 2023-12-12
Payer: COMMERCIAL

## 2023-12-12 DIAGNOSIS — Z12.11 SCREEN FOR COLON CANCER: ICD-10-CM

## 2023-12-12 PROCEDURE — 82274 ASSAY TEST FOR BLOOD FECAL: CPT

## 2023-12-12 NOTE — LETTER
December 14, 2023      Saniya Couch  [address] [facility] [address]        Dear ,    We are writing to inform you of your test results.    Covering for primary/ordering provider:     Your FIT colon cancer screening test is negative. Plan to repeat in one year.  Le Shafer CNP       Resulted Orders   Fecal colorectal cancer screen FIT - Future (S+30)   Result Value Ref Range    Occult Blood Screen FIT Negative Negative       If you have any questions or concerns, please call the clinic at the number listed above.       Sincerely,      Conrado Millan MD

## 2023-12-13 LAB — HEMOCCULT STL QL IA: NEGATIVE

## 2023-12-13 NOTE — RESULT ENCOUNTER NOTE
Please inform patient that test result was within normal parameters except the cholesterol was mildly elevated. Patient is to continue her medication and continue lifestyle changes.   Thank you.     Conrado Millan M.D.

## 2024-02-14 ENCOUNTER — HOSPITAL ENCOUNTER (OUTPATIENT)
Dept: MAMMOGRAPHY | Facility: CLINIC | Age: 64
Discharge: HOME | End: 2024-02-14
Attending: FAMILY MEDICINE
Payer: COMMERCIAL

## 2024-02-14 ENCOUNTER — HOSPITAL ENCOUNTER (OUTPATIENT)
Dept: BONE DENSITY | Facility: CLINIC | Age: 64
Discharge: HOME | End: 2024-02-14
Attending: FAMILY MEDICINE
Payer: COMMERCIAL

## 2024-02-14 DIAGNOSIS — N95.9 UNSPECIFIED MENOPAUSAL AND PERIMENOPAUSAL DISORDER: ICD-10-CM

## 2024-02-14 DIAGNOSIS — Z12.31 VISIT FOR SCREENING MAMMOGRAM: ICD-10-CM

## 2024-02-14 DIAGNOSIS — Z13.820 SCREENING FOR OSTEOPOROSIS: ICD-10-CM

## 2024-02-14 PROCEDURE — 77080 DXA BONE DENSITY AXIAL: CPT

## 2024-02-14 PROCEDURE — 77067 SCR MAMMO BI INCL CAD: CPT

## 2024-02-15 NOTE — RESULT ENCOUNTER NOTE
Please inform the patient that her bone density scan showed osteopenia.  This is an intermediate category that is in between normal and osteoporosis.  People with osteopenia should work on taking in 4554-0511 mg of calcium with vitamin D daily. They should also be getting daily weight bearing exercise (walking works)   Thank you.     Conrado Millan M.D.

## 2024-04-15 ENCOUNTER — ANCILLARY PROCEDURE (OUTPATIENT)
Dept: GENERAL RADIOLOGY | Facility: CLINIC | Age: 64
End: 2024-04-15
Attending: FAMILY MEDICINE
Payer: COMMERCIAL

## 2024-04-15 ENCOUNTER — OFFICE VISIT (OUTPATIENT)
Dept: FAMILY MEDICINE | Facility: CLINIC | Age: 64
End: 2024-04-15
Payer: COMMERCIAL

## 2024-04-15 VITALS
HEART RATE: 72 BPM | WEIGHT: 178.7 LBS | SYSTOLIC BLOOD PRESSURE: 128 MMHG | RESPIRATION RATE: 16 BRPM | OXYGEN SATURATION: 98 % | DIASTOLIC BLOOD PRESSURE: 72 MMHG | HEIGHT: 64 IN | TEMPERATURE: 97.7 F | BODY MASS INDEX: 30.51 KG/M2

## 2024-04-15 DIAGNOSIS — E11.9 TYPE 2 DIABETES MELLITUS WITHOUT COMPLICATION, WITHOUT LONG-TERM CURRENT USE OF INSULIN (H): ICD-10-CM

## 2024-04-15 DIAGNOSIS — M25.511 CHRONIC RIGHT SHOULDER PAIN: ICD-10-CM

## 2024-04-15 DIAGNOSIS — G89.29 CHRONIC RIGHT SHOULDER PAIN: ICD-10-CM

## 2024-04-15 DIAGNOSIS — M25.511 CHRONIC RIGHT SHOULDER PAIN: Primary | ICD-10-CM

## 2024-04-15 DIAGNOSIS — G89.29 CHRONIC RIGHT SHOULDER PAIN: Primary | ICD-10-CM

## 2024-04-15 PROCEDURE — 73030 X-RAY EXAM OF SHOULDER: CPT | Mod: TC | Performed by: RADIOLOGY

## 2024-04-15 PROCEDURE — 99213 OFFICE O/P EST LOW 20 MIN: CPT | Performed by: FAMILY MEDICINE

## 2024-04-15 RX ORDER — RESPIRATORY SYNCYTIAL VIRUS VACCINE 120MCG/0.5
0.5 KIT INTRAMUSCULAR ONCE
Qty: 1 EACH | Refills: 0 | Status: CANCELLED | OUTPATIENT
Start: 2024-04-15 | End: 2024-04-15

## 2024-04-15 RX ORDER — NAPROXEN 500 MG/1
500 TABLET ORAL 2 TIMES DAILY WITH MEALS
Qty: 20 TABLET | Refills: 0 | Status: SHIPPED | OUTPATIENT
Start: 2024-04-15

## 2024-04-15 ASSESSMENT — PAIN SCALES - GENERAL: PAINLEVEL: MODERATE PAIN (4)

## 2024-04-15 NOTE — PROGRESS NOTES
Assessment & Plan     (M25.511,  G89.29) Chronic right shoulder pain  (primary encounter diagnosis)  Comment: discussed x-ray findings with patient - likely has a tendonitis. Recommend seeing sports medicine for a cortisone injection.  Plan: XR Shoulder Right 2 Views, Orthopedic         Referral, naproxen (NAPROSYN) 500 MG tablet                (E11.9) Type 2 diabetes mellitus without complication, without long-term current use of insulin (H)  Comment: A1c is ordered  Plan: Hemoglobin A1c                FUTURE APPOINTMENTS:       - Follow-up visit as needed.    Giorgio Kothari is a 64 year old, presenting for the following health issues:    64 yr old female here for right shoulder pain. Patient reports the pain started a couple of weeks.  Patient reports no injury . She is not sure wether she slept on the shoulder wrong.  She reports no numbness in her hands and fingers.  Shoulder Pain (Right )        4/15/2024     2:53 PM   Additional Questions   Roomed by Jennifer Rodas   Accompanied by self         4/15/2024     2:53 PM   Patient Reported Additional Medications   Patient reports taking the following new medications Calcium with Vit D3 600mg twice daily     History of Present Illness       Reason for visit:  Soreness in shoulder    She eats 2-3 servings of fruits and vegetables daily.She consumes 0 sweetened beverage(s) daily.She exercises with enough effort to increase her heart rate 9 or less minutes per day.  She exercises with enough effort to increase her heart rate 3 or less days per week.   She is taking medications regularly.         Pain History:  When did you first notice your pain? X 4 weeks   Have you seen anyone else for your pain? No  How has your pain affected your ability to work? 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 Musculoskeletal problem/pain  Onset/Duration: x 4  "weeks  Description  Location: Shoulder - right  Joint Swelling: No  Redness: No  Pain: YES- in the joint area. Pain can sometimes radiate into biceps.  Warmth: No  Intensity:  moderate, today is 4/10.  Progression of Symptoms:  same  Accompanying signs and symptoms:   Fevers: No  Numbness/tingling/weakness: No  History  Trauma to the area: No  Recent illness:  No  Previous similar problem: No  Previous evaluation:  No  Precipitating or alleviating factors:  Aggravating factors include: lifting her arm  Therapies tried and outcome: acetaminophen          Review of Systems  CONSTITUTIONAL: NEGATIVE for fever, chills, change in weight  ENT/MOUTH: NEGATIVE for ear, mouth and throat problems  RESP: NEGATIVE for significant cough or SOB  CV: NEGATIVE for chest pain, palpitations or peripheral edema  MUSCULOSKELETAL: POSITIVE  for joint pain right shoulder  PSYCHIATRIC: NEGATIVE for changes in mood or affect      Objective    /72 (BP Location: Left arm, Patient Position: Sitting, Cuff Size: Adult Large)   Pulse 72   Temp 97.7  F (36.5  C) (Tympanic)   Resp 16   Ht 1.632 m (5' 4.25\")   Wt 81.1 kg (178 lb 11.2 oz)   SpO2 98%   BMI 30.43 kg/m    Body mass index is 30.43 kg/m .  Physical Exam   GENERAL: alert and no distress  EYES: Eyes grossly normal to inspection, PERRL and conjunctivae and sclerae normal  HENT: ear canals and TM's normal, nose and mouth without ulcers or lesions  NECK: no adenopathy, no asymmetry, masses, or scars  RESP: lungs clear to auscultation - no rales, rhonchi or wheezes  CV: regular rate and rhythm, normal S1 S2, no S3 or S4, no murmur, click or rub, no peripheral edema  ABDOMEN: soft, nontender, no hepatosplenomegaly, no masses and bowel sounds normal  MS: decreased range of motion         IMPRESSION: Small amount of calcification in the region of the  infraspinatus tendon insertion, consistent with calcific tendinosis.  Mild osteoarthrosis of the AC joint. The glenohumeral joint " appears  normal. The bones are diffusely demineralized. There is no evidence of  fracture. No subluxation or dislocation          Signed Electronically by: Conrado Millan MD

## 2024-04-29 ENCOUNTER — OFFICE VISIT (OUTPATIENT)
Dept: ORTHOPEDICS | Facility: CLINIC | Age: 64
End: 2024-04-29
Payer: COMMERCIAL

## 2024-04-29 VITALS — WEIGHT: 178 LBS | HEIGHT: 64 IN | BODY MASS INDEX: 30.39 KG/M2

## 2024-04-29 DIAGNOSIS — G89.29 CHRONIC RIGHT SHOULDER PAIN: ICD-10-CM

## 2024-04-29 DIAGNOSIS — M25.511 CHRONIC RIGHT SHOULDER PAIN: ICD-10-CM

## 2024-04-29 DIAGNOSIS — M75.31 CALCIFIC TENDINITIS OF RIGHT SHOULDER: Primary | ICD-10-CM

## 2024-04-29 PROCEDURE — 99204 OFFICE O/P NEW MOD 45 MIN: CPT | Mod: 25 | Performed by: FAMILY MEDICINE

## 2024-04-29 PROCEDURE — 20611 DRAIN/INJ JOINT/BURSA W/US: CPT | Mod: RT | Performed by: FAMILY MEDICINE

## 2024-04-29 RX ORDER — ROPIVACAINE HYDROCHLORIDE 5 MG/ML
4 INJECTION, SOLUTION EPIDURAL; INFILTRATION; PERINEURAL
Status: SHIPPED | OUTPATIENT
Start: 2024-04-29

## 2024-04-29 RX ORDER — BETAMETHASONE SODIUM PHOSPHATE AND BETAMETHASONE ACETATE 3; 3 MG/ML; MG/ML
6 INJECTION, SUSPENSION INTRA-ARTICULAR; INTRALESIONAL; INTRAMUSCULAR; SOFT TISSUE
Status: SHIPPED | OUTPATIENT
Start: 2024-04-29

## 2024-04-29 RX ADMIN — BETAMETHASONE SODIUM PHOSPHATE AND BETAMETHASONE ACETATE 6 MG: 3; 3 INJECTION, SUSPENSION INTRA-ARTICULAR; INTRALESIONAL; INTRAMUSCULAR; SOFT TISSUE at 09:12

## 2024-04-29 RX ADMIN — ROPIVACAINE HYDROCHLORIDE 4 ML: 5 INJECTION, SOLUTION EPIDURAL; INFILTRATION; PERINEURAL at 09:12

## 2024-04-29 NOTE — LETTER
4/29/2024         RE: Saniya Couch  [address] [facility] [address]        Dear Colleague,    Thank you for referring your patient, Saniya Couch, to the [facility]. Please see a copy of my visit note below.    ASSESSMENT & PLAN    Saniya was seen today for pain.    Diagnoses and all orders for this visit:    Chronic right shoulder pain  -     Orthopedic  Referral      This issue is acute on chronic and Worsening.    # Acute on Chronic Right Shoulder Pain: Saniya Couch  was seen today for right shoulder pain. Symptoms had been going on for 1 mon pain limiting ability to work at times. On examination there are positive findings of tenderness to palpation over the rotator cuff tendon, pain with strength testing. Imaging findings showed calcific tendinitis. Likely cause of patient's condition due to right shoulder calcific tendinitis.  Counseled patient on nature of condition and treatment options.  Given this plan as below, follow-up 1 mon as needed.     Image Findings: right shoulder calcific tendinitis  Treatment: Activities as tolerated, home exercises given today  Job: As tolerated  Medications/Injections: Limited tylenol/ibuprofen for pain for 1-2 weeks,Topical Voltaren gel,  right shoulder calcific tendinitis  Follow-up: In one month if symptoms do not improve, sooner if worsening  Can consider shoulder joint steroid injection, physical therapy    Davin Edmond MD  [facility]    -----  Chief Complaint   Patient presents with     Right Shoulder - Pain       SUBJECTIVE  Saniya Couch is a/an 64 year old female who is seen in consultation at the request of  Conrado Millan M.D. for evaluation of right shoulder pain. Reviewed PCP notes.    The patient is seen by themselves.  The patient is Right handed    Onset: 1 month ago. Reports insidious onset without acute precipitating event.  "Saw PCP 4/15/24 and xrays were performed.   Location of Pain: right lateral shoulder, can radiate to neck   Worsened by: overhead movements, flexion, night pain    Better with: Naproxen   Treatments tried: Naproxen,   Associated symptoms: no distal numbness or tingling; denies swelling or warmth    Orthopedic/Surgical history: NO  Social History/Occupation: cleans houses     No family history pertinent to patient's problem today.      REVIEW OF SYSTEMS:  Review of Systems  Constitutional, HEENT, cardiovascular, pulmonary, gi and gu systems are negative, except as otherwise noted.    OBJECTIVE:  Ht 1.632 m (5' 4.25\")   Wt 80.7 kg (178 lb)   BMI 30.32 kg/m     General: healthy, alert and in no distress  HEENT: no scleral icterus or conjunctival erythema  Skin: no suspicious lesions or rash. No jaundice.  CV: distal perfusion intact    Resp: normal respiratory effort without conversational dyspnea   Psych: normal mood and affect  Gait: normal steady gait with appropriate coordination and balance    Neuro: Normal light sensory exam of right upper extremity     Ortho Exam   RIGHT SHOULDER  Inspection:    no swelling, bruising, discoloration, or obvious deformity or asymmetry  Palpation:    Tender about the supraspinatus insertion. Remainder of bony and tendinous landmarks are nontender.  Active Range of Motion:     Abduction 1650, FF 1650, , IR L1.       Strength:    Scapular plane abduction 5-/5, painful,  ER 5/5, IR 5/5, biceps 5/5, triceps 5/5  Special Tests:    Positive: Neer's, Streeter', and supraspinatus (empty can)    Negative: Victoriano's       RADIOLOGY:  I independently ordered, visualized and reviewed these images with the patient    Results for orders placed or performed in visit on 04/15/24   XR Shoulder Right 2 Views    Narrative    SHOULDER RIGHT TWO VIEWS   4/15/2024 3:32 PM     HISTORY:  Right shoulder pain ongoing for about four weeks. Reduced  range of motion. Chronic right shoulder pain. "     COMPARISON: None.      Impression    IMPRESSION: Small amount of calcification in the region of the  infraspinatus tendon insertion, consistent with calcific tendinosis.  Mild osteoarthrosis of the AC joint. The glenohumeral joint appears  normal. The bones are diffusely demineralized. There is no evidence of  fracture. No subluxation or dislocation.     LALO DUNAWAY MD         SYSTEM ID:  [id number]         Review of external notes as documented elsewhere in note  Review of the result(s) of each unique test - right shoulder x-rays       Disclaimer: This note consists of symbols derived from keyboarding, dictation and/or voice recognition software. As a result, there may be errors in the script that have gone undetected. Please consider this when interpreting information found in this chart.    Large Joint Injection/Arthocentesis: R subacromial bursa    Date/Time: 4/29/2024 9:12 AM    Performed by: Davin Edmond MD  Authorized by: Davin Edmond MD    Indications:  Pain  Needle Size:  25 G  Guidance: ultrasound    Approach:  Lateral  Location:  Shoulder      Site:  R subacromial bursa  Medications:  6 mg betamethasone acet & sod phos 6 (3-3) MG/ML; 4 mL ROPivacaine 5 MG/ML  Outcome:  Tolerated well, no immediate complications  Procedure discussed: discussed risks, benefits, and alternatives    Consent Given by:  Patient  Timeout: timeout called immediately prior to procedure    Prep: patient was prepped and draped in usual sterile fashion     Ultrasound images of procedure were permanently stored.     Patient reported some improvement of pain after the numbing portion right subacromial bursa steroid injection.  Ultrasound guided images were permanently stored.   Aftercare instructions given to patient.  Plan to follow-up as discussed above.     Davin Edmond MD [facility]            Again, thank you for allowing me to participate in the care of your patient.         Sincerely,        Davin Edmond MD

## 2024-04-29 NOTE — PATIENT INSTRUCTIONS
# Acute on Chronic Right Shoulder Pain: Saniya Couch  was seen today for right shoulder pain. Symptoms had been going on for 1 mon pain limiting ability to work at times. On examination there are positive findings of tenderness to palpation over the rotator cuff tendon, pain with strength testing. Imaging findings showed calcific tendinitis. Likely cause of patient's condition due to right shoulder calcific tendinitis.  Counseled patient on nature of condition and treatment options.  Given this plan as below, follow-up 1 mon as needed.     Image Findings: right shoulder calcific tendinitis  Treatment: Activities as tolerated, home exercises given today  Job: As tolerated  Medications/Injections: Limited tylenol/ibuprofen for pain for 1-2 weeks,Topical Voltaren gel,  right shoulder calcific tendinitis  Follow-up: In one month if symptoms do not improve, sooner if worsening  Can consider shoulder joint steroid injection, physical therapy    Please call [phone]   Ask for my team if you have any questions or concerns    If you have not yet received the influenza vaccine but would like to get one, please call  [phone] or you can schedule via [employer]    It was great seeing you today!    Davin Edmond MD, [facility]     FS Injection Discharge Instructions    Procedure: right subacromial bursa steroid injection    You may shower, however avoid swimming, tub baths or hot tubs for 24 hours following your procedure  You may have a mild to moderate increase in pain for several days following the injection.  It may take up to 14 days for the steroid medication to start working although you may feel the effect as early as a few days after the procedure.  You may use ice packs for 10-15 minutes, 3 to 4 times a day at the injection site for comfort  You may use anti-inflammatory medications (such as Ibuprofen or Aleve or Advil) or Tylenol for pain control if necessary  If you were fasting, you may resume your  normal diet and medications after the procedure  If you have diabetes, check your blood sugar more frequently than usual as your blood sugar may be higher than normal for 10-14 days following a steroid injection. Contact your doctor who manages your diabetes if your blood sugar is higher than usual    If you experience any of the following, call [facility] @ [phone] or [phone]  -Fever over 100 degree F  -Swelling, bleeding, redness, drainage, warmth at the injection site  - New or worsening pain

## 2024-04-29 NOTE — PROGRESS NOTES
ASSESSMENT & PLAN    Saniya was seen today for pain.    Diagnoses and all orders for this visit:    Chronic right shoulder pain  -     Orthopedic  Referral      This issue is acute on chronic and Worsening.    # Acute on Chronic Right Shoulder Pain: Saniya Couch  was seen today for right shoulder pain. Symptoms had been going on for 1 mon pain limiting ability to work at times. On examination there are positive findings of tenderness to palpation over the rotator cuff tendon, pain with strength testing. Imaging findings showed calcific tendinitis. Likely cause of patient's condition due to right shoulder calcific tendinitis.  Counseled patient on nature of condition and treatment options.  Given this plan as below, follow-up 1 mon as needed.     Image Findings: right shoulder calcific tendinitis  Treatment: Activities as tolerated, home exercises given today  Job: As tolerated  Medications/Injections: Limited tylenol/ibuprofen for pain for 1-2 weeks,Topical Voltaren gel,  right shoulder calcific tendinitis  Follow-up: In one month if symptoms do not improve, sooner if worsening  Can consider shoulder joint steroid injection, physical therapy    Davin Edmond MD  [facility]IC[facility]    -----  Chief Complaint   Patient presents with    Right Shoulder - Pain       SUBJECTIVE  Saniya Couch is a/an 64 year old female who is seen in consultation at the request of  Conrado Millan M.D. for evaluation of right shoulder pain. Reviewed PCP notes.    The patient is seen by themselves.  The patient is Right handed    Onset: 1 month ago. Reports insidious onset without acute precipitating event. Saw PCP 4/15/24 and xrays were performed.   Location of Pain: right lateral shoulder, can radiate to neck   Worsened by: overhead movements, flexion, night pain    Better with: Naproxen   Treatments tried: Naproxen,   Associated symptoms: no distal numbness or tingling;  "denies swelling or warmth    Orthopedic/Surgical history: NO  Social History/Occupation: cleans [employer]     No family history pertinent to patient's problem today.      REVIEW OF SYSTEMS:  Review of Systems  Constitutional, HEENT, cardiovascular, pulmonary, gi and gu systems are negative, except as otherwise noted.    OBJECTIVE:  Ht 1.632 m (5' 4.25\")   Wt 80.7 kg (178 lb)   BMI 30.32 kg/m     General: healthy, alert and in no distress  HEENT: no scleral icterus or conjunctival erythema  Skin: no suspicious lesions or rash. No jaundice.  CV: distal perfusion intact    Resp: normal respiratory effort without conversational dyspnea   Psych: normal mood and affect  Gait: normal steady gait with appropriate coordination and balance    Neuro: Normal light sensory exam of right upper extremity     Ortho Exam   RIGHT SHOULDER  Inspection:    no swelling, bruising, discoloration, or obvious deformity or asymmetry  Palpation:    Tender about the supraspinatus insertion. Remainder of bony and tendinous landmarks are nontender.  Active Range of Motion:     Abduction 1650, FF 1650, , IR L1.       Strength:    Scapular plane abduction 5-/5, painful,  ER 5/5, IR 5/5, biceps 5/5, triceps 5/5  Special Tests:    Positive: Neer's, Streeter', and supraspinatus (empty can)    Negative: Victoriano's       RADIOLOGY:  I independently ordered, visualized and reviewed these images with the patient    Results for orders placed or performed in visit on 04/15/24   XR Shoulder Right 2 Views    Narrative    SHOULDER RIGHT TWO VIEWS   4/15/2024 3:32 PM     HISTORY:  Right shoulder pain ongoing for about four weeks. Reduced  range of motion. Chronic right shoulder pain.     COMPARISON: None.      Impression    IMPRESSION: Small amount of calcification in the region of the  infraspinatus tendon insertion, consistent with calcific tendinosis.  Mild osteoarthrosis of the AC joint. The glenohumeral joint appears  normal. The bones are diffusely " demineralized. There is no evidence of  fracture. No subluxation or dislocation.     LALO DUNAWAY MD         SYSTEM ID:  [id number]         Review of external notes as documented elsewhere in note  Review of the result(s) of each unique test - right shoulder x-rays       Disclaimer: This note consists of symbols derived from keyboarding, dictation and/or voice recognition software. As a result, there may be errors in the script that have gone undetected. Please consider this when interpreting information found in this chart.    Large Joint Injection/Arthocentesis: R subacromial bursa    Date/Time: 4/29/2024 9:12 AM    Performed by: Davin Edmond MD  Authorized by: Davin Edmond MD    Indications:  Pain  Needle Size:  25 G  Guidance: ultrasound    Approach:  Lateral  Location:  Shoulder      Site:  R subacromial bursa  Medications:  6 mg betamethasone acet & sod phos 6 (3-3) MG/ML; 4 mL ROPivacaine 5 MG/ML  Outcome:  Tolerated well, no immediate complications  Procedure discussed: discussed risks, benefits, and alternatives    Consent Given by:  Patient  Timeout: timeout called immediately prior to procedure    Prep: patient was prepped and draped in usual sterile fashion     Ultrasound images of procedure were permanently stored.     Patient reported some improvement of pain after the numbing portion right subacromial bursa steroid injection.  Ultrasound guided images were permanently stored.   Aftercare instructions given to patient.  Plan to follow-up as discussed above.     Davin Edmond MD [facility]

## 2024-07-06 ENCOUNTER — HEALTH MAINTENANCE LETTER (OUTPATIENT)
Age: 64
End: 2024-07-06

## 2024-11-11 ENCOUNTER — PATIENT OUTREACH (OUTPATIENT)
Dept: CARE COORDINATION | Facility: CLINIC | Age: 64
End: 2024-11-11
Payer: COMMERCIAL

## 2024-12-12 ENCOUNTER — TRANSFERRED RECORDS (OUTPATIENT)
Dept: HEALTH INFORMATION MANAGEMENT | Facility: CLINIC | Age: 64
End: 2024-12-12
Payer: COMMERCIAL

## 2024-12-16 DIAGNOSIS — E11.9 TYPE 2 DIABETES MELLITUS WITHOUT COMPLICATION, WITHOUT LONG-TERM CURRENT USE OF INSULIN (H): ICD-10-CM

## 2024-12-18 ENCOUNTER — OFFICE VISIT (OUTPATIENT)
Dept: FAMILY MEDICINE | Facility: CLINIC | Age: 64
End: 2024-12-18
Attending: FAMILY MEDICINE
Payer: COMMERCIAL

## 2024-12-18 VITALS
RESPIRATION RATE: 16 BRPM | TEMPERATURE: 98.3 F | BODY MASS INDEX: 30.65 KG/M2 | HEART RATE: 74 BPM | OXYGEN SATURATION: 97 % | WEIGHT: 173 LBS | HEIGHT: 63 IN | DIASTOLIC BLOOD PRESSURE: 68 MMHG | SYSTOLIC BLOOD PRESSURE: 130 MMHG

## 2024-12-18 DIAGNOSIS — E11.9 TYPE 2 DIABETES MELLITUS WITHOUT COMPLICATION, WITHOUT LONG-TERM CURRENT USE OF INSULIN (H): ICD-10-CM

## 2024-12-18 DIAGNOSIS — Z00.00 ENCOUNTER FOR ROUTINE ADULT HEALTH EXAMINATION WITHOUT ABNORMAL FINDINGS: Primary | ICD-10-CM

## 2024-12-18 DIAGNOSIS — E78.5 HYPERLIPIDEMIA LDL GOAL <130: ICD-10-CM

## 2024-12-18 DIAGNOSIS — I10 ESSENTIAL HYPERTENSION, BENIGN: ICD-10-CM

## 2024-12-18 LAB
ANION GAP SERPL CALCULATED.3IONS-SCNC: 9 MMOL/L (ref 7–15)
BUN SERPL-MCNC: 24.3 MG/DL (ref 8–23)
CALCIUM SERPL-MCNC: 9.8 MG/DL (ref 8.8–10.4)
CHLORIDE SERPL-SCNC: 103 MMOL/L (ref 98–107)
CHOLEST SERPL-MCNC: 171 MG/DL
CREAT SERPL-MCNC: 1.12 MG/DL (ref 0.51–0.95)
CREAT UR-MCNC: 114.3 MG/DL
EGFRCR SERPLBLD CKD-EPI 2021: 54 ML/MIN/1.73M2
EST. AVERAGE GLUCOSE BLD GHB EST-MCNC: 140 MG/DL
FASTING STATUS PATIENT QL REPORTED: YES
FASTING STATUS PATIENT QL REPORTED: YES
GLUCOSE SERPL-MCNC: 157 MG/DL (ref 70–99)
HBA1C MFR BLD: 6.5 % (ref 0–5.6)
HCO3 SERPL-SCNC: 28 MMOL/L (ref 22–29)
HDLC SERPL-MCNC: 54 MG/DL
LDLC SERPL CALC-MCNC: 90 MG/DL
MICROALBUMIN UR-MCNC: 27 MG/L
MICROALBUMIN/CREAT UR: 23.62 MG/G CR (ref 0–25)
NONHDLC SERPL-MCNC: 117 MG/DL
POTASSIUM SERPL-SCNC: 4.7 MMOL/L (ref 3.4–5.3)
SODIUM SERPL-SCNC: 140 MMOL/L (ref 135–145)
TRIGL SERPL-MCNC: 134 MG/DL

## 2024-12-18 PROCEDURE — 82043 UR ALBUMIN QUANTITATIVE: CPT | Performed by: FAMILY MEDICINE

## 2024-12-18 PROCEDURE — 80061 LIPID PANEL: CPT | Performed by: FAMILY MEDICINE

## 2024-12-18 PROCEDURE — 82570 ASSAY OF URINE CREATININE: CPT | Performed by: FAMILY MEDICINE

## 2024-12-18 PROCEDURE — 83036 HEMOGLOBIN GLYCOSYLATED A1C: CPT | Performed by: FAMILY MEDICINE

## 2024-12-18 PROCEDURE — 80048 BASIC METABOLIC PNL TOTAL CA: CPT | Performed by: FAMILY MEDICINE

## 2024-12-18 PROCEDURE — 36415 COLL VENOUS BLD VENIPUNCTURE: CPT | Performed by: FAMILY MEDICINE

## 2024-12-18 RX ORDER — GLIPIZIDE 2.5 MG/1
2.5 TABLET, EXTENDED RELEASE ORAL DAILY
Qty: 90 TABLET | Refills: 3 | Status: SHIPPED | OUTPATIENT
Start: 2024-12-18

## 2024-12-18 RX ORDER — LISINOPRIL 40 MG/1
40 TABLET ORAL DAILY
Qty: 90 TABLET | Refills: 3 | Status: SHIPPED | OUTPATIENT
Start: 2024-12-18

## 2024-12-18 SDOH — HEALTH STABILITY: PHYSICAL HEALTH: ON AVERAGE, HOW MANY DAYS PER WEEK DO YOU ENGAGE IN MODERATE TO STRENUOUS EXERCISE (LIKE A BRISK WALK)?: 0 DAYS

## 2024-12-18 ASSESSMENT — SOCIAL DETERMINANTS OF HEALTH (SDOH): HOW OFTEN DO YOU GET TOGETHER WITH FRIENDS OR RELATIVES?: PATIENT DECLINED

## 2024-12-18 ASSESSMENT — PAIN SCALES - GENERAL: PAINLEVEL_OUTOF10: MILD PAIN (2)

## 2024-12-18 NOTE — PROGRESS NOTES
Preventive Care Visit  [facility]  Conrado Millan MD, Family Medicine  Dec 18, 2024        Saniya was seen today for physical, lipids, diabetes, hypertension and imm/inj.    Diagnoses and all orders for this visit:    Encounter for routine adult health examination without abnormal findings    Type 2 diabetes mellitus without complication, without long-term current use of insulin (H)  -     Cancel: Hemoglobin A1c  -     Albumin Random Urine Quantitative with Creat Ratio  -     Hemoglobin A1c  -     glipiZIDE (GLUCOTROL XL) 2.5 MG 24 hr tablet; Take 1 tablet (2.5 mg) by mouth daily.  -     metFORMIN (GLUCOPHAGE) 500 MG tablet; Take 1 tablet (500 mg) by mouth 2 times daily (with meals).  -     OFFICE/OUTPT VISIT,EST,LEVL IV  -     A1C went up slightly from 6.4-6.5. recommend to continue with medication    Hyperlipidemia LDL goal <130  -     Lipid panel reflex to direct LDL Fasting  -     OFFICE/OUTPT VISIT,EST,LEVL IV    Essential hypertension, benign  -     Basic metabolic panel  (Ca, Cl, CO2, Creat, Gluc, K, Na, BUN)  -     lisinopril (ZESTRIL) 40 MG tablet; Take 1 tablet (40 mg) by mouth daily.  -     OFFICE/OUTPT VISIT,EST,LEVL IV  -     Blood pressure is within normal range. Continue medication.   -     Patient will be notified of results.     Other orders  -     PRIMARY CARE FOLLOW-UP SCHEDULING  -     Pneumococcal 20 Valent Conjugate (PCV20)         Subjective   Saniya is a 64 year old, presenting for the following:  Physical (Wellness physical exam.), Lipids (Recheck on lipid medication. ), Diabetes (Recheck on diabetes.), Hypertension (Recheck on blood pressure.), and Imm/Inj (She will update the Prevnar 20 today.  Declined Flu and Covid today.  Mentioned about RSV through her Pharmacy.  States she had the Shingles vaccines done.  )        12/18/2024    10:50 AM   Additional Questions   Roomed by Leora Levin CMA   Accompanied by Maynor-spouse.         12/18/2024     10:50 AM   Patient Reported Additional Medications   Patient reports taking the following new medications Tylenol as needed.       HPI    Patient is a 64 yr old female here for her physical. She has a history of Type II diabetes , Hypertension, hyperlipidemia    Patient reports that she has been doing well and has no acute complaints. She is requesting refills on all her medications    She is up to date with her mammogram, and colonoscopy. She is open to getting a pneumonia vaccine.         Chief Complaint   Patient presents with    Physical     Wellness physical exam.    Lipids     Recheck on lipid medication.     Diabetes     Recheck on diabetes.    Hypertension     Recheck on blood pressure.    Imm/Inj     She will update the Prevnar 20 today.  Declined Flu and Covid today.  Mentioned about RSV through her Pharmacy.  States she had the Shingles vaccines done.           Diabetes Follow-up    How often are you checking your blood sugar? About once per month.  Was trying to check once weekly.  What concerns do you have today about your diabetes? None   Do you have any of these symptoms? (Select all that apply)  No numbness or tingling in feet.  No redness, sores or blisters on feet.  No complaints of excessive thirst.  No reports of blurry vision.  No significant changes to weight.  Have you had a diabetic eye exam in the last 12 months? No          Hyperlipidemia Follow-Up    Are you regularly taking any medication or supplement to lower your cholesterol?   Yes- Atorvastatin.  Are you having muscle aches or other side effects that you think could be caused by your cholesterol lowering medication?  No    Hypertension Follow-up    Do you check your blood pressure regularly outside of the clinic? No   Are you following a low salt diet? Yes-tries to.  Are your blood pressures ever more than 140 on the top number (systolic) OR more   than 90 on the bottom number (diastolic), for example 140/90? Not checking.     BP  Readings from Last 2 Encounters:   12/18/24 130/68   04/15/24 128/72     Hemoglobin A1C (%)   Date Value   12/18/2024 6.5 (H)   12/11/2023 6.4 (H)   11/02/2020 9.5 (H)   10/21/2019 6.7 (H)     LDL Cholesterol Calculated (mg/dL)   Date Value   12/18/2024 90   12/11/2023 107 (H)   11/02/2020 102 (H)   10/21/2019 92       Health Care Directive  Patient does not have a Health Care Directive: Discussed advance care planning with patient; information given to patient to review.      12/18/2024   General Health   How would you rate your overall physical health? Good   Feel stress (tense, anxious, or unable to sleep) Only a little      (!) STRESS CONCERN      12/18/2024   Nutrition   Diet: Regular (no restrictions)    Low salt       Multiple values from one day are sorted in reverse-chronological order         12/18/2024   Exercise   Days per week of moderate/strenous exercise 0 days      (!) EXERCISE CONCERN      12/18/2024   Social Factors   Frequency of gathering with friends or relatives Patient declined   Worry food won't last until get money to buy more No   Food not last or not have enough money for food? No   Do you have housing? (Housing is defined as stable permanent housing and does not include staying ouside in a car, in a tent, in an abandoned building, in an overnight shelter, or couch-surfing.) Yes   Are you worried about losing your housing? No   Lack of transportation? No   Unable to get utilities (heat,electricity)? No            12/18/2024   Fall Risk   Fallen 2 or more times in the past year? No     No    Trouble with walking or balance? No     No        Patient-reported    Multiple values from one day are sorted in reverse-chronological order          12/18/2024   Activities of Daily Living- Home Safety   Needs help with the following daily activites None of the above   Safety concerns in the home None of the above            12/18/2024   Dental   Dentist two times every year? Yes            12/18/2024    Hearing Screening   Hearing concerns? None of the above            12/18/2024   Driving Risk Screening   Patient/family members have concerns about driving No            12/18/2024   General Alertness/Fatigue Screening   Have you been more tired than usual lately? No            12/18/2024   Urinary Incontinence Screening   Bothered by leaking urine in past 6 months No            12/18/2024   TB Screening   Were you born outside of the [place]? No            Today's PHQ-2 Score:       12/18/2024    10:36 AM   PHQ-2 ( 1999 Pfizer)   Q1: Little interest or pleasure in doing things 0    Q2: Feeling down, depressed or hopeless 0    PHQ-2 Score 0    Q1: Little interest or pleasure in doing things Not at all   Q2: Feeling down, depressed or hopeless Not at all   PHQ-2 Score 0       Patient-reported           12/18/2024   Substance Use   Alcohol more than 3/day or more than 7/wk No   Do you have a current opioid prescription? No   How severe/bad is pain from 1 to 10? 2/10   Do you use any other substances recreationally? No        Social History     Tobacco Use    Smoking status: Never    Smokeless tobacco: Never   Vaping Use    Vaping status: Never Used   Substance Use Topics    Alcohol use: No    Drug use: No           2/14/2024   LAST FHS-7 RESULTS   1st degree relative breast or ovarian cancer No   Any relative bilateral breast cancer No   Any male have breast cancer No   Any ONE woman have BOTH breast AND ovarian cancer No   Any woman with breast cancer before 50yrs No   2 or more relatives with breast AND/OR ovarian cancer No   2 or more relatives with breast AND/OR bowel cancer No        Mammogram Screening - Mammogram every 1-2 years updated in Health Maintenance based on mutual decision making      History of abnormal Pap smear: No - age 65 or older with adequate negative prior screening test results (3 consecutive negative cytology results, 2 consecutive negative cotesting results, or 2 consecutive negative HrHPV  test results within 10 years, with the most recent test occurring within the recommended screening interval for the test used)        Latest Ref Rng & Units 10/16/2018     7:50 AM 10/16/2018     7:35 AM 2016    12:00 AM   PAP / HPV   PAP (Historical)  NIL   NIL    HPV 16 DNA NEG^Negative  Negative     HPV 18 DNA NEG^Negative  Negative     Other HR HPV NEG^Negative  Negative       ASCVD Risk   The 10-year ASCVD risk score (Phil LEDESMA, et al., 2019) is: 14.9%    Values used to calculate the score:      Age: 64 years      Sex: Female      Is Non- : No      Diabetic: Yes      Tobacco smoker: No      Systolic Blood Pressure: 130 mmHg      Is BP treated: Yes      HDL Cholesterol: 54 mg/dL      Total Cholesterol: 171 mg/dL          Reviewed and updated as needed this visit by Provider     Meds  Problems               Past Medical History:   Diagnosis Date    Acid reflux     ASCUS favor benign 2007    negative HPV    Helicobacter pylori     Hyperglycemia     Hyperlipidemia     Obesity, unspecified     Ulcer 2005    h-pylori     Past Surgical History:   Procedure Laterality Date     SECTION  1999    GALLBLADDER SURGERY  2005     Lab work is in process  Labs reviewed in EPIC  BP Readings from Last 3 Encounters:   24 130/68   04/15/24 128/72   23 136/64    Wt Readings from Last 3 Encounters:   24 78.5 kg (173 lb)   24 80.7 kg (178 lb)   04/15/24 81.1 kg (178 lb 11.2 oz)                  Patient Active Problem List   Diagnosis    GERD (gastroesophageal reflux disease)    Hyperlipidemia LDL goal <130    Family history of diabetes mellitus    Essential hypertension, benign    Non morbid obesity due to excess calories    Type 2 diabetes mellitus without complication (H)    Special screening for malignant neoplasms, colon     Past Surgical History:   Procedure Laterality Date     SECTION  1999    GALLBLADDER SURGERY   01/01/2005       Social History     Tobacco Use    Smoking status: Never    Smokeless tobacco: Never   Substance Use Topics    Alcohol use: No     Family History   Problem Relation Age of Onset    Hypertension Mother     Dementia Mother     Heart Disease Father         heart disease    Thyroid Disease Daughter         thyroid disease    Diabetes Brother     Diabetes Brother     Diabetes Brother     Diabetes Brother          Current Outpatient Medications   Medication Sig Dispense Refill    atorvastatin (LIPITOR) 40 MG tablet Take 1 tablet (40 mg) by mouth daily 90 tablet 3    blood glucose (NO BRAND SPECIFIED) test strip test blood sugar 3 times daily or as directed. 100 strip 3    blood glucose monitoring (ACCU-CHEK FASTCLIX) lancets 1 each daily Use to test blood sugar 1 times daily or as directed. 102 each 11    blood glucose monitoring (NO BRAND SPECIFIED) test strip Use to test blood sugars 1 times daily or as directed. One Touch Ultra blue, one touch verio. 100 strip 4    glipiZIDE (GLUCOTROL XL) 2.5 MG 24 hr tablet Take 1 tablet (2.5 mg) by mouth daily. 90 tablet 3    lisinopril (ZESTRIL) 40 MG tablet Take 1 tablet (40 mg) by mouth daily. 90 tablet 3    metFORMIN (GLUCOPHAGE) 500 MG tablet Take 1 tablet (500 mg) by mouth 2 times daily (with meals). 180 tablet 3    naproxen (NAPROSYN) 500 MG tablet Take 1 tablet (500 mg) by mouth 2 times daily (with meals) (Patient not taking: Reported on 12/18/2024) 20 tablet 0     No Known Allergies  Current providers sharing in care for this patient include:  Patient Care Team:  Conrado Millan MD as PCP - General (Family Medicine)  Conrado Millan MD as Assigned PCP  Davin Edmond MD as Assigned Musculoskeletal Provider    The following health maintenance items are reviewed in Epic and correct as of today:  Health Maintenance   Topic Date Due    RSV VACCINE (1 - Risk 60-74 years 1-dose series) Never done    EYE EXAM  01/01/2021    ZOSTER  "IMMUNIZATION (2 of 2) 03/18/2021    DIABETIC FOOT EXAM  11/16/2022    INFLUENZA VACCINE (1) 09/01/2024    COVID-19 Vaccine (1 - 2024-25 season) Never done    MICROALBUMIN  12/11/2024    ANNUAL REVIEW OF HM ORDERS  12/11/2024    COLORECTAL CANCER SCREENING  12/12/2024    MEDICARE ANNUAL WELLNESS VISIT  12/11/2024    A1C  06/18/2025    BMP  12/18/2025    LIPID  12/18/2025    FALL RISK ASSESSMENT  12/18/2025    MAMMO SCREENING  02/14/2026    ADVANCE CARE PLANNING  12/11/2028    DTAP/TDAP/TD IMMUNIZATION (3 - Td or Tdap) 11/16/2032    DEXA  02/14/2039    HEPATITIS C SCREENING  Completed    PHQ-2 (once per calendar year)  Completed    Pneumococcal Vaccine: 65+ Years  Completed    HPV IMMUNIZATION  Aged Out    MENINGITIS IMMUNIZATION  Aged Out    RSV MONOCLONAL ANTIBODY  Aged Out    PAP  Discontinued         Review of Systems  CONSTITUTIONAL: NEGATIVE for fever, chills, change in weight  INTEGUMENTARY/SKIN: NEGATIVE for worrisome rashes, moles or lesions  EYES: NEGATIVE for vision changes or irritation  ENT/MOUTH: NEGATIVE for ear, mouth and throat problems  RESP: NEGATIVE for significant cough or SOB  CV: NEGATIVE for chest pain, palpitations or peripheral edema  GI: NEGATIVE for nausea, abdominal pain, heartburn, or change in bowel habits  : NEGATIVE for frequency, dysuria, or hematuria  MUSCULOSKELETAL: NEGATIVE for significant arthralgias or myalgia  NEURO: NEGATIVE for weakness, dizziness or paresthesias  ENDOCRINE: NEGATIVE for temperature intolerance, skin/hair changes  HEME: NEGATIVE for bleeding problems  PSYCHIATRIC: NEGATIVE for changes in mood or affect     Objective    Exam  /68 (BP Location: Left arm, Patient Position: Chair, Cuff Size: Adult Regular)   Pulse 74   Temp 98.3  F (36.8  C) (Tympanic)   Resp 16   Ht 1.6 m (5' 3\")   Wt 78.5 kg (173 lb)   SpO2 97%   BMI 30.65 kg/m     Estimated body mass index is 30.65 kg/m  as calculated from the following:    Height as of this encounter: 1.6 m (5' " "3\").    Weight as of this encounter: 78.5 kg (173 lb).    Physical Exam  GENERAL: alert and no distress  EYES: Eyes grossly normal to inspection, PERRL and conjunctivae and sclerae normal  HENT: ear canals and TM's normal, nose and mouth without ulcers or lesions  NECK: no adenopathy, no asymmetry, masses, or scars  RESP: lungs clear to auscultation - no rales, rhonchi or wheezes  CV: regular rate and rhythm, normal S1 S2, no S3 or S4, no murmur, click or rub, no peripheral edema  ABDOMEN: soft, nontender, no hepatosplenomegaly, no masses and bowel sounds normal  MS: no gross musculoskeletal defects noted, no edema  SKIN: no suspicious lesions or rashes  NEURO: Normal strength and tone, mentation intact and speech normal  PSYCH: mentation appears normal, affect normal/bright  Diabetic foot exam: normal DP and PT pulses, no trophic changes or ulcerative lesions, and normal sensory exam         12/18/2024   Mini Cog   Clock Draw Score 2 Normal   3 Item Recall 3 objects recalled   Mini Cog Total Score 5               Signed Electronically by: Conrado Millan MD    "

## 2024-12-18 NOTE — NURSING NOTE
Prior to immunization administration, verified patients identity using patient s name and date of birth. Please see Immunization Activity for additional information.     Screening Questionnaire for Adult Immunization    Are you sick today?   No   Do you have allergies to medications, food, a vaccine component or latex?   No   Have you ever had a serious reaction after receiving a vaccination?   No   Do you have a long-term health problem with heart, lung, kidney, or metabolic disease (e.g., diabetes), asthma, a blood disorder, no spleen, complement component deficiency, a cochlear implant, or a spinal fluid leak?  Are you on long-term aspirin therapy?   Yes-diabetes.    Do you have cancer, leukemia, HIV/AIDS, or any other immune system problem?   No   Do you have a parent, brother, or sister with an immune system problem?   No   In the past 3 months, have you taken medications that affect  your immune system, such as prednisone, other steroids, or anticancer drugs; drugs for the treatment of rheumatoid arthritis, Crohn s disease, or psoriasis; or have you had radiation treatments?   No   Have you had a seizure, or a brain or other nervous system problem?   No   During the past year, have you received a transfusion of blood or blood    products, or been given immune (gamma) globulin or antiviral drug?   No   For women: Are you pregnant or is there a chance you could become       pregnant during the next month?   No   Have you received any vaccinations in the past 4 weeks?   No     Immunization questionnaire was positive for at least one answer.  After reading the back of the injection sheet this injection is ok to give.      Patient instructed to remain in clinic for 15 minutes afterwards, and to report any adverse reactions.     Screening performed by Leora Levin CMA on 12/18/2024 at 11:14 AM.

## 2024-12-18 NOTE — LETTER
December 19, 2024      Saniya Couch  [address] [facility] [address]        Dear ,    We are writing to inform you of your test results.    Your urine test came back with no abnormalities. You do have some kidney disease , your creatinine ( lab that tell us about kidney function ) was slightly high. Recommend hydrating.  Thanks  Dr Millan    Resulted Orders   Albumin Random Urine Quantitative with Creat Ratio   Result Value Ref Range    Creatinine Urine mg/dL 114.3 mg/dL      Comment:      The reference ranges have not been established in urine creatinine. The results should be integrated into the clinical context for interpretation.    Albumin Urine mg/L 27.0 mg/L      Comment:      The reference ranges have not been established in urine albumin. The results should be integrated into the clinical context for interpretation.    Albumin Urine mg/g Cr 23.62 0.00 - 25.00 mg/g Cr      Comment:      Microalbuminuria is defined as an albumin:creatinine ratio of 17 to 299 for males and 25 to 299 for females. A ratio of albumin:creatinine of 300 or higher is indicative of overt proteinuria.  Due to biologic variability, positive results should be confirmed by a second, first-morning random or 24-hour timed urine specimen. If there is discrepancy, a third specimen is recommended. When 2 out of 3 results are in the microalbuminuria range, this is evidence for incipient nephropathy and warrants increased efforts at glucose control, blood pressure control, and institution of therapy with an angiotensin-converting-enzyme (ACE) inhibitor (if the patient can tolerate it).     Lipid panel reflex to direct LDL Fasting   Result Value Ref Range    Cholesterol 171 <200 mg/dL    Triglycerides 134 <150 mg/dL    Direct Measure HDL 54 >=50 mg/dL    LDL Cholesterol Calculated 90 <100 mg/dL    Non HDL Cholesterol 117 <130 mg/dL    Patient Fasting > 8hrs? Yes     Narrative    Cholesterol  Desirable: < 200  mg/dL  Borderline High: 200 - 239 mg/dL  High: >= 240 mg/dL    Triglycerides  Normal: < 150 mg/dL  Borderline High: 150 - 199 mg/dL  High: 200-499 mg/dL  Very High: >= 500 mg/dL    Direct Measure HDL  Female: >= 50 mg/dL   Male: >= 40 mg/dL    LDL Cholesterol  Desirable: < 100 mg/dL  Above Desirable: 100 - 129 mg/dL   Borderline High: 130 - 159 mg/dL   High:  160 - 189 mg/dL   Very High: >= 190 mg/dL    Non HDL Cholesterol  Desirable: < 130 mg/dL  Above Desirable: 130 - 159 mg/dL  Borderline High: 160 - 189 mg/dL  High: 190 - 219 mg/dL  Very High: >= 220 mg/dL   Basic metabolic panel  (Ca, Cl, CO2, Creat, Gluc, K, Na, BUN)   Result Value Ref Range    Sodium 140 135 - 145 mmol/L    Potassium 4.7 3.4 - 5.3 mmol/L    Chloride 103 98 - 107 mmol/L    Carbon Dioxide (CO2) 28 22 - 29 mmol/L    Anion Gap 9 7 - 15 mmol/L    Urea Nitrogen 24.3 (H) 8.0 - 23.0 mg/dL    Creatinine 1.12 (H) 0.51 - 0.95 mg/dL    GFR Estimate 54 (L) >60 mL/min/1.73m2      Comment:      eGFR calculated using 2021 CKD-EPI equation.    Calcium 9.8 8.8 - 10.4 mg/dL      Comment:      Reference intervals for this test were updated on 7/16/2024 to reflect our healthy population more accurately. There may be differences in the flagging of prior results with similar values performed with this method. Those prior results can be interpreted in the context of the updated reference intervals.    Glucose 157 (H) 70 - 99 mg/dL    Patient Fasting > 8hrs? Yes    Hemoglobin A1c   Result Value Ref Range    Estimated Average Glucose 140 (H) <117 mg/dL    Hemoglobin A1C 6.5 (H) 0.0 - 5.6 %      Comment:      Normal <5.7%   Prediabetes 5.7-6.4%    Diabetes 6.5% or higher     Note: Adopted from ADA consensus guidelines.       If you have any questions or concerns, please call the clinic at the number listed above.       Sincerely,      Conrado Millan MD

## 2025-02-03 DIAGNOSIS — E78.5 HYPERLIPIDEMIA LDL GOAL <100: ICD-10-CM

## 2025-02-03 RX ORDER — ATORVASTATIN CALCIUM 40 MG/1
40 TABLET, FILM COATED ORAL DAILY
Qty: 90 TABLET | Refills: 2 | Status: SHIPPED | OUTPATIENT
Start: 2025-02-03

## 2025-02-03 NOTE — TELEPHONE ENCOUNTER
LDL Cholesterol Calculated   Date Value Ref Range Status   12/18/2024 90 <100 mg/dL Final   11/02/2020 102 (H) <100 mg/dL Final     Comment:     Above desirable:  100-129 mg/dl  Borderline High:  130-159 mg/dL  High:             160-189 mg/dL  Very high:       >189 mg/dl

## 2025-02-03 NOTE — TELEPHONE ENCOUNTER
Medication Question or Refill    Contacts       Contact Date/Time Type Contact Phone/Fax    02/03/2025 11:06 AM CST Interface (Incoming) [facility] #[id number] - [address]  AT [facility] (Pharmacy) [phone]            What medication are you calling about (include dose and sig)?: Pending Prescriptions:                       Disp   Refills    atorvastatin (LIPITOR) 40 MG tablet [Phar*90 tab*3            Sig: TAKE 1 TABLET(40 MG) BY MOUTH DAILY        Preferred Pharmacy:     [facility] #[id number] [address]  AT [address] [place] [address]  Phone: [phone] Fax: [phone]      Controlled Substance Agreement on file:   CSA -- Patient Level:    CSA: None found at the patient level.       Who prescribed the medication?: Akintola    Do you need a refill? Yes    When did you use the medication last? Pt states she will take her last pill 2/3      Could we send this information to you in [facility] or would you prefer to receive a phone call?:   Patient would prefer a phone call   Okay to leave a detailed message?: Yes at Home number on file [phone] (home)

## 2025-02-19 DIAGNOSIS — I10 ESSENTIAL HYPERTENSION, BENIGN: ICD-10-CM

## 2025-02-19 DIAGNOSIS — E11.9 TYPE 2 DIABETES MELLITUS WITHOUT COMPLICATION, WITHOUT LONG-TERM CURRENT USE OF INSULIN (H): ICD-10-CM

## 2025-02-19 RX ORDER — LISINOPRIL 40 MG/1
40 TABLET ORAL DAILY
Qty: 90 TABLET | Refills: 3 | OUTPATIENT
Start: 2025-02-19

## 2025-02-19 RX ORDER — GLIPIZIDE 2.5 MG/1
2.5 TABLET, EXTENDED RELEASE ORAL DAILY
Qty: 90 TABLET | Refills: 3 | OUTPATIENT
Start: 2025-02-19

## 2025-02-19 NOTE — TELEPHONE ENCOUNTER
Pending Prescriptions:                       Disp   Refills    lisinopril (ZESTRIL) 40 MG tablet         90 tab*3            Sig: Take 1 tablet (40 mg) by mouth daily.    glipiZIDE (GLUCOTROL XL) 2.5 MG 24 hr tab*90 tab*3            Sig: Take 1 tablet (2.5 mg) by mouth daily.    Fely Quinn on 2/19/2025 at 1:00 PM

## 2025-02-20 RX ORDER — LISINOPRIL 40 MG/1
40 TABLET ORAL DAILY
Qty: 90 TABLET | Refills: 3 | OUTPATIENT
Start: 2025-02-20

## 2025-04-23 ENCOUNTER — HOSPITAL ENCOUNTER (OUTPATIENT)
Dept: MAMMOGRAPHY | Facility: CLINIC | Age: 65
Discharge: HOME | End: 2025-04-23
Attending: FAMILY MEDICINE
Payer: COMMERCIAL

## 2025-04-23 DIAGNOSIS — Z12.31 VISIT FOR SCREENING MAMMOGRAM: ICD-10-CM

## 2025-04-23 PROCEDURE — 77063 BREAST TOMOSYNTHESIS BI: CPT

## 2025-06-22 ENCOUNTER — HEALTH MAINTENANCE LETTER (OUTPATIENT)
Age: 65
End: 2025-06-22

## 2025-08-03 DIAGNOSIS — E11.9 TYPE 2 DIABETES MELLITUS WITHOUT COMPLICATION, WITHOUT LONG-TERM CURRENT USE OF INSULIN (H): ICD-10-CM

## 2025-08-04 RX ORDER — GLIPIZIDE 2.5 MG/1
2.5 TABLET, EXTENDED RELEASE ORAL DAILY
Qty: 90 TABLET | Refills: 3 | OUTPATIENT
Start: 2025-08-04